# Patient Record
Sex: MALE | Race: WHITE | Employment: OTHER | URBAN - METROPOLITAN AREA
[De-identification: names, ages, dates, MRNs, and addresses within clinical notes are randomized per-mention and may not be internally consistent; named-entity substitution may affect disease eponyms.]

---

## 2017-01-16 ENCOUNTER — OFFICE VISIT (OUTPATIENT)
Dept: INTERNAL MEDICINE CLINIC | Facility: CLINIC | Age: 82
End: 2017-01-16

## 2017-01-16 VITALS
HEIGHT: 68 IN | SYSTOLIC BLOOD PRESSURE: 136 MMHG | HEART RATE: 64 BPM | RESPIRATION RATE: 16 BRPM | WEIGHT: 208 LBS | BODY MASS INDEX: 31.52 KG/M2 | DIASTOLIC BLOOD PRESSURE: 80 MMHG

## 2017-01-16 DIAGNOSIS — F17.200 TOBACCO USE DISORDER: ICD-10-CM

## 2017-01-16 DIAGNOSIS — I10 ESSENTIAL HYPERTENSION WITH GOAL BLOOD PRESSURE LESS THAN 130/85: ICD-10-CM

## 2017-01-16 DIAGNOSIS — Z23 NEED FOR VACCINATION: ICD-10-CM

## 2017-01-16 DIAGNOSIS — E78.2 MIXED HYPERLIPIDEMIA: ICD-10-CM

## 2017-01-16 DIAGNOSIS — E11.9 TYPE 2 DIABETES MELLITUS WITHOUT COMPLICATION, WITHOUT LONG-TERM CURRENT USE OF INSULIN (HCC): Primary | ICD-10-CM

## 2017-01-16 PROCEDURE — G0463 HOSPITAL OUTPT CLINIC VISIT: HCPCS | Performed by: INTERNAL MEDICINE

## 2017-01-16 PROCEDURE — 99214 OFFICE O/P EST MOD 30 MIN: CPT | Performed by: INTERNAL MEDICINE

## 2017-01-16 NOTE — PROGRESS NOTES
HPI:   1. Type 2 diabetes mellitus without complication, without long-term current use of insulin (HCC)    Ghada Mims is a [de-identified]year old male who presents for recheck of his diabetes. Patient’s FBS have been good.  Last visit with ophthalmologist was 2 05/08/2015 21   ----------  ALT (SGPT) (P) (U/L)   Date Value   05/13/2016 29   12/23/2015 19   05/08/2015 27   ----------   No results found for: [facility]      Current Outpatient Prescriptions:  simvastatin 20 MG Oral Tab Take 1 tablet (20 mg total) tenderness  EXTREMITIES: no cyanosis, clubbing or edema   NEURO: sensation is intact to monofilament     ASSESSMENT AND PLAN:   1.  Type 2 diabetes mellitus without complication, without long-term current use of insulin ([facility]    Lizeth Pereyra is a 70 ye

## 2017-02-13 RX ORDER — LISINOPRIL 10 MG/1
10 TABLET ORAL
Qty: 90 TABLET | Refills: 0 | Status: SHIPPED | OUTPATIENT
Start: 2017-02-13 | End: 2017-05-17

## 2017-02-13 RX ORDER — SIMVASTATIN 20 MG
20 TABLET ORAL NIGHTLY
Qty: 90 TABLET | Refills: 0 | Status: SHIPPED | OUTPATIENT
Start: 2017-02-13 | End: 2017-05-17

## 2017-02-13 NOTE — TELEPHONE ENCOUNTER
Cholesterol Medications - SIMVASTATIN  Protocol Criteria:  · Appointment scheduled in the past 12 months or in the next 3 months  · ALT & LDL on file in the past 12 months  · ALT result < 80  · LDL result <130   Recent Visits       Provider Department Prim

## 2017-02-13 NOTE — TELEPHONE ENCOUNTER
Hypertensive Medications - LISINOPRIL  Protocol Criteria:  · Appointment scheduled in the past 6 months or in the next 3 months  · BMP or CMP in the past 12 months  · Creatinine result < 2  Recent Visits       Provider Department Primary Dx    4 weeks ago

## 2017-02-13 NOTE — TELEPHONE ENCOUNTER
Diabetes Medications - METFORMIN  Protocol Criteria:  · Appointment scheduled in the past 6 months or the next 3 months  · A1C < 7.5 in the past 6 months  · Creatinine in the past 12 months  · Creatinine result < 1.5   Recent Visits       Provider Departme

## 2017-04-17 ENCOUNTER — OFFICE VISIT (OUTPATIENT)
Dept: INTERNAL MEDICINE CLINIC | Facility: CLINIC | Age: 82
End: 2017-04-17

## 2017-04-17 VITALS
BODY MASS INDEX: 31.07 KG/M2 | HEART RATE: 71 BPM | HEIGHT: 68 IN | SYSTOLIC BLOOD PRESSURE: 134 MMHG | DIASTOLIC BLOOD PRESSURE: 79 MMHG | WEIGHT: 205 LBS | RESPIRATION RATE: 16 BRPM

## 2017-04-17 DIAGNOSIS — I10 ESSENTIAL HYPERTENSION WITH GOAL BLOOD PRESSURE LESS THAN 130/85: ICD-10-CM

## 2017-04-17 DIAGNOSIS — E11.9 TYPE 2 DIABETES MELLITUS WITHOUT COMPLICATION, WITHOUT LONG-TERM CURRENT USE OF INSULIN (HCC): Primary | ICD-10-CM

## 2017-04-17 DIAGNOSIS — F17.200 TOBACCO USE DISORDER: ICD-10-CM

## 2017-04-17 DIAGNOSIS — E78.2 MIXED HYPERLIPIDEMIA: ICD-10-CM

## 2017-04-17 PROCEDURE — 99214 OFFICE O/P EST MOD 30 MIN: CPT | Performed by: INTERNAL MEDICINE

## 2017-04-17 PROCEDURE — G0463 HOSPITAL OUTPT CLINIC VISIT: HCPCS | Performed by: INTERNAL MEDICINE

## 2017-04-17 NOTE — PROGRESS NOTES
HPI:   1. Type 2 diabetes mellitus without complication, without long-term current use of insulin (HCC)    Kesha Steele is a [de-identified]year old male who presents for recheck of his diabetes. Patient’s FBS have been good.  Last visit with ophthalmologist was 2 05/08/2015 21   ----------  ALT (U/L)   Date Value   05/13/2016 29   12/23/2015 19   05/08/2015 27   ----------   No results found for: [facility]      Current Outpatient Prescriptions:  simvastatin 20 MG Oral Tab Take 1 tablet (20 mg total) by mouth n tenderness  EXTREMITIES: no cyanosis, clubbing or edema   NEURO: sensation is intact to monofilament   DIABETIC FOOT EXAM: Good pulse bilaterally Feet warm. Pin prick and light touch intact. No obvious deformities seen. ASSESSMENT AND PLAN:   1.  Type 2 issues and agrees to the plan.     The patient is asked to return in 3-4 months

## 2017-05-16 NOTE — TELEPHONE ENCOUNTER
Daughter states patient has been out of meds for 6 days. Would like for rx to be sent to [email or web address].

## 2017-05-17 ENCOUNTER — TELEPHONE (OUTPATIENT)
Dept: FAMILY MEDICINE CLINIC | Facility: CLINIC | Age: 82
End: 2017-05-17

## 2017-05-17 RX ORDER — LISINOPRIL 10 MG/1
10 TABLET ORAL
Qty: 30 TABLET | Refills: 0 | Status: SHIPPED | OUTPATIENT
Start: 2017-05-17 | End: 2017-06-19

## 2017-05-17 RX ORDER — LISINOPRIL 10 MG/1
10 TABLET ORAL
Qty: 90 TABLET | Refills: 0 | Status: CANCELLED | OUTPATIENT
Start: 2017-05-17

## 2017-05-17 RX ORDER — SIMVASTATIN 20 MG
20 TABLET ORAL NIGHTLY
Qty: 90 TABLET | Refills: 0 | Status: CANCELLED | OUTPATIENT
Start: 2017-05-17

## 2017-05-17 RX ORDER — SIMVASTATIN 20 MG
20 TABLET ORAL NIGHTLY
Qty: 30 TABLET | Refills: 0 | Status: SHIPPED | OUTPATIENT
Start: 2017-05-17 | End: 2017-06-19

## 2017-05-17 NOTE — TELEPHONE ENCOUNTER
Diabetes Medications  Protocol Criteria:  · Appointment scheduled in the past 6 months or the next 3 months  · A1C < 7.5 in the past 6 months  · Creatinine in the past 12 months  · Creatinine result < 1.5   Recent Visits       Provider Department Primary D Rubin F/U          Lab Results  Component Value Date   LDL 68 05/13/2016       Lab Results  Component Value Date   ALT 29 05/13/2016         Hypertensive Medications  Protocol Criteria:  · Appointment scheduled in the past 6 months or in the next 3 deng

## 2017-05-17 NOTE — TELEPHONE ENCOUNTER
To on call for Dr. Vicenta Chambers to send 30d supply for Pt? He has been out of meds for 6 days. Please advise. See refill encounter 5/12/17. Rx failed per protocol due to overdue labs. Pt's daughter informed to have labs drawn asap.

## 2017-05-17 NOTE — TELEPHONE ENCOUNTER
Pt's daughter called back. She was informed of Carmen's message below.  She verbalized understanding

## 2017-05-17 NOTE — TELEPHONE ENCOUNTER
LMTCB. Pt needs to have labs drawn. It has been over 1 year. Labs ordered in the system. Dr. DOLAN, please advise on request. Protocol failed due to overdue labs.  Thanks

## 2017-05-17 NOTE — TELEPHONE ENCOUNTER
Pt contacted (Name and  verified) and pt informed that his Rxs for Simvastatin, Metformin and Lisinopril were sent to the pharmacy on file and should be ready for  today or tomorrow.     Pt verbalizes understanding, expresses intent to comply and

## 2017-05-18 ENCOUNTER — LAB ENCOUNTER (OUTPATIENT)
Dept: LAB | Age: 82
End: 2017-05-18
Attending: INTERNAL MEDICINE
Payer: MEDICARE

## 2017-05-18 DIAGNOSIS — I10 ESSENTIAL HYPERTENSION WITH GOAL BLOOD PRESSURE LESS THAN 130/85: ICD-10-CM

## 2017-05-18 DIAGNOSIS — E78.2 MIXED HYPERLIPIDEMIA: ICD-10-CM

## 2017-05-18 DIAGNOSIS — E11.9 TYPE 2 DIABETES MELLITUS WITHOUT COMPLICATION, WITHOUT LONG-TERM CURRENT USE OF INSULIN (HCC): ICD-10-CM

## 2017-05-18 PROCEDURE — 81001 URINALYSIS AUTO W/SCOPE: CPT

## 2017-05-18 PROCEDURE — 36415 COLL VENOUS BLD VENIPUNCTURE: CPT

## 2017-05-18 PROCEDURE — 82570 ASSAY OF URINE CREATININE: CPT

## 2017-05-18 PROCEDURE — 84443 ASSAY THYROID STIM HORMONE: CPT

## 2017-05-18 PROCEDURE — 85025 COMPLETE CBC W/AUTO DIFF WBC: CPT

## 2017-05-18 PROCEDURE — 80053 COMPREHEN METABOLIC PANEL: CPT

## 2017-05-18 PROCEDURE — 82043 UR ALBUMIN QUANTITATIVE: CPT

## 2017-05-18 PROCEDURE — 83036 HEMOGLOBIN GLYCOSYLATED A1C: CPT

## 2017-05-18 PROCEDURE — 80061 LIPID PANEL: CPT

## 2017-05-30 ENCOUNTER — TELEPHONE (OUTPATIENT)
Dept: INTERNAL MEDICINE CLINIC | Facility: CLINIC | Age: 82
End: 2017-05-30

## 2017-05-30 NOTE — TELEPHONE ENCOUNTER
LMTCB. Please transfer to  [id number] anytime. Just want to verify with pt ok to dicuss medications with daughter as no HIPAA on file, and it appears last lab results and medication increase in metformin was given to pt (per 5/18 lab result note).

## 2017-05-30 NOTE — TELEPHONE ENCOUNTER
Patient daughter Emanuel Zuñiga called and stated has questions regarding medication father is on  Requesting a call back from nurse

## 2017-05-30 NOTE — TELEPHONE ENCOUNTER
Spoke to Tiara, with permission with patient. Explained why his metformin was increased; explained labs. She will make it appt for patient to fill out [employer].

## 2017-06-06 ENCOUNTER — TELEPHONE (OUTPATIENT)
Dept: INTERNAL MEDICINE CLINIC | Facility: CLINIC | Age: 82
End: 2017-06-06

## 2017-06-06 NOTE — TELEPHONE ENCOUNTER
Call transferred by CSS: Pt daughter calling back because thought RN she spoke with (see 5/25/17 encounter) told her she was going to send new metformin Rx (with increased dose) to [facility] but it appears it wasn't sent.  Informed daughter there was no need to s

## 2017-06-06 NOTE — TELEPHONE ENCOUNTER
Per daughter of pt,  RN suppose to send pt new rx med to his pharmacy on file but when pt went to pharmacy nothing was sent.

## 2017-06-20 RX ORDER — LISINOPRIL 10 MG/1
TABLET ORAL
Qty: 30 TABLET | Refills: 0 | Status: SHIPPED | OUTPATIENT
Start: 2017-06-20 | End: 2017-07-19

## 2017-06-20 RX ORDER — SIMVASTATIN 20 MG
TABLET ORAL
Qty: 30 TABLET | Refills: 0 | Status: SHIPPED | OUTPATIENT
Start: 2017-06-20 | End: 2017-07-19

## 2017-06-20 NOTE — TELEPHONE ENCOUNTER
Refilled per protocol    Cholesterol Medications  Protocol Criteria:  · Appointment scheduled in the past 12 months or in the next 3 months  · ALT & LDL on file in the past 12 months  · ALT result < 80  · LDL result <130     Future Appointments       Provi

## 2017-07-19 ENCOUNTER — OFFICE VISIT (OUTPATIENT)
Dept: INTERNAL MEDICINE CLINIC | Facility: CLINIC | Age: 82
End: 2017-07-19

## 2017-07-19 VITALS
HEART RATE: 68 BPM | WEIGHT: 204 LBS | BODY MASS INDEX: 30.92 KG/M2 | SYSTOLIC BLOOD PRESSURE: 122 MMHG | RESPIRATION RATE: 16 BRPM | DIASTOLIC BLOOD PRESSURE: 74 MMHG | HEIGHT: 68 IN

## 2017-07-19 DIAGNOSIS — E78.2 MIXED HYPERLIPIDEMIA: ICD-10-CM

## 2017-07-19 DIAGNOSIS — I10 ESSENTIAL HYPERTENSION WITH GOAL BLOOD PRESSURE LESS THAN 130/85: ICD-10-CM

## 2017-07-19 DIAGNOSIS — E11.9 TYPE 2 DIABETES MELLITUS WITHOUT COMPLICATION, WITHOUT LONG-TERM CURRENT USE OF INSULIN (HCC): Primary | ICD-10-CM

## 2017-07-19 DIAGNOSIS — F17.200 TOBACCO USE DISORDER: ICD-10-CM

## 2017-07-19 PROCEDURE — 99214 OFFICE O/P EST MOD 30 MIN: CPT | Performed by: INTERNAL MEDICINE

## 2017-07-19 PROCEDURE — G0463 HOSPITAL OUTPT CLINIC VISIT: HCPCS | Performed by: INTERNAL MEDICINE

## 2017-07-19 RX ORDER — LANCETS 28 GAUGE
EACH MISCELLANEOUS
Qty: 100 EACH | Refills: 5 | Status: SHIPPED | OUTPATIENT
Start: 2017-07-19 | End: 2018-11-26

## 2017-07-19 RX ORDER — LISINOPRIL 10 MG/1
10 TABLET ORAL DAILY
Qty: 90 TABLET | Refills: 3 | Status: SHIPPED | OUTPATIENT
Start: 2017-07-19 | End: 2017-10-16

## 2017-07-19 RX ORDER — SIMVASTATIN 20 MG
20 TABLET ORAL NIGHTLY
Qty: 90 TABLET | Refills: 3 | Status: SHIPPED | OUTPATIENT
Start: 2017-07-19 | End: 2017-10-16

## 2017-07-19 NOTE — PROGRESS NOTES
HPI:   1. Type 2 diabetes mellitus without complication, without long-term current use of insulin (HCC)    Arturo Ventura is a [de-identified]year old male who presents for recheck of his diabetes. Patient’s FBS have been good.  Last visit with ophthalmologist was 2 ----------  LDL Cholesterol (mg/dL)   Date Value   05/18/2017 76   05/13/2016 68   05/08/2015 84   05/12/2014 79   ----------  AST (U/L)   Date Value   05/18/2017 22   05/13/2016 21   12/23/2015 16   05/08/2015 21   ----------  ALT (U/L)   Date Value   0 heartburn  NEURO: denies headaches    EXAM:   /74   Pulse 68   Resp 16   Ht 5' 8\" (1.727 m)   Wt 204 lb (92.5 kg)   BMI 31.02 kg/m²   GENERAL: well developed, well nourished,in no apparent distress  SKIN: no rashes,no suspicious lesions  NECK: suppl chantix. Electronic cigarettes can help decrease nicotine intake and make quitting easier. Advised that I am able to help them through whatever means available to help them quit and they should contact me when they have made the decision to stop smoking.

## 2017-07-25 ENCOUNTER — TELEPHONE (OUTPATIENT)
Dept: INTERNAL MEDICINE CLINIC | Facility: CLINIC | Age: 82
End: 2017-07-25

## 2017-07-25 RX ORDER — AZITHROMYCIN 250 MG/1
TABLET, FILM COATED ORAL
Qty: 6 TABLET | Refills: 0 | Status: SHIPPED | OUTPATIENT
Start: 2017-07-25 | End: 2017-10-20 | Stop reason: ALTCHOICE

## 2017-07-25 NOTE — TELEPHONE ENCOUNTER
Spoke to Tiara, daughter; she called to f/u. RX was not sent. Chart reviewed. Will send Zpack to pharmacy. Informed daughter, Patient will need to sign PHI form. Will mail to home. Patient to sign at the next office visit.

## 2017-07-25 NOTE — TELEPHONE ENCOUNTER
Actions Requested: advise on rx or OTc for sore throat as pt was just here 7/19. Pt states that he told doctor about his sore throat at the [address] [place] [address] Avenue.   Problem: sore throat (daughter calling for the pt)  Onset and Timing: one week   Associated Symptoms: sore thr Discussed:  * For Relief of Sore Throat Pain  * Drink Plenty Liquids

## 2017-10-06 ENCOUNTER — PATIENT OUTREACH (OUTPATIENT)
Dept: INTERNAL MEDICINE CLINIC | Facility: CLINIC | Age: 82
End: 2017-10-06

## 2017-10-16 ENCOUNTER — APPOINTMENT (OUTPATIENT)
Dept: LAB | Facility: HOSPITAL | Age: 82
End: 2017-10-16
Attending: INTERNAL MEDICINE
Payer: MEDICARE

## 2017-10-16 ENCOUNTER — OFFICE VISIT (OUTPATIENT)
Dept: INTERNAL MEDICINE CLINIC | Facility: CLINIC | Age: 82
End: 2017-10-16

## 2017-10-16 VITALS
HEART RATE: 65 BPM | WEIGHT: 203 LBS | BODY MASS INDEX: 30.77 KG/M2 | SYSTOLIC BLOOD PRESSURE: 133 MMHG | RESPIRATION RATE: 16 BRPM | DIASTOLIC BLOOD PRESSURE: 76 MMHG | HEIGHT: 68 IN

## 2017-10-16 DIAGNOSIS — E11.9 TYPE 2 DIABETES MELLITUS WITHOUT COMPLICATION, WITHOUT LONG-TERM CURRENT USE OF INSULIN (HCC): Primary | ICD-10-CM

## 2017-10-16 DIAGNOSIS — Z23 NEED FOR VACCINATION: ICD-10-CM

## 2017-10-16 DIAGNOSIS — E78.2 MIXED HYPERLIPIDEMIA: ICD-10-CM

## 2017-10-16 DIAGNOSIS — I10 ESSENTIAL HYPERTENSION WITH GOAL BLOOD PRESSURE LESS THAN 130/85: ICD-10-CM

## 2017-10-16 DIAGNOSIS — E11.9 TYPE 2 DIABETES MELLITUS WITHOUT COMPLICATION, WITHOUT LONG-TERM CURRENT USE OF INSULIN (HCC): ICD-10-CM

## 2017-10-16 DIAGNOSIS — F17.200 TOBACCO USE DISORDER: ICD-10-CM

## 2017-10-16 PROCEDURE — G0009 ADMIN PNEUMOCOCCAL VACCINE: HCPCS | Performed by: INTERNAL MEDICINE

## 2017-10-16 PROCEDURE — 99214 OFFICE O/P EST MOD 30 MIN: CPT | Performed by: INTERNAL MEDICINE

## 2017-10-16 PROCEDURE — 83036 HEMOGLOBIN GLYCOSYLATED A1C: CPT

## 2017-10-16 PROCEDURE — 90670 PCV13 VACCINE IM: CPT | Performed by: INTERNAL MEDICINE

## 2017-10-16 PROCEDURE — 36415 COLL VENOUS BLD VENIPUNCTURE: CPT

## 2017-10-16 PROCEDURE — G0463 HOSPITAL OUTPT CLINIC VISIT: HCPCS | Performed by: INTERNAL MEDICINE

## 2017-10-16 RX ORDER — SIMVASTATIN 20 MG
20 TABLET ORAL NIGHTLY
Qty: 90 TABLET | Refills: 3 | Status: SHIPPED | OUTPATIENT
Start: 2017-10-16 | End: 2018-09-17

## 2017-10-16 RX ORDER — LISINOPRIL 10 MG/1
10 TABLET ORAL DAILY
Qty: 90 TABLET | Refills: 3 | Status: SHIPPED | OUTPATIENT
Start: 2017-10-16 | End: 2018-09-17

## 2017-10-16 NOTE — PROGRESS NOTES
HPI:   1. Type 2 diabetes mellitus without complication, without long-term current use of insulin (HCC)    Marium Navas is a [de-identified]year old male who presents for recheck of his diabetes. Patient’s FBS have been good.  Last visit with ophthalmologist was 2 ----------  LDL Cholesterol (mg/dL)   Date Value   05/18/2017 76   05/13/2016 68   05/08/2015 84   05/12/2014 79   ----------  AST (U/L)   Date Value   05/18/2017 22   05/13/2016 21   12/23/2015 16   05/08/2015 21   ----------  ALT (U/L)   Date Value   0 shortness of breath with exertion  CARDIOVASCULAR: denies chest pain on exertion  GI: denies abdominal pain and denies heartburn  NEURO: denies headaches    EXAM:   /76 (BP Location: Right arm, Patient Position: Sitting, Cuff Size: adult)   Pulse 65 ideal body weight. 4. Tobacco use disorder    Patient strongly encouraged to TOTALLY stop smoking. Warned that smoking increases ALL cancers and shortens lifespan.  Told patient that various medicines are available that can be prescribed to help them sto

## 2017-10-17 ENCOUNTER — PATIENT OUTREACH (OUTPATIENT)
Dept: INTERNAL MEDICINE CLINIC | Facility: CLINIC | Age: 82
End: 2017-10-17

## 2017-10-17 NOTE — PROGRESS NOTES
Outreached to patient in regards to enrollment to Chronic Care Management program. Left message for patient to return my call at ext. [phone]. Thank you.

## 2017-10-19 ENCOUNTER — NURSE TRIAGE (OUTPATIENT)
Dept: OTHER | Age: 82
End: 2017-10-19

## 2017-10-19 NOTE — TELEPHONE ENCOUNTER
Action Requested: Summary for Provider     []  Critical Lab, Recommendations Needed  [x] Need Additional Advice  [x]   FYI    []   Need Orders  [] Need Medications Sent to Pharmacy  [x]  Other     SUMMARY: R shoulder pain    Patient's daughter called (HIP[employer]

## 2017-10-20 ENCOUNTER — OFFICE VISIT (OUTPATIENT)
Dept: FAMILY MEDICINE CLINIC | Facility: CLINIC | Age: 82
End: 2017-10-20

## 2017-10-20 VITALS
BODY MASS INDEX: 31.22 KG/M2 | WEIGHT: 206 LBS | DIASTOLIC BLOOD PRESSURE: 72 MMHG | HEART RATE: 73 BPM | SYSTOLIC BLOOD PRESSURE: 130 MMHG | TEMPERATURE: 98 F | HEIGHT: 68 IN

## 2017-10-20 DIAGNOSIS — Z88.7 ALLERGY TO PNEUMONIA VACCINE: Primary | ICD-10-CM

## 2017-10-20 PROCEDURE — 99214 OFFICE O/P EST MOD 30 MIN: CPT | Performed by: NURSE PRACTITIONER

## 2017-10-20 RX ORDER — NAPROXEN 500 MG/1
500 TABLET ORAL 2 TIMES DAILY WITH MEALS
Qty: 10 TABLET | Refills: 0 | Status: SHIPPED | OUTPATIENT
Start: 2017-10-20 | End: 2019-09-05 | Stop reason: ALTCHOICE

## 2017-10-20 NOTE — PROGRESS NOTES
HPI  Pt present with right upper arm pain and difficulty moving arm. States that he got the pneumonia shot on Monday and by Tuesday felt very ill, whole body was weak and pt found it hard to move.   Denies chest pain,fever or shortness of breath at the sami Smoking status: Current Every Day Smoker  0.25 Packs/day  For 50.00 Years     Smokeless tobacco: Current User    Alcohol use Yes  1.5 oz/week    3 Glasses of wine per week         Drug use: No    Sexual activity: Not on file     Other Topics Concern    Ca Skin: Skin is warm and dry. No rash noted. No erythema. Psychiatric: He has a normal mood and affect. His behavior is normal. Judgment and thought content normal.   Nursing note and vitals reviewed. Assessment:     1.  Allergy to pneumonia vaccine

## 2017-10-20 NOTE — TELEPHONE ENCOUNTER
Call patient again and see how arm is. If uncomfortable place ice for short intervals to area. If severe pain go to immed care.

## 2018-01-15 ENCOUNTER — OFFICE VISIT (OUTPATIENT)
Dept: INTERNAL MEDICINE CLINIC | Facility: CLINIC | Age: 83
End: 2018-01-15

## 2018-01-15 VITALS
RESPIRATION RATE: 16 BRPM | HEART RATE: 64 BPM | BODY MASS INDEX: 31.07 KG/M2 | HEIGHT: 68 IN | DIASTOLIC BLOOD PRESSURE: 80 MMHG | SYSTOLIC BLOOD PRESSURE: 138 MMHG | WEIGHT: 205 LBS

## 2018-01-15 DIAGNOSIS — E78.2 MIXED HYPERLIPIDEMIA: ICD-10-CM

## 2018-01-15 DIAGNOSIS — E11.9 TYPE 2 DIABETES MELLITUS WITHOUT COMPLICATION, WITHOUT LONG-TERM CURRENT USE OF INSULIN (HCC): Primary | ICD-10-CM

## 2018-01-15 DIAGNOSIS — F17.200 TOBACCO USE DISORDER: ICD-10-CM

## 2018-01-15 DIAGNOSIS — I10 ESSENTIAL HYPERTENSION WITH GOAL BLOOD PRESSURE LESS THAN 130/85: ICD-10-CM

## 2018-01-15 PROCEDURE — G0463 HOSPITAL OUTPT CLINIC VISIT: HCPCS | Performed by: INTERNAL MEDICINE

## 2018-01-15 PROCEDURE — 99214 OFFICE O/P EST MOD 30 MIN: CPT | Performed by: INTERNAL MEDICINE

## 2018-01-15 NOTE — PROGRESS NOTES
HPI:   1. Type 2 diabetes mellitus without complication, without long-term current use of insulin (HCC)    Zoey Reich is a 82year old male who presents for recheck of his diabetes. Patient’s FBS have been good.  Last visit with ophthalmologist was 2 47   05/12/2014 41   ----------  LDL Cholesterol (mg/dL)   Date Value   05/18/2017 76   05/13/2016 68   05/08/2015 84   05/12/2014 79   ----------  AST (U/L)   Date Value   05/18/2017 22   05/13/2016 21   12/23/2015 16   05/08/2015 21   ----------  ALT (U/ denies any unusual skin lesions or rashes  RESPIRATORY: denies shortness of breath with exertion  CARDIOVASCULAR: denies chest pain on exertion  GI: denies abdominal pain and denies heartburn  NEURO: denies headaches    EXAM:   /80 (BP Location: Righ Hyperlipidemia    Patient instructed to take cholesterol lowering medications as prescribed and to continue to follow a low fat, low cholesterol diet as discussed.  Try to exercise at least 3 times weekly to build strength, burn calories and help to achieve

## 2018-03-30 ENCOUNTER — LAB ENCOUNTER (OUTPATIENT)
Dept: LAB | Age: 83
End: 2018-03-30
Attending: INTERNAL MEDICINE
Payer: MEDICARE

## 2018-03-30 DIAGNOSIS — E11.9 TYPE 2 DIABETES MELLITUS WITHOUT COMPLICATION, WITHOUT LONG-TERM CURRENT USE OF INSULIN (HCC): ICD-10-CM

## 2018-03-30 DIAGNOSIS — E78.2 MIXED HYPERLIPIDEMIA: ICD-10-CM

## 2018-03-30 DIAGNOSIS — I10 ESSENTIAL HYPERTENSION WITH GOAL BLOOD PRESSURE LESS THAN 130/85: ICD-10-CM

## 2018-03-30 PROCEDURE — 84443 ASSAY THYROID STIM HORMONE: CPT

## 2018-03-30 PROCEDURE — 80061 LIPID PANEL: CPT

## 2018-03-30 PROCEDURE — 82570 ASSAY OF URINE CREATININE: CPT

## 2018-03-30 PROCEDURE — 82043 UR ALBUMIN QUANTITATIVE: CPT

## 2018-03-30 PROCEDURE — 83036 HEMOGLOBIN GLYCOSYLATED A1C: CPT

## 2018-03-30 PROCEDURE — 80053 COMPREHEN METABOLIC PANEL: CPT

## 2018-03-30 PROCEDURE — 36415 COLL VENOUS BLD VENIPUNCTURE: CPT

## 2018-03-30 PROCEDURE — 85025 COMPLETE CBC W/AUTO DIFF WBC: CPT

## 2018-03-30 PROCEDURE — 81001 URINALYSIS AUTO W/SCOPE: CPT

## 2018-04-04 ENCOUNTER — HOSPITAL ENCOUNTER (OUTPATIENT)
Age: 83
Discharge: HOME | End: 2018-04-04
Attending: FAMILY MEDICINE
Payer: MEDICARE

## 2018-04-04 VITALS
WEIGHT: 202 LBS | HEART RATE: 61 BPM | BODY MASS INDEX: 31 KG/M2 | DIASTOLIC BLOOD PRESSURE: 76 MMHG | SYSTOLIC BLOOD PRESSURE: 166 MMHG | TEMPERATURE: 97 F | OXYGEN SATURATION: 99 % | RESPIRATION RATE: 18 BRPM

## 2018-04-04 DIAGNOSIS — S61.411A LACERATION OF RIGHT HAND WITHOUT FOREIGN BODY, INITIAL ENCOUNTER: Primary | ICD-10-CM

## 2018-04-04 PROCEDURE — 99204 OFFICE O/P NEW MOD 45 MIN: CPT

## 2018-04-04 PROCEDURE — 90471 IMMUNIZATION ADMIN: CPT

## 2018-04-04 PROCEDURE — 99213 OFFICE O/P EST LOW 20 MIN: CPT

## 2018-04-04 RX ORDER — CEPHALEXIN 500 MG/1
500 CAPSULE ORAL 4 TIMES DAILY
Qty: 28 CAPSULE | Refills: 0 | Status: SHIPPED | OUTPATIENT
Start: 2018-04-04 | End: 2018-04-11

## 2018-04-04 NOTE — ED NOTES
Boostrix given right delt/ wound washed with soap and water shur cleans solution. Bacitracin oint and Kerlix applied secured with tape wound care review dressing change inst daily and as needed. Follow up with pcp in 2 days in office.  Go to the ed for inc

## 2018-04-04 NOTE — ED INITIAL ASSESSMENT (HPI)
Yesterday was pulling up branches and sustained 1/2 in approx lac to dorsal rt hand this am swollen and red painful.  Last tetanus unknowen

## 2018-04-04 NOTE — ED PROVIDER NOTES
Patient Seen in: [facility] Immediate Care In [address] [place] [address]    History   Patient presents with:  Upper Extremity Injury (musculoskeletal)    Stated Complaint: rt hand swelling    HPI    Patient is here with right hand laceration.   Laceration suffered f time. He appears well-developed and well-nourished. No distress. HENT:   Head: Normocephalic and atraumatic. Cardiovascular: Normal rate and regular rhythm.     Pulmonary/Chest: Effort normal and breath sounds normal.   Musculoskeletal: Normal range of

## 2018-04-05 ENCOUNTER — TELEPHONE (OUTPATIENT)
Dept: INTERNAL MEDICINE CLINIC | Facility: CLINIC | Age: 83
End: 2018-04-05

## 2018-04-05 ENCOUNTER — OFFICE VISIT (OUTPATIENT)
Dept: INTERNAL MEDICINE CLINIC | Facility: CLINIC | Age: 83
End: 2018-04-05

## 2018-04-05 VITALS
HEART RATE: 61 BPM | BODY MASS INDEX: 31.52 KG/M2 | HEIGHT: 68 IN | DIASTOLIC BLOOD PRESSURE: 70 MMHG | WEIGHT: 208 LBS | RESPIRATION RATE: 16 BRPM | SYSTOLIC BLOOD PRESSURE: 134 MMHG

## 2018-04-05 DIAGNOSIS — E11.9 TYPE 2 DIABETES MELLITUS WITHOUT COMPLICATION, WITHOUT LONG-TERM CURRENT USE OF INSULIN (HCC): ICD-10-CM

## 2018-04-05 DIAGNOSIS — E78.2 MIXED HYPERLIPIDEMIA: ICD-10-CM

## 2018-04-05 DIAGNOSIS — F17.200 TOBACCO USE DISORDER: ICD-10-CM

## 2018-04-05 DIAGNOSIS — I10 ESSENTIAL HYPERTENSION WITH GOAL BLOOD PRESSURE LESS THAN 130/85: ICD-10-CM

## 2018-04-05 DIAGNOSIS — S61.411D LACERATION OF RIGHT HAND WITHOUT FOREIGN BODY, SUBSEQUENT ENCOUNTER: Primary | ICD-10-CM

## 2018-04-05 PROCEDURE — 99214 OFFICE O/P EST MOD 30 MIN: CPT | Performed by: INTERNAL MEDICINE

## 2018-04-05 PROCEDURE — G0463 HOSPITAL OUTPT CLINIC VISIT: HCPCS | Performed by: INTERNAL MEDICINE

## 2018-04-05 RX ORDER — GLIMEPIRIDE 2 MG/1
2 TABLET ORAL
Qty: 90 TABLET | Refills: 3 | Status: SHIPPED | OUTPATIENT
Start: 2018-04-05 | End: 2019-03-13

## 2018-04-05 NOTE — TELEPHONE ENCOUNTER
LMTCB transfer to triage-- follow-up to [address] [place] [address] Rd 4/5/18, laceration, blood sugar at home      Notes recorded by Shannon Quick MD on 4/5/2018 at 2:31 PM CDT  Reviewed in office. Diabetes too high.  Start glimepiride 2 mg daily in AM and monitor BS closely a

## 2018-04-05 NOTE — PROGRESS NOTES
HPI:   1. Laceration of right hand without foreign body, subsequent encounter    Cut hand working outside and went to ER. Went to [facility] ER and had antibiotics done. Has less swelling and pain noted in the right hand area.      2. Type 2 diabetes mellitu Value   03/30/2018 152   05/18/2017 153   05/13/2016 175   05/08/2015 164   05/12/2014 167   ----------  HDL Cholesterol (mg/dL)   Date Value   03/30/2018 35   05/18/2017 35   ----------  HDL CHOLESTEROL (P) (mg/dL)   Date Value   05/13/2016 35   05/08/201 Past Surgical History:  10/29/13: ELECTROCARDIOGRAM, COMPLETE      Comment: scanned into media tab  No date: OTHER SURGICAL HISTORY Right   Social History: Smoking status: Current Every Day Smoker                                                   Packs/d year, check labs, lose wgt with carbohydrate controlled diet and exercise,  check feet daily. Check blood work. Check HgA1c. If over 7 much will add glimepiride 2 mg to treatment since fastings running in 140s - 150s or so.     3. Essential hypertension wi

## 2018-04-05 NOTE — TELEPHONE ENCOUNTER
Spoke to daughter Ken Tiwari who is on HIPPA informed of below with understanding. She will call us back with the blood sugar reading. mychart code given to the daughter.

## 2018-07-31 RX ORDER — BLOOD-GLUCOSE METER
KIT MISCELLANEOUS
Qty: 100 STRIP | Refills: 1 | Status: SHIPPED | OUTPATIENT
Start: 2018-07-31 | End: 2018-08-06

## 2018-08-06 ENCOUNTER — TELEPHONE (OUTPATIENT)
Dept: INTERNAL MEDICINE CLINIC | Facility: CLINIC | Age: 83
End: 2018-08-06

## 2018-08-06 RX ORDER — BLOOD-GLUCOSE METER
KIT MISCELLANEOUS
Qty: 100 STRIP | Refills: 1 | Status: SHIPPED | OUTPATIENT
Start: 2018-08-06 | End: 2018-11-26

## 2018-08-06 NOTE — TELEPHONE ENCOUNTER
ICD 10 code is missing from script. Please advise.      FREESTYLE LITE TEST In Vitro Strip 100 strip 1 7/31/2018    Sig :  TEST DAILY     Route:   (none)     Order #:   [id number]

## 2018-08-20 ENCOUNTER — OFFICE VISIT (OUTPATIENT)
Dept: INTERNAL MEDICINE CLINIC | Facility: CLINIC | Age: 83
End: 2018-08-20
Payer: MEDICARE

## 2018-08-20 VITALS
BODY MASS INDEX: 30.31 KG/M2 | WEIGHT: 200 LBS | HEIGHT: 68 IN | SYSTOLIC BLOOD PRESSURE: 117 MMHG | RESPIRATION RATE: 16 BRPM | HEART RATE: 65 BPM | DIASTOLIC BLOOD PRESSURE: 69 MMHG

## 2018-08-20 DIAGNOSIS — E11.9 TYPE 2 DIABETES MELLITUS WITHOUT COMPLICATION, WITHOUT LONG-TERM CURRENT USE OF INSULIN (HCC): Primary | ICD-10-CM

## 2018-08-20 DIAGNOSIS — I10 ESSENTIAL HYPERTENSION WITH GOAL BLOOD PRESSURE LESS THAN 130/85: ICD-10-CM

## 2018-08-20 DIAGNOSIS — F17.200 TOBACCO USE DISORDER: ICD-10-CM

## 2018-08-20 DIAGNOSIS — E78.2 MIXED HYPERLIPIDEMIA: ICD-10-CM

## 2018-08-20 PROCEDURE — 99214 OFFICE O/P EST MOD 30 MIN: CPT | Performed by: INTERNAL MEDICINE

## 2018-08-20 PROCEDURE — G0463 HOSPITAL OUTPT CLINIC VISIT: HCPCS | Performed by: INTERNAL MEDICINE

## 2018-08-20 NOTE — PROGRESS NOTES
HPI:   1. Type 2 diabetes mellitus without complication, without long-term current use of insulin (HCC)    Lizeth Pereyra is a 82year old male who presents for recheck of his diabetes. Patient’s FBS have been good.  Last visit with ophthalmologist was 2 Value   05/13/2016 35   05/08/2015 47   05/12/2014 41   ----------  LDL Cholesterol (mg/dL)   Date Value   03/30/2018 75   05/18/2017 76   05/13/2016 68   05/08/2015 84   05/12/2014 79   ----------  AST (U/L)   Date Value   03/30/2018 18   05/18/2017 22 Packs/day: 0.25      Years: 50.00     Smokeless tobacco: Never Used                      Alcohol use: Yes           1.5 oz/week     Glasses of wine: 3 per week    Exercise: once per week.   Diet: watches fats closely     REVIEW OF SYSTEMS:   GENE weight and lead to better blood pressure control. 3. Mixed Hyperlipidemia    Patient instructed to take cholesterol lowering medications as prescribed and to continue to follow a low fat, low cholesterol diet as discussed.  Try to exercise at least 3 sami

## 2018-09-07 ENCOUNTER — APPOINTMENT (OUTPATIENT)
Dept: ULTRASOUND IMAGING | Facility: HOSPITAL | Age: 83
End: 2018-09-07
Attending: EMERGENCY MEDICINE
Payer: MEDICARE

## 2018-09-07 ENCOUNTER — NURSE TRIAGE (OUTPATIENT)
Dept: OTHER | Age: 83
End: 2018-09-07

## 2018-09-07 ENCOUNTER — HOSPITAL ENCOUNTER (OUTPATIENT)
Age: 83
Discharge: HOME | End: 2018-09-07
Attending: EMERGENCY MEDICINE
Payer: MEDICARE

## 2018-09-07 VITALS
TEMPERATURE: 98 F | HEIGHT: 68 IN | BODY MASS INDEX: 29.55 KG/M2 | HEART RATE: 65 BPM | WEIGHT: 195 LBS | SYSTOLIC BLOOD PRESSURE: 148 MMHG | RESPIRATION RATE: 18 BRPM | DIASTOLIC BLOOD PRESSURE: 68 MMHG | OXYGEN SATURATION: 99 %

## 2018-09-07 DIAGNOSIS — M54.40 BACK PAIN OF LUMBAR REGION WITH SCIATICA: Primary | ICD-10-CM

## 2018-09-07 PROCEDURE — 99214 OFFICE O/P EST MOD 30 MIN: CPT

## 2018-09-07 PROCEDURE — 93971 EXTREMITY STUDY: CPT | Performed by: EMERGENCY MEDICINE

## 2018-09-07 NOTE — TELEPHONE ENCOUNTER
Daughter Nirav Rosales (on HIPAA) called about father, he has chronic leg pain, she didn't know which leg as she is not with patient, she is at work.  Today he woke up, got out of bed and the lower leg pain, one leg only, that usually does go away, did not go away,

## 2018-09-07 NOTE — ED PROVIDER NOTES
Patient Seen in: [facility] Immediate Care In [address] [place] [address]    History   Patient presents with:  Lower Extremity Injury (musculoskeletal)    Stated Complaint: leg pain    HPI    Patient is an 59-year-old male presents with right lateral leg pain starte alert  HEENT: MMM, EOMI, PERRL  Neck: supple, non tender  CV: RRR, no murmurs, distal pulses 2+  Resp: CTAB, no wheezes or retractions  Extremities: FROM of all extremities, no cyanosis/clubbing/edema. Homans sign negative.  No palpable cords  Back: nontend

## 2018-09-08 NOTE — TELEPHONE ENCOUNTER
Called for f/u. IC visit is noted in EMR.   CSS, please call and assist pt in scheduling f/u appt. Thank you.

## 2018-09-08 NOTE — TELEPHONE ENCOUNTER
Cait Muniz returned call regarding patient, reports he did go to [address] [place] [address] yesterday. Advised will see how his pain is doing by Monday, if still having pain will need an appt with Dr DOLAN for further treatment, possibly needs injections for his pain.  No other concerns a

## 2018-09-12 ENCOUNTER — TELEPHONE (OUTPATIENT)
Dept: OTHER | Age: 83
End: 2018-09-12

## 2018-09-12 ENCOUNTER — TELEPHONE (OUTPATIENT)
Dept: INTERNAL MEDICINE CLINIC | Facility: CLINIC | Age: 83
End: 2018-09-12

## 2018-09-12 RX ORDER — TRAMADOL HYDROCHLORIDE 50 MG/1
50 TABLET ORAL EVERY 6 HOURS PRN
Qty: 30 TABLET | Refills: 1 | OUTPATIENT
Start: 2018-09-12 | End: 2019-11-21

## 2018-09-12 NOTE — TELEPHONE ENCOUNTER
Attempted to reach daughter phone was answered by patient and was informed of new rx sent to pharmacy, verbalized understanding.

## 2018-09-12 NOTE — TELEPHONE ENCOUNTER
Pt daughter calling and states pt was in UC for back pain 9/7/18. Pt was instructed to take ibuprofen and per daughter it is not helping with pain. Offered f/u visit with Dr Merline Rather and pt daughter agrees to schedule. OV scheduled 9/17/18.     Pt naeem

## 2018-09-14 NOTE — TELEPHONE ENCOUNTER
Advised patient's daughter on Dr. Bridget Hart's information and recommendations. Daughter verbalized understanding. Advised to have patient take with food; daughter stated that patient does take with some food.  Already scheduled to see Dr Benji Thurman on [facility]

## 2018-09-14 NOTE — TELEPHONE ENCOUNTER
Daughter Gigi Thayer states pt is having side effects from the tramadol prescribed 9/12/18: nausea, dizziness, and reports \"feels loopy. \" Pt was conferenced in to call to provide more details (as daughter unable to answer questions): Pt states originally vo

## 2018-09-17 ENCOUNTER — OFFICE VISIT (OUTPATIENT)
Dept: INTERNAL MEDICINE CLINIC | Facility: CLINIC | Age: 83
End: 2018-09-17
Payer: MEDICARE

## 2018-09-17 VITALS
DIASTOLIC BLOOD PRESSURE: 71 MMHG | WEIGHT: 199 LBS | RESPIRATION RATE: 16 BRPM | SYSTOLIC BLOOD PRESSURE: 136 MMHG | BODY MASS INDEX: 30.16 KG/M2 | HEART RATE: 60 BPM | HEIGHT: 68 IN

## 2018-09-17 DIAGNOSIS — S74.01XS: Primary | ICD-10-CM

## 2018-09-17 DIAGNOSIS — I10 ESSENTIAL HYPERTENSION WITH GOAL BLOOD PRESSURE LESS THAN 130/85: ICD-10-CM

## 2018-09-17 DIAGNOSIS — E11.9 TYPE 2 DIABETES MELLITUS WITHOUT COMPLICATION, WITHOUT LONG-TERM CURRENT USE OF INSULIN (HCC): ICD-10-CM

## 2018-09-17 PROCEDURE — G0463 HOSPITAL OUTPT CLINIC VISIT: HCPCS | Performed by: INTERNAL MEDICINE

## 2018-09-17 PROCEDURE — 99214 OFFICE O/P EST MOD 30 MIN: CPT | Performed by: INTERNAL MEDICINE

## 2018-09-17 RX ORDER — SIMVASTATIN 20 MG
20 TABLET ORAL NIGHTLY
Qty: 90 TABLET | Refills: 0 | Status: SHIPPED | OUTPATIENT
Start: 2018-09-17 | End: 2018-12-17

## 2018-09-17 RX ORDER — LISINOPRIL 10 MG/1
10 TABLET ORAL DAILY
Qty: 90 TABLET | Refills: 3 | Status: SHIPPED | OUTPATIENT
Start: 2018-09-17 | End: 2019-09-11

## 2018-09-17 NOTE — PROGRESS NOTES
HPI:   1. Right Leg pain    Has been getting an achy 7/10 pain in right leg over the last 14 days. Took excedrin and has helped the pain somewhat. No pain in bed. No pain in his back. No weakness. Has not had back pain previously.     2 Type 2 diabetes sandra LDL Cholesterol (mg/dL)   Date Value   03/30/2018 75   05/18/2017 76   05/13/2016 68   05/08/2015 84   05/12/2014 79     AST (U/L)   Date Value   03/30/2018 18   05/18/2017 22   05/13/2016 21   12/23/2015 16   05/08/2015 21     ALT (U/L)   Date Value status: Current Every Day Smoker        Packs/day: 0.25        Years: 50.00        Pack years: 12.5      Smokeless tobacco: Never Used    Alcohol use:  Yes      Alcohol/week: 1.5 oz      Types: 3 Glasses of wine per week    Drug use: No    Exercise: once pe gets worse. 2. Type 2 diabetes mellitus without complication, without long-term current use of insulin (HCC)    Bud Roberto is a 70year old male who presents for a recheck of his diabetes. Diabetic control is good. HgA1c 6.6 in last 3 months.  Saw

## 2018-09-17 NOTE — TELEPHONE ENCOUNTER
Refill request routed to MD for review. Per OV pt started on new med and has not rechecked labs. Notes recorded by Lewis Benitez MD on 4/5/2018 at 2:31 PM CDT  Reviewed in office. Diabetes too high.  Start glimepiride 2 mg d

## 2018-09-17 NOTE — TELEPHONE ENCOUNTER
Diabetes Medications  Protocol Criteria:  · Appointment scheduled in the past 6 months or the next 3 months  · A1C < 7.5 in the past 6 months  · Creatinine in the past 12 months  · Creatinine result < 1.5   Recent Outpatient Visits            4 weeks ago T

## 2018-09-17 NOTE — TELEPHONE ENCOUNTER
Cholesterol Medications  Protocol Criteria:  · Appointment scheduled in the past 12 months or in the next 3 months  · ALT & LDL on file in the past 12 months  · ALT result < 80  · LDL result <130   Recent Outpatient Visits            4 weeks ago Type 2 kalyn

## 2018-09-20 ENCOUNTER — APPOINTMENT (OUTPATIENT)
Dept: LAB | Age: 83
End: 2018-09-20
Attending: INTERNAL MEDICINE
Payer: MEDICARE

## 2018-09-20 DIAGNOSIS — E11.9 TYPE 2 DIABETES MELLITUS WITHOUT COMPLICATION, WITHOUT LONG-TERM CURRENT USE OF INSULIN (HCC): ICD-10-CM

## 2018-09-20 LAB — HBA1C MFR BLD: 6.1 % (ref 4–6)

## 2018-09-20 PROCEDURE — 36415 COLL VENOUS BLD VENIPUNCTURE: CPT

## 2018-09-20 PROCEDURE — 83036 HEMOGLOBIN GLYCOSYLATED A1C: CPT

## 2018-11-26 ENCOUNTER — OFFICE VISIT (OUTPATIENT)
Dept: INTERNAL MEDICINE CLINIC | Facility: CLINIC | Age: 83
End: 2018-11-26
Payer: MEDICARE

## 2018-11-26 VITALS
WEIGHT: 202 LBS | SYSTOLIC BLOOD PRESSURE: 135 MMHG | BODY MASS INDEX: 30.62 KG/M2 | HEART RATE: 76 BPM | RESPIRATION RATE: 16 BRPM | HEIGHT: 68 IN | DIASTOLIC BLOOD PRESSURE: 79 MMHG

## 2018-11-26 DIAGNOSIS — I10 ESSENTIAL HYPERTENSION WITH GOAL BLOOD PRESSURE LESS THAN 130/85: ICD-10-CM

## 2018-11-26 DIAGNOSIS — F17.200 TOBACCO USE DISORDER: ICD-10-CM

## 2018-11-26 DIAGNOSIS — E78.2 MIXED HYPERLIPIDEMIA: ICD-10-CM

## 2018-11-26 DIAGNOSIS — E11.9 TYPE 2 DIABETES MELLITUS WITHOUT COMPLICATION, WITHOUT LONG-TERM CURRENT USE OF INSULIN (HCC): Primary | ICD-10-CM

## 2018-11-26 PROCEDURE — G0463 HOSPITAL OUTPT CLINIC VISIT: HCPCS | Performed by: INTERNAL MEDICINE

## 2018-11-26 PROCEDURE — 99214 OFFICE O/P EST MOD 30 MIN: CPT | Performed by: INTERNAL MEDICINE

## 2018-11-26 RX ORDER — LANCETS 28 GAUGE
EACH MISCELLANEOUS
Qty: 100 EACH | Refills: 5 | Status: SHIPPED | OUTPATIENT
Start: 2018-11-26 | End: 2019-12-27

## 2018-11-26 RX ORDER — BLOOD-GLUCOSE METER
KIT MISCELLANEOUS
Qty: 100 STRIP | Refills: 1 | Status: SHIPPED | OUTPATIENT
Start: 2018-11-26 | End: 2019-12-27

## 2018-11-26 NOTE — PROGRESS NOTES
HPI:   1. Type 2 diabetes mellitus without complication, without long-term current use of insulin (HCC)    Marcella Li is a 82year old male who presents for recheck of his diabetes. Patient’s FBS have been good.  Last visit with ophthalmologist was 2 05/12/2014 41     LDL Cholesterol (mg/dL)   Date Value   03/30/2018 75   05/18/2017 76   05/13/2016 68   05/08/2015 84   05/12/2014 79     AST (U/L)   Date Value   03/30/2018 18   05/18/2017 22   05/13/2016 21   12/23/2015 16   05/08/2015 21     ALT (U/L History    Tobacco Use      Smoking status: Current Every Day Smoker        Packs/day: 0.25        Years: 50.00        Pack years: 12.5      Smokeless tobacco: Never Used    Alcohol use:  Yes      Alcohol/week: 1.5 oz      Types: 3 Glasses of wine per week as discussed. Regular exercise at least 3 times weekly will help to curb one's appetite, control weight and lead to better blood pressure control.     3. Mixed Hyperlipidemia    Patient instructed to take cholesterol lowering medications as prescribed and t

## 2018-12-17 RX ORDER — SIMVASTATIN 20 MG
20 TABLET ORAL NIGHTLY
Qty: 90 TABLET | Refills: 0 | Status: SHIPPED | OUTPATIENT
Start: 2018-12-17 | End: 2019-03-17

## 2019-01-10 ENCOUNTER — HOSPITAL ENCOUNTER (OUTPATIENT)
Age: 84
Discharge: HOME | End: 2019-01-10
Attending: EMERGENCY MEDICINE
Payer: MEDICARE

## 2019-01-10 VITALS
RESPIRATION RATE: 18 BRPM | WEIGHT: 195 LBS | BODY MASS INDEX: 30 KG/M2 | SYSTOLIC BLOOD PRESSURE: 153 MMHG | DIASTOLIC BLOOD PRESSURE: 59 MMHG | TEMPERATURE: 98 F | OXYGEN SATURATION: 100 % | HEART RATE: 65 BPM

## 2019-01-10 DIAGNOSIS — S61.012A LACERATION OF LEFT THUMB WITHOUT FOREIGN BODY WITHOUT DAMAGE TO NAIL, INITIAL ENCOUNTER: Primary | ICD-10-CM

## 2019-01-10 LAB — GLUCOSE BLDC GLUCOMTR-MCNC: 116 MG/DL (ref 70–99)

## 2019-01-10 PROCEDURE — 99214 OFFICE O/P EST MOD 30 MIN: CPT

## 2019-01-10 PROCEDURE — 82962 GLUCOSE BLOOD TEST: CPT

## 2019-01-10 PROCEDURE — 96365 THER/PROPH/DIAG IV INF INIT: CPT

## 2019-01-10 RX ORDER — CEFAZOLIN SODIUM 1 G/3ML
1 INJECTION, POWDER, FOR SOLUTION INTRAMUSCULAR; INTRAVENOUS ONCE
Status: COMPLETED | OUTPATIENT
Start: 2019-01-10 | End: 2019-01-10

## 2019-01-10 RX ORDER — CEPHALEXIN 500 MG/1
500 CAPSULE ORAL 3 TIMES DAILY
Qty: 21 CAPSULE | Refills: 0 | Status: SHIPPED | OUTPATIENT
Start: 2019-01-10 | End: 2019-02-25

## 2019-01-10 NOTE — ED PROVIDER NOTES
Patient Seen in: [facility] Immediate Care In [address] [place] [address]    History   Patient presents with:  Laceration Abrasion (integumentary)    Stated Complaint: laceration    HPI    Hong Lawrence is a 29-year-old male who presents to immediate care with a laceratio Physical Exam   Constitutional: He appears well-developed and well-nourished. HENT:   Head: Atraumatic. Eyes: Conjunctivae and EOM are normal. Pupils are equal, round, and reactive to light. Neck: Normal range of motion.    Cardiovascular: Normal ra cephALEXin (KEFLEX) 500 MG Oral Cap  Take 1 capsule (500 mg total) by mouth 3 (three) times daily.   Qty: 21 capsule Refills: 0

## 2019-01-10 NOTE — ED NOTES
ANGIO D/C INTACT PRESSURE DRESSING APPLIED. APPROX 50CC OF MAIN IVF INFUSED/ ANCEF FINISHED. DISCHARGE INST REVIEWED WITH FOLLOW UP INST. TO CALL HAND TODAY FOR APPOINTMENT.   GO TO THE ED FOR INCREASED SWELLING PAIN FEVER NEW OR WORSE Mera Young

## 2019-01-10 NOTE — ED INITIAL ASSESSMENT (HPI)
Sustained 1/2 in lac to dorsal left thumb 2 days ago with a knife and concerned its puffy. Lac clean dry no exudate no gross edema.  Hx of diabetis UTD 4/2018 with TDAP

## 2019-01-10 NOTE — ED NOTES
IV primary NS 250ML TVO with IVPB NS 50 ML  WITH 1 GM ancef over 30 min.started plan of care reviewed with pt. Bacitracin dressing to laceration after asepsis.

## 2019-02-25 ENCOUNTER — OFFICE VISIT (OUTPATIENT)
Dept: INTERNAL MEDICINE CLINIC | Facility: CLINIC | Age: 84
End: 2019-02-25
Payer: MEDICARE

## 2019-02-25 VITALS
RESPIRATION RATE: 16 BRPM | WEIGHT: 204 LBS | HEIGHT: 68 IN | SYSTOLIC BLOOD PRESSURE: 120 MMHG | DIASTOLIC BLOOD PRESSURE: 72 MMHG | BODY MASS INDEX: 30.92 KG/M2 | HEART RATE: 73 BPM

## 2019-02-25 DIAGNOSIS — E11.9 TYPE 2 DIABETES MELLITUS WITHOUT COMPLICATION, WITHOUT LONG-TERM CURRENT USE OF INSULIN (HCC): Primary | ICD-10-CM

## 2019-02-25 DIAGNOSIS — I10 ESSENTIAL HYPERTENSION WITH GOAL BLOOD PRESSURE LESS THAN 130/85: ICD-10-CM

## 2019-02-25 DIAGNOSIS — Z28.21 IMMUNIZATION NOT CARRIED OUT BECAUSE OF PATIENT REFUSAL: ICD-10-CM

## 2019-02-25 DIAGNOSIS — F17.200 TOBACCO USE DISORDER: ICD-10-CM

## 2019-02-25 DIAGNOSIS — E78.2 MIXED HYPERLIPIDEMIA: ICD-10-CM

## 2019-02-25 PROCEDURE — G0463 HOSPITAL OUTPT CLINIC VISIT: HCPCS | Performed by: INTERNAL MEDICINE

## 2019-02-25 PROCEDURE — 99214 OFFICE O/P EST MOD 30 MIN: CPT | Performed by: INTERNAL MEDICINE

## 2019-02-25 NOTE — PROGRESS NOTES
HPI:   1. Type 2 diabetes mellitus without complication, without long-term current use of insulin (HCC)    Juan C Drake is a 82year old male who presents for recheck of his diabetes. Patient’s FBS have been good.  Last visit with ophthalmologist was 2 05/12/2014 41     LDL Cholesterol (mg/dL)   Date Value   03/30/2018 75   05/18/2017 76   05/13/2016 68   05/08/2015 84   05/12/2014 79     AST (U/L)   Date Value   03/30/2018 18   05/18/2017 22   05/13/2016 21   12/23/2015 16   05/08/2015 21     ALT (U/L History    Tobacco Use      Smoking status: Current Every Day Smoker        Packs/day: 0.25        Years: 50.00        Pack years: 12.5      Smokeless tobacco: Never Used    Alcohol use:  Yes      Alcohol/week: 1.5 oz      Types: 3 Glasses of wine per week exactly as prescribed and to follow a low salt diet as discussed. Regular exercise at least 3 times weekly will help to curb one's appetite, control weight and lead to better blood pressure control.    - CBC WITH DIFFERENTIAL WITH PLATELET;  Future  - COMP

## 2019-03-14 RX ORDER — GLIMEPIRIDE 2 MG/1
2 TABLET ORAL
Qty: 90 TABLET | Refills: 0 | Status: SHIPPED | OUTPATIENT
Start: 2019-03-14 | End: 2019-06-08

## 2019-03-18 RX ORDER — SIMVASTATIN 20 MG
20 TABLET ORAL NIGHTLY
Qty: 90 TABLET | Refills: 0 | Status: SHIPPED | OUTPATIENT
Start: 2019-03-18 | End: 2019-07-02

## 2019-03-26 ENCOUNTER — LAB ENCOUNTER (OUTPATIENT)
Dept: LAB | Age: 84
End: 2019-03-26
Attending: INTERNAL MEDICINE
Payer: MEDICARE

## 2019-03-26 DIAGNOSIS — E11.9 TYPE 2 DIABETES MELLITUS WITHOUT COMPLICATION, WITHOUT LONG-TERM CURRENT USE OF INSULIN (HCC): ICD-10-CM

## 2019-03-26 DIAGNOSIS — E78.2 MIXED HYPERLIPIDEMIA: ICD-10-CM

## 2019-03-26 DIAGNOSIS — I10 ESSENTIAL HYPERTENSION WITH GOAL BLOOD PRESSURE LESS THAN 130/85: ICD-10-CM

## 2019-03-26 LAB
ALBUMIN SERPL-MCNC: 4 G/DL (ref 3.4–5)
ALBUMIN/GLOB SERPL: 1.3 {RATIO} (ref 1–2)
ALP LIVER SERPL-CCNC: 94 U/L (ref 45–117)
ALT SERPL-CCNC: 24 U/L (ref 16–61)
ANION GAP SERPL CALC-SCNC: 7 MMOL/L (ref 0–18)
AST SERPL-CCNC: 12 U/L (ref 15–37)
BASOPHILS # BLD AUTO: 0.04 X10(3) UL (ref 0–0.2)
BASOPHILS NFR BLD AUTO: 0.6 %
BILIRUB SERPL-MCNC: 0.3 MG/DL (ref 0.1–2)
BILIRUB UR QL: NEGATIVE
BUN BLD-MCNC: 27 MG/DL (ref 7–18)
BUN/CREAT SERPL: 22.5 (ref 10–20)
CALCIUM BLD-MCNC: 9.3 MG/DL (ref 8.5–10.1)
CHLORIDE SERPL-SCNC: 109 MMOL/L (ref 98–107)
CHOLEST SMN-MCNC: 160 MG/DL (ref ?–200)
CLARITY UR: CLEAR
CO2 SERPL-SCNC: 24 MMOL/L (ref 21–32)
COLOR UR: YELLOW
CREAT BLD-MCNC: 1.2 MG/DL (ref 0.7–1.3)
CREAT UR-SCNC: 145 MG/DL
DEPRECATED RDW RBC AUTO: 41.6 FL (ref 35.1–46.3)
EOSINOPHIL # BLD AUTO: 0.2 X10(3) UL (ref 0–0.7)
EOSINOPHIL NFR BLD AUTO: 3 %
ERYTHROCYTE [DISTWIDTH] IN BLOOD BY AUTOMATED COUNT: 13.4 % (ref 11–15)
EST. AVERAGE GLUCOSE BLD GHB EST-MCNC: 151 MG/DL (ref 68–126)
GLOBULIN PLAS-MCNC: 3 G/DL (ref 2.8–4.4)
GLUCOSE BLD-MCNC: 143 MG/DL (ref 70–99)
GLUCOSE UR-MCNC: NEGATIVE MG/DL
HBA1C MFR BLD HPLC: 6.9 % (ref ?–5.7)
HCT VFR BLD AUTO: 37.7 % (ref 39–53)
HDLC SERPL-MCNC: 39 MG/DL (ref 40–59)
HGB BLD-MCNC: 12.1 G/DL (ref 13–17.5)
HGB UR QL STRIP.AUTO: NEGATIVE
IMM GRANULOCYTES # BLD AUTO: 0.04 X10(3) UL (ref 0–1)
IMM GRANULOCYTES NFR BLD: 0.6 %
KETONES UR-MCNC: NEGATIVE MG/DL
LDLC SERPL CALC-MCNC: 83 MG/DL (ref ?–100)
LEUKOCYTE ESTERASE UR QL STRIP.AUTO: NEGATIVE
LYMPHOCYTES # BLD AUTO: 1.98 X10(3) UL (ref 1–4)
LYMPHOCYTES NFR BLD AUTO: 29.6 %
M PROTEIN MFR SERPL ELPH: 7 G/DL (ref 6.4–8.2)
MCH RBC QN AUTO: 27.8 PG (ref 26–34)
MCHC RBC AUTO-ENTMCNC: 32.1 G/DL (ref 31–37)
MCV RBC AUTO: 86.5 FL (ref 80–100)
MICROALBUMIN UR-MCNC: 3.45 MG/DL
MICROALBUMIN/CREAT 24H UR-RTO: 23.8 UG/MG (ref ?–30)
MONOCYTES # BLD AUTO: 0.49 X10(3) UL (ref 0.1–1)
MONOCYTES NFR BLD AUTO: 7.3 %
NEUTROPHILS # BLD AUTO: 3.93 X10 (3) UL (ref 1.5–7.7)
NEUTROPHILS # BLD AUTO: 3.93 X10(3) UL (ref 1.5–7.7)
NEUTROPHILS NFR BLD AUTO: 58.9 %
NITRITE UR QL STRIP.AUTO: NEGATIVE
NONHDLC SERPL-MCNC: 121 MG/DL (ref ?–130)
OSMOLALITY SERPL CALC.SUM OF ELEC: 298 MOSM/KG (ref 275–295)
PH UR: 5 [PH] (ref 5–8)
PLATELET # BLD AUTO: 207 10(3)UL (ref 150–450)
POTASSIUM SERPL-SCNC: 4.8 MMOL/L (ref 3.5–5.1)
PROT UR-MCNC: NEGATIVE MG/DL
RBC # BLD AUTO: 4.36 X10(6)UL (ref 3.8–5.8)
SODIUM SERPL-SCNC: 140 MMOL/L (ref 136–145)
SP GR UR STRIP: 1.02 (ref 1–1.03)
TRIGL SERPL-MCNC: 191 MG/DL (ref 30–149)
TSI SER-ACNC: 4.64 MIU/ML (ref 0.36–3.74)
UROBILINOGEN UR STRIP-ACNC: <2
VIT C UR-MCNC: NEGATIVE MG/DL
VLDLC SERPL CALC-MCNC: 38 MG/DL (ref 0–30)
WBC # BLD AUTO: 6.7 X10(3) UL (ref 4–11)

## 2019-03-26 PROCEDURE — 82043 UR ALBUMIN QUANTITATIVE: CPT

## 2019-03-26 PROCEDURE — 80061 LIPID PANEL: CPT

## 2019-03-26 PROCEDURE — 85025 COMPLETE CBC W/AUTO DIFF WBC: CPT

## 2019-03-26 PROCEDURE — 80053 COMPREHEN METABOLIC PANEL: CPT

## 2019-03-26 PROCEDURE — 36415 COLL VENOUS BLD VENIPUNCTURE: CPT

## 2019-03-26 PROCEDURE — 84443 ASSAY THYROID STIM HORMONE: CPT

## 2019-03-26 PROCEDURE — 82570 ASSAY OF URINE CREATININE: CPT

## 2019-03-26 PROCEDURE — 83036 HEMOGLOBIN GLYCOSYLATED A1C: CPT

## 2019-03-26 PROCEDURE — 81003 URINALYSIS AUTO W/O SCOPE: CPT

## 2019-03-29 DIAGNOSIS — E05.90 HYPERTHYROIDISM: Primary | ICD-10-CM

## 2019-04-16 ENCOUNTER — APPOINTMENT (OUTPATIENT)
Dept: LAB | Age: 84
End: 2019-04-16
Attending: INTERNAL MEDICINE
Payer: MEDICARE

## 2019-04-16 DIAGNOSIS — E05.90 HYPERTHYROIDISM: ICD-10-CM

## 2019-04-16 PROCEDURE — 36415 COLL VENOUS BLD VENIPUNCTURE: CPT

## 2019-04-16 PROCEDURE — 84443 ASSAY THYROID STIM HORMONE: CPT

## 2019-06-03 ENCOUNTER — OFFICE VISIT (OUTPATIENT)
Dept: INTERNAL MEDICINE CLINIC | Facility: CLINIC | Age: 84
End: 2019-06-03
Payer: MEDICARE

## 2019-06-03 VITALS
SYSTOLIC BLOOD PRESSURE: 138 MMHG | DIASTOLIC BLOOD PRESSURE: 74 MMHG | BODY MASS INDEX: 30.92 KG/M2 | HEART RATE: 57 BPM | HEIGHT: 68 IN | WEIGHT: 204 LBS | RESPIRATION RATE: 16 BRPM

## 2019-06-03 DIAGNOSIS — I10 ESSENTIAL HYPERTENSION WITH GOAL BLOOD PRESSURE LESS THAN 130/85: ICD-10-CM

## 2019-06-03 DIAGNOSIS — F17.200 TOBACCO USE DISORDER: ICD-10-CM

## 2019-06-03 DIAGNOSIS — E11.9 TYPE 2 DIABETES MELLITUS WITHOUT COMPLICATION, WITHOUT LONG-TERM CURRENT USE OF INSULIN (HCC): Primary | ICD-10-CM

## 2019-06-03 DIAGNOSIS — E78.2 MIXED HYPERLIPIDEMIA: ICD-10-CM

## 2019-06-03 DIAGNOSIS — E03.9 ACQUIRED HYPOTHYROIDISM: ICD-10-CM

## 2019-06-03 PROCEDURE — G0463 HOSPITAL OUTPT CLINIC VISIT: HCPCS | Performed by: INTERNAL MEDICINE

## 2019-06-03 PROCEDURE — 99214 OFFICE O/P EST MOD 30 MIN: CPT | Performed by: INTERNAL MEDICINE

## 2019-06-03 NOTE — PROGRESS NOTES
HPI:   1. Type 2 diabetes mellitus without complication, without long-term current use of insulin (HCC)    Kesha Steele is a 82year old male who presents for recheck of his diabetes. Patient’s FBS have been good.  Last visit with ophthalmologist was 2 05/12/2014 167     HDL Cholesterol (mg/dL)   Date Value   03/26/2019 39 (L)   03/30/2018 35   05/18/2017 35     HDL CHOLESTEROL (P) (mg/dL)   Date Value   05/13/2016 35   05/08/2015 47   05/12/2014 41     LDL Cholesterol (mg/dL)   Date Value   03/26/2019 adenoma of colon 2009    per ng polypectomy   • Unspecified essential hypertension       Past Surgical History:   Procedure Laterality Date   • ELECTROCARDIOGRAM, COMPLETE  10/29/13    scanned into media tab   • OTHER SURGICAL HISTORY Right       Social Hi daily. Check blood work. Check HgA1c.  If over 7 much will add glimepiride 2 mg to treatment since fastings running in 140s -150s or so.    2. Essential hypertension with goal blood pressure less than 130/85    Patient instructed to take anti-hypertensive m

## 2019-06-08 RX ORDER — GLIMEPIRIDE 2 MG/1
2 TABLET ORAL
Qty: 90 TABLET | Refills: 1 | Status: SHIPPED | OUTPATIENT
Start: 2019-06-08 | End: 2019-12-01

## 2019-06-08 NOTE — TELEPHONE ENCOUNTER
Refill passed per [facility] protocol.   Diabetes Medications  Protocol Criteria:  · Appointment scheduled in the past 6 months or the next 3 months  · A1C < 7.5 in the past 6 months  · Creatinine in the past 12 months  · Creatinine result < 1.5   Rece

## 2019-07-02 RX ORDER — SIMVASTATIN 20 MG
20 TABLET ORAL NIGHTLY
Qty: 90 TABLET | Refills: 1 | Status: SHIPPED | OUTPATIENT
Start: 2019-07-02 | End: 2019-12-17

## 2019-07-02 NOTE — TELEPHONE ENCOUNTER
Refill passed per [facility], [facility] protocol.   Cholesterol Medications  Protocol Criteria:  · Appointment scheduled in the past 12 months or in the next 3 months  · ALT & LDL on file in the past 12 months  · ALT result < 80  · LDL result <130   Recent Outpat

## 2019-07-16 ENCOUNTER — LAB ENCOUNTER (OUTPATIENT)
Dept: LAB | Age: 84
End: 2019-07-16
Attending: PHYSICIAN ASSISTANT
Payer: MEDICARE

## 2019-07-16 DIAGNOSIS — E03.8 SUBCLINICAL HYPOTHYROIDISM: ICD-10-CM

## 2019-07-16 LAB
T4 FREE SERPL-MCNC: 0.8 NG/DL (ref 0.8–1.7)
TSI SER-ACNC: 6.85 MIU/ML (ref 0.36–3.74)

## 2019-07-16 PROCEDURE — 84443 ASSAY THYROID STIM HORMONE: CPT

## 2019-07-16 PROCEDURE — 36415 COLL VENOUS BLD VENIPUNCTURE: CPT

## 2019-07-16 PROCEDURE — 84439 ASSAY OF FREE THYROXINE: CPT

## 2019-07-18 ENCOUNTER — TELEPHONE (OUTPATIENT)
Dept: INTERNAL MEDICINE CLINIC | Facility: CLINIC | Age: 84
End: 2019-07-18

## 2019-07-18 RX ORDER — LEVOTHYROXINE SODIUM 0.03 MG/1
25 TABLET ORAL
Qty: 90 TABLET | Refills: 3 | Status: SHIPPED | OUTPATIENT
Start: 2019-07-18 | End: 2020-06-30

## 2019-07-18 NOTE — TELEPHONE ENCOUNTER
Notes recorded by Shannon Quick MD on 7/18/2019 at 9:01 AM CDT  Your TSH remains mildly  elevated, I would like you to start a low dose of thyroid medication. I have sent the prescription to your pharmacy. Take the pill every morning before eating.

## 2019-07-20 NOTE — TELEPHONE ENCOUNTER
Spoke with patient, informed of PVR notes below. Patient already has the RX and informed of blood draw in three months.   Patient verbalizes understanding and agrees

## 2019-09-05 ENCOUNTER — OFFICE VISIT (OUTPATIENT)
Dept: INTERNAL MEDICINE CLINIC | Facility: CLINIC | Age: 84
End: 2019-09-05
Payer: MEDICARE

## 2019-09-05 VITALS
SYSTOLIC BLOOD PRESSURE: 128 MMHG | RESPIRATION RATE: 16 BRPM | HEIGHT: 68 IN | BODY MASS INDEX: 29.86 KG/M2 | DIASTOLIC BLOOD PRESSURE: 57 MMHG | WEIGHT: 197 LBS | HEART RATE: 60 BPM

## 2019-09-05 DIAGNOSIS — E78.2 MIXED HYPERLIPIDEMIA: ICD-10-CM

## 2019-09-05 DIAGNOSIS — E11.9 TYPE 2 DIABETES MELLITUS WITHOUT COMPLICATION, WITHOUT LONG-TERM CURRENT USE OF INSULIN (HCC): Primary | ICD-10-CM

## 2019-09-05 DIAGNOSIS — I10 ESSENTIAL HYPERTENSION WITH GOAL BLOOD PRESSURE LESS THAN 130/85: ICD-10-CM

## 2019-09-05 DIAGNOSIS — E03.9 ACQUIRED HYPOTHYROIDISM: ICD-10-CM

## 2019-09-05 DIAGNOSIS — F17.200 TOBACCO USE DISORDER: ICD-10-CM

## 2019-09-05 PROCEDURE — G0463 HOSPITAL OUTPT CLINIC VISIT: HCPCS | Performed by: INTERNAL MEDICINE

## 2019-09-05 PROCEDURE — 99214 OFFICE O/P EST MOD 30 MIN: CPT | Performed by: INTERNAL MEDICINE

## 2019-09-05 NOTE — PROGRESS NOTES
HPI:   1. Type 2 diabetes mellitus without complication, without long-term current use of insulin (HCC)    Lisa Blue is a 82year old male who presents for recheck of his diabetes. Patient’s FBS have been good.  Last visit with ophthalmologist was 2 05/12/2014 167     HDL Cholesterol (mg/dL)   Date Value   03/26/2019 39 (L)   03/30/2018 35   05/18/2017 35     HDL CHOLESTEROL (P) (mg/dL)   Date Value   05/13/2016 35   05/08/2015 47   05/12/2014 41     LDL Cholesterol (mg/dL)   Date Value   03/26/2019 Tubular adenoma of colon 2009    per ng polypectomy   • Unspecified essential hypertension       Past Surgical History:   Procedure Laterality Date   • ELECTROCARDIOGRAM, COMPLETE  10/29/13    scanned into media tab   • OTHER SURGICAL HISTORY Right       S exercise,  check feet daily. Check blood work. Check HgA1c. If over 7 much will add glimepiride 2 mg to treatment since fastings running in 140s -150s or so. - HEMOGLOBIN A1C; Future    2.  Essential hypertension with goal blood pressure less than 130/85

## 2019-09-11 ENCOUNTER — LAB ENCOUNTER (OUTPATIENT)
Dept: LAB | Age: 84
End: 2019-09-11
Attending: PHYSICIAN ASSISTANT
Payer: MEDICARE

## 2019-09-11 DIAGNOSIS — E11.9 TYPE 2 DIABETES MELLITUS WITHOUT COMPLICATION, WITHOUT LONG-TERM CURRENT USE OF INSULIN (HCC): ICD-10-CM

## 2019-09-11 DIAGNOSIS — E03.8 SUBCLINICAL HYPOTHYROIDISM: ICD-10-CM

## 2019-09-11 LAB
EST. AVERAGE GLUCOSE BLD GHB EST-MCNC: 137 MG/DL (ref 68–126)
HBA1C MFR BLD HPLC: 6.4 % (ref ?–5.7)
T4 FREE SERPL-MCNC: 0.9 NG/DL (ref 0.8–1.7)
TSI SER-ACNC: 4.34 MIU/ML (ref 0.36–3.74)

## 2019-09-11 PROCEDURE — 36415 COLL VENOUS BLD VENIPUNCTURE: CPT

## 2019-09-11 PROCEDURE — 84439 ASSAY OF FREE THYROXINE: CPT

## 2019-09-11 PROCEDURE — 84443 ASSAY THYROID STIM HORMONE: CPT

## 2019-09-11 PROCEDURE — 83036 HEMOGLOBIN GLYCOSYLATED A1C: CPT

## 2019-09-12 RX ORDER — LISINOPRIL 10 MG/1
10 TABLET ORAL DAILY
Qty: 90 TABLET | Refills: 1 | Status: SHIPPED | OUTPATIENT
Start: 2019-09-12 | End: 2020-03-04

## 2019-09-12 NOTE — TELEPHONE ENCOUNTER
Refill passed per [facility] protocol.   Hypertensive Medications  Protocol Criteria:  · Appointment scheduled in the past 6 months or in the next 3 months  · BMP or CMP in the past 12 months  · Creatinine result < 2  Recent Outpatient Visits

## 2019-10-14 ENCOUNTER — NURSE TRIAGE (OUTPATIENT)
Dept: INTERNAL MEDICINE CLINIC | Facility: CLINIC | Age: 84
End: 2019-10-14

## 2019-10-14 NOTE — TELEPHONE ENCOUNTER
Action Requested: Summary for Provider     []  Critical Lab, Recommendations Needed  [] Need Additional Advice  []   FYI    []   Need Orders  [] Need Medications Sent to Pharmacy  []  Other     SUMMARY: Appt scheduled for 10/15/19 11:20am with Dr Tesha Cardenas

## 2019-10-15 ENCOUNTER — OFFICE VISIT (OUTPATIENT)
Dept: INTERNAL MEDICINE CLINIC | Facility: CLINIC | Age: 84
End: 2019-10-15
Payer: MEDICARE

## 2019-10-15 VITALS
TEMPERATURE: 98 F | WEIGHT: 197 LBS | HEART RATE: 61 BPM | BODY MASS INDEX: 29.86 KG/M2 | HEIGHT: 68 IN | DIASTOLIC BLOOD PRESSURE: 58 MMHG | SYSTOLIC BLOOD PRESSURE: 138 MMHG

## 2019-10-15 DIAGNOSIS — E11.9 TYPE 2 DIABETES MELLITUS WITHOUT COMPLICATION, WITHOUT LONG-TERM CURRENT USE OF INSULIN (HCC): Primary | ICD-10-CM

## 2019-10-15 DIAGNOSIS — M54.41 ACUTE RIGHT-SIDED LOW BACK PAIN WITH RIGHT-SIDED SCIATICA: ICD-10-CM

## 2019-10-15 DIAGNOSIS — E78.2 MIXED HYPERLIPIDEMIA: ICD-10-CM

## 2019-10-15 DIAGNOSIS — I10 ESSENTIAL HYPERTENSION WITH GOAL BLOOD PRESSURE LESS THAN 130/85: ICD-10-CM

## 2019-10-15 PROCEDURE — G0463 HOSPITAL OUTPT CLINIC VISIT: HCPCS | Performed by: INTERNAL MEDICINE

## 2019-10-15 PROCEDURE — 99214 OFFICE O/P EST MOD 30 MIN: CPT | Performed by: INTERNAL MEDICINE

## 2019-10-15 NOTE — PROGRESS NOTES
HPI:   1. Type 2 diabetes mellitus without complication, without long-term current use of insulin (HCC)    Santino Chirinos is a 82year old male who presents for recheck of his diabetes. Patient’s FBS have been good.  Last visit with ophthalmologist was 2 Cholesterol, Total (mg/dL)   Date Value   03/26/2019 160   03/30/2018 152   05/18/2017 153   05/13/2016 175   05/08/2015 164   05/12/2014 167     HDL Cholesterol (mg/dL)   Date Value   03/26/2019 39 (L)   03/30/2018 35   05/18/2017 35     HDL CHOLESTER • Obesity, unspecified    • Osteoarthrosis, unspecified whether generalized or localized, unspecified site    • Tubular adenoma of colon 2009    per ng polypectomy   • Unspecified essential hypertension       Past Surgical History:   Procedure Laterality [facility] eye in last months Recommendations are: RENEW present meds for 1 year, check labs, lose wgt with carbohydrate controlled diet and exercise,  check feet daily. Check blood work. Check HgA1c.  If over 7 much will add glimepiride 2 mg to treatment since smoking including nicotine patches, zyban, and chantix. Electronic cigarettes can help decrease nicotine intake and make quitting easier.  Advised that I am able to help them through whatever means available to help them quit and they should contact me when

## 2019-10-16 NOTE — TELEPHONE ENCOUNTER
Refill passed per [facility], [facility] protocol.   Hypertensive Medications  Protocol Criteria:  · Appointment scheduled in the past 6 months or in the next 3 months  · BMP or CMP in the past 12 months  · Creatinine result < 2  Recent Outpatient Visits

## 2019-10-18 ENCOUNTER — TELEPHONE (OUTPATIENT)
Dept: INTERNAL MEDICINE CLINIC | Facility: CLINIC | Age: 84
End: 2019-10-18

## 2019-10-18 RX ORDER — LIDOCAINE 4 G/G
1 PATCH TOPICAL EVERY 24 HOURS
Qty: 6 PATCH | Refills: 1 | Status: SHIPPED | OUTPATIENT
Start: 2019-10-18 | End: 2019-11-11

## 2019-10-18 NOTE — TELEPHONE ENCOUNTER
I have put in an rx for the patches, if they are covered he can try them, one patch in 24 hours, can cut them If needed. Or try salon paus over the counter.

## 2019-10-18 NOTE — TELEPHONE ENCOUNTER
Informed daughter of DANETTE Cabral's advice as per below and that medication sent to pharmacy. Pt voiced understanding and agrees.

## 2019-10-18 NOTE — TELEPHONE ENCOUNTER
Patient daughter calling  with condition update ( on ZBIGNIEW )  Father has been following LOV 10/15/2019  advise and pain has not improved at all in the past few days. See entry below:    4.  Acute right-sided low back pain with right-sided sciatica     Ice you

## 2019-10-21 ENCOUNTER — TELEPHONE (OUTPATIENT)
Dept: OTHER | Age: 84
End: 2019-10-21

## 2019-10-21 DIAGNOSIS — M79.604 RIGHT LEG PAIN: Primary | ICD-10-CM

## 2019-10-21 DIAGNOSIS — M54.41 ACUTE RIGHT-SIDED LOW BACK PAIN WITH RIGHT-SIDED SCIATICA: ICD-10-CM

## 2019-10-21 NOTE — TELEPHONE ENCOUNTER
Daughter calling states patient tried Lidocaine patch and advil and pain in right leg not improving and still radiating down right leg. Also has numbness in that leg. Does not want sedating drugs as he drives. Asking how to proceed from here?   Does he

## 2019-10-21 NOTE — TELEPHONE ENCOUNTER
Patient should see physiatry for further management, we can also do physical therapy if he is willing.

## 2019-10-22 NOTE — TELEPHONE ENCOUNTER
Patient's daughter Enriqueta Contreras calling states patient is requesting to go to [facility] [id number] in [address] [place] [address] for his physical therapy. Telephone# [phone], fax# [phone]. Enriqueta Contreras would like a call back once the order is complete her number is [phone].  Stanley

## 2019-10-22 NOTE — TELEPHONE ENCOUNTER
Advised patient daughter Tawanna Opitz of Dr Shakir Knapply  note. Patient's daughter verbalized understanding. Phone numbers given for physiatry [phone] and for physical therapy . Will schedule appointments.

## 2019-10-22 NOTE — TELEPHONE ENCOUNTER
Patient's daughter is asking for a recommendation for a specific physiatry doctor and is also agreeable to PT. Referrals pended. Please advise.

## 2019-11-05 ENCOUNTER — MED REC SCAN ONLY (OUTPATIENT)
Dept: INTERNAL MEDICINE CLINIC | Facility: CLINIC | Age: 84
End: 2019-11-05

## 2019-11-05 ENCOUNTER — TELEPHONE (OUTPATIENT)
Dept: NEUROLOGY | Facility: CLINIC | Age: 84
End: 2019-11-05

## 2019-11-05 ENCOUNTER — OFFICE VISIT (OUTPATIENT)
Dept: NEUROLOGY | Facility: CLINIC | Age: 84
End: 2019-11-05
Payer: MEDICARE

## 2019-11-05 ENCOUNTER — HOSPITAL ENCOUNTER (OUTPATIENT)
Dept: GENERAL RADIOLOGY | Age: 84
Discharge: HOME | End: 2019-11-05
Attending: PHYSICAL MEDICINE & REHABILITATION
Payer: MEDICARE

## 2019-11-05 VITALS
HEART RATE: 66 BPM | HEIGHT: 68 IN | SYSTOLIC BLOOD PRESSURE: 126 MMHG | DIASTOLIC BLOOD PRESSURE: 74 MMHG | RESPIRATION RATE: 17 BRPM | WEIGHT: 197 LBS | BODY MASS INDEX: 29.86 KG/M2

## 2019-11-05 DIAGNOSIS — S76.019A STRAIN OF GLUTEUS MEDIUS, UNSPECIFIED LATERALITY, INITIAL ENCOUNTER: ICD-10-CM

## 2019-11-05 DIAGNOSIS — E11.9 TYPE 2 DIABETES MELLITUS WITHOUT COMPLICATION, WITHOUT LONG-TERM CURRENT USE OF INSULIN (HCC): ICD-10-CM

## 2019-11-05 DIAGNOSIS — M70.61 GREATER TROCHANTERIC BURSITIS OF BOTH HIPS: ICD-10-CM

## 2019-11-05 DIAGNOSIS — I10 ESSENTIAL HYPERTENSION WITH GOAL BLOOD PRESSURE LESS THAN 130/85: ICD-10-CM

## 2019-11-05 DIAGNOSIS — M70.62 GREATER TROCHANTERIC BURSITIS OF BOTH HIPS: ICD-10-CM

## 2019-11-05 DIAGNOSIS — E11.9 TYPE 2 DIABETES MELLITUS WITHOUT COMPLICATION, WITHOUT LONG-TERM CURRENT USE OF INSULIN (HCC): Primary | ICD-10-CM

## 2019-11-05 DIAGNOSIS — M54.59 LUMBAR TRIGGER POINT SYNDROME: ICD-10-CM

## 2019-11-05 PROCEDURE — 73521 X-RAY EXAM HIPS BI 2 VIEWS: CPT | Performed by: PHYSICAL MEDICINE & REHABILITATION

## 2019-11-05 PROCEDURE — 99204 OFFICE O/P NEW MOD 45 MIN: CPT | Performed by: PHYSICAL MEDICINE & REHABILITATION

## 2019-11-05 PROCEDURE — 72110 X-RAY EXAM L-2 SPINE 4/>VWS: CPT | Performed by: PHYSICAL MEDICINE & REHABILITATION

## 2019-11-05 NOTE — PROGRESS NOTES
Location of pain: Right Hip Pain   Timeline of pain: 5 weeks    Characterize the pain: sharp pain   Worse with: Morning, car rides   Medications used: advil  Previous Injections: none  Previous Imaging: none  Previous Therapies for this condition: yes  Ish

## 2019-11-05 NOTE — PATIENT INSTRUCTIONS
1) Stop taking ibuprofen and start taking Tylenol 500-1000 mg every 6-8 hours as needed for pain. No more than 3000 mg daily. 2) Get Xr of the pelvis and lumbar spine on your way out today. No news is good news.    3) Begin formal physical therapy at [facility]

## 2019-11-05 NOTE — TELEPHONE ENCOUNTER
Medicare Online for authorization of procedure RIGHT greater trochanter CSI under ultrasound guidance CPT Code 38872,42815 . Procedure is a covered benefit and does not require authorization. Will inform Nursing.

## 2019-11-05 NOTE — H&P
[address] H&P    Requesting Physician: Matti Ellison MD    Chief Complaint (Reason for Visit):  Patient presents with:  Hip Pain: Pt is present with right hip pain       History of Present Il 50.00        Pack years: 12.5      Smokeless tobacco: Never Used    Substance and Sexual Activity      Alcohol use:  Yes        Alcohol/week: 2.5 standard drinks        Types: 3 Glasses of wine per week      Drug use: No      Sexual activity: Not on file Extra-occular movements intact. Ears: No auricular hematoma or deformities  Mouth: No lesions or ulcerations  Heart: peripheral pulses intact. Normal capillary refill.    Lungs: Non-labored respirations  Abdomen: No abdominal guarding  Extremities: No low ]      Radiology Imaging:  NA      ASSESSMENT AND PLAN:  The patient is a pleasant 82-year-old male who is coming in complaining of right lateral hip pain for the last 5 weeks without an inciting event.   I do believe that he has gluteus medius enthesopat

## 2019-11-08 ENCOUNTER — NURSE TRIAGE (OUTPATIENT)
Dept: INTERNAL MEDICINE CLINIC | Facility: CLINIC | Age: 84
End: 2019-11-08

## 2019-11-08 NOTE — TELEPHONE ENCOUNTER
Please reply to pool: EM RN TRIAGE    Action Requested: Summary for Provider     []  Critical Lab, Recommendations Needed  [x] Need Additional Advice  []   FYI    [x]   Need Orders  [] Need Medications Sent to Pharmacy  []  Other     SUMMARY: Per protocol

## 2019-11-08 NOTE — TELEPHONE ENCOUNTER
Spoke with patient and advised of MD recommendations. He wrote the medication name down. No further questions at this time.

## 2019-11-08 NOTE — TELEPHONE ENCOUNTER
Per Kai De La Torre daughter of patient,  Monday morning when he was in bed or turn his head he feels dizzy and up to now patient still feeling that dizziness. Transfer call to Triage.

## 2019-11-08 NOTE — TELEPHONE ENCOUNTER
Have him try some antivert over the counter, possibly could be vertigo and advise a follow up with pcp next week or week after.  If sx worsen, BP changes or other sx develop then ER or UC/IC eval

## 2019-11-11 ENCOUNTER — TELEPHONE (OUTPATIENT)
Dept: OTHER | Age: 84
End: 2019-11-11

## 2019-11-11 ENCOUNTER — OFFICE VISIT (OUTPATIENT)
Dept: NEUROLOGY | Facility: CLINIC | Age: 84
End: 2019-11-11
Payer: MEDICARE

## 2019-11-11 VITALS
RESPIRATION RATE: 16 BRPM | DIASTOLIC BLOOD PRESSURE: 70 MMHG | HEART RATE: 68 BPM | SYSTOLIC BLOOD PRESSURE: 130 MMHG | HEIGHT: 68 IN | WEIGHT: 197 LBS | BODY MASS INDEX: 29.86 KG/M2

## 2019-11-11 DIAGNOSIS — E78.2 MIXED HYPERLIPIDEMIA: ICD-10-CM

## 2019-11-11 DIAGNOSIS — M70.61 GREATER TROCHANTERIC BURSITIS OF BOTH HIPS: ICD-10-CM

## 2019-11-11 DIAGNOSIS — M43.16 SPONDYLOLISTHESIS AT L4-L5 LEVEL: Primary | ICD-10-CM

## 2019-11-11 DIAGNOSIS — M70.62 GREATER TROCHANTERIC BURSITIS OF BOTH HIPS: ICD-10-CM

## 2019-11-11 DIAGNOSIS — S76.019A STRAIN OF GLUTEUS MEDIUS, UNSPECIFIED LATERALITY, INITIAL ENCOUNTER: ICD-10-CM

## 2019-11-11 DIAGNOSIS — I10 ESSENTIAL HYPERTENSION WITH GOAL BLOOD PRESSURE LESS THAN 130/85: ICD-10-CM

## 2019-11-11 DIAGNOSIS — H81.10 BENIGN PAROXYSMAL POSITIONAL VERTIGO, UNSPECIFIED LATERALITY: ICD-10-CM

## 2019-11-11 PROCEDURE — 20611 DRAIN/INJ JOINT/BURSA W/US: CPT | Performed by: PHYSICAL MEDICINE & REHABILITATION

## 2019-11-11 NOTE — TELEPHONE ENCOUNTER
Medicare normally covers physical therapy ordered by MDs. Not sure why this therapy would not be covered. Call medicare to be sure but he could use the therapy regardless.

## 2019-11-11 NOTE — TELEPHONE ENCOUNTER
Pt's daughter, Jesus Stephens (ZBIGNIEW in place), called to follow up on information given to pt on Friday 11/8/19, states pt didn't understand instructions given to him on Friday.  Daughter states pt saw Dr. Fernanda Reyes today and was given prescription for physical therapy

## 2019-11-11 NOTE — PROCEDURES
[address] Injection Procedure Note    CHIEF COMPLAINT:  Patient presents with: Injection: Patient presents for RIGHT greater trochanter CSI under ultrasound guidance.  Rated pain dry dressing in place. The patient tolerated the procedure well. No immediate complications were noted. Prior to the injection, pain was rated 5/10 in intensity. After the injection, pain was rated at a 0/10 in intensity.     Patient verbalized Ceil Cotton

## 2019-11-11 NOTE — PATIENT INSTRUCTIONS
Post Injection Instructions     1. Please do not do anything strenuous over the next two days (if you had a knee injection do not walk more than 2 city blocks, do not attend any aerobic classes, do not run, no heavy lifting, no prolong standing).   2. You m which one vertebra has moved forward or backward, in relation to the one above or below it. This causes a crack (stress fracture) in the areas that link the vertebrae together. This may put pressure on the annulus, stretch the disk, and irritate nerves.   D motion at the involved joint. Follow-up care  Follow up with your healthcare provider if you are not improving after 5 to 7 days of treatment.   When to seek medical advice  Call your healthcare provider right away if any of these occur:  · Redness over th

## 2019-11-12 RX ORDER — LIDOCAINE HYDROCHLORIDE 10 MG/ML
2 INJECTION, SOLUTION INFILTRATION; PERINEURAL ONCE
Status: COMPLETED | OUTPATIENT
Start: 2019-11-11 | End: 2019-11-11

## 2019-11-12 RX ORDER — LIDOCAINE HYDROCHLORIDE 10 MG/ML
5 INJECTION, SOLUTION INFILTRATION; PERINEURAL ONCE
Status: COMPLETED | OUTPATIENT
Start: 2019-11-11 | End: 2019-11-11

## 2019-11-12 RX ORDER — TRIAMCINOLONE ACETONIDE 40 MG/ML
40 INJECTION, SUSPENSION INTRA-ARTICULAR; INTRAMUSCULAR ONCE
Status: COMPLETED | OUTPATIENT
Start: 2019-11-12 | End: 2019-11-11

## 2019-11-12 NOTE — TELEPHONE ENCOUNTER
Daughter stating she wasn't sure because Dr. Mary Grace Beyer said that he doesn't usually send patients to PT with vertigo diagnosis but he did order it.  Daughter wondered if maybe it would be better for Jayde Le to order the PT because Dr. Mary Jimenez is the MD f

## 2019-11-12 NOTE — TELEPHONE ENCOUNTER
Spoke with daughter Myesha([facility]), advised Dr Evangelina Hart's note and verbalized understanding. With FU OV already schedule on 12/9/19.

## 2019-11-14 ENCOUNTER — TELEPHONE (OUTPATIENT)
Dept: NEUROLOGY | Facility: CLINIC | Age: 84
End: 2019-11-14

## 2019-11-14 ENCOUNTER — HOSPITAL ENCOUNTER (OUTPATIENT)
Dept: MRI IMAGING | Facility: HOSPITAL | Age: 84
Discharge: HOME | End: 2019-11-14
Attending: PHYSICAL MEDICINE & REHABILITATION
Payer: MEDICARE

## 2019-11-14 DIAGNOSIS — M43.16 SPONDYLOLISTHESIS AT L4-L5 LEVEL: ICD-10-CM

## 2019-11-14 DIAGNOSIS — M70.62 GREATER TROCHANTERIC BURSITIS OF BOTH HIPS: ICD-10-CM

## 2019-11-14 DIAGNOSIS — M43.16 SPONDYLOLISTHESIS AT L4-L5 LEVEL: Primary | ICD-10-CM

## 2019-11-14 DIAGNOSIS — M54.59 LUMBAR TRIGGER POINT SYNDROME: ICD-10-CM

## 2019-11-14 DIAGNOSIS — M54.16 LUMBAR RADICULOPATHY, ACUTE: ICD-10-CM

## 2019-11-14 DIAGNOSIS — M51.37 DDD (DEGENERATIVE DISC DISEASE), LUMBOSACRAL: ICD-10-CM

## 2019-11-14 DIAGNOSIS — M70.61 GREATER TROCHANTERIC BURSITIS OF BOTH HIPS: ICD-10-CM

## 2019-11-14 PROCEDURE — 72148 MRI LUMBAR SPINE W/O DYE: CPT | Performed by: PHYSICAL MEDICINE & REHABILITATION

## 2019-11-14 RX ORDER — CYCLOBENZAPRINE HCL 5 MG
TABLET ORAL
Qty: 90 TABLET | Refills: 0 | Status: SHIPPED | OUTPATIENT
Start: 2019-11-14 | End: 2019-12-17

## 2019-11-14 NOTE — TELEPHONE ENCOUNTER
Start cyclobenzaprine 5 mg 1-2 tablets three times per day as needed for spasms. Do not operate heavy machinery while on this medication as it may make you sleepy.   Please make sure patient is supervision of this medication may make him very sleepy and pot

## 2019-11-14 NOTE — TELEPHONE ENCOUNTER
Order placed. Please let patient know. Mike Alexander.  Colleen Bey MD, [facility]  Physical Medicine and Rehabilitation/Sports Medicine  [facility]

## 2019-11-14 NOTE — TELEPHONE ENCOUNTER
Pt's daughter is calling because her dad had an injection on Monday and he states that hes in worst pain than he started with. Pt would like to get an MRI at this time.

## 2019-11-14 NOTE — TELEPHONE ENCOUNTER
Medicare online for insurance coverage of MRI SPINE LUMBAR CPT CODE 69537 . Insurance was verified and procedure is a cover benefit and does not require authorization. Will inform patient.  L/m for patient informing of approval. Can proceed to schedule appo

## 2019-11-15 NOTE — TELEPHONE ENCOUNTER
Spoke with daughter Yolanda Aldana to inform her of the Rx that was sent to the Pharmacy.    She states that her father had his MRI and would like for the Dr to call her with the results [phone]

## 2019-11-19 ENCOUNTER — TELEPHONE (OUTPATIENT)
Dept: NEUROLOGY | Facility: CLINIC | Age: 84
End: 2019-11-19

## 2019-11-19 NOTE — TELEPHONE ENCOUNTER
BABITA Beebe spoke with the Pt's daughter regarding the appointment time and I will forward message to Dr Norma Choi regarding the results

## 2019-11-20 ENCOUNTER — MED REC SCAN ONLY (OUTPATIENT)
Dept: NEUROLOGY | Facility: CLINIC | Age: 84
End: 2019-11-20

## 2019-11-20 NOTE — TELEPHONE ENCOUNTER
Spoke with daughter Medardo Jones should be bringing her father and tomorrow at noon to discuss MRI results and further potential treatments. Stacy Meier.  Safia Ruano MD, [facility]  Physical Medicine and Rehabilitation/Sports Medicine  [facility]

## 2019-11-20 NOTE — TELEPHONE ENCOUNTER
Spoke to daughter Britt Espinal and confirmed [address] 11/2019 @noon. Pt also asked about appt for 12/17. Will ask Dr. Russel Castrejon if keeping or canceling.

## 2019-11-21 ENCOUNTER — TELEPHONE (OUTPATIENT)
Dept: NEUROLOGY | Facility: CLINIC | Age: 84
End: 2019-11-21

## 2019-11-21 ENCOUNTER — OFFICE VISIT (OUTPATIENT)
Dept: NEUROLOGY | Facility: CLINIC | Age: 84
End: 2019-11-21
Payer: MEDICARE

## 2019-11-21 VITALS
WEIGHT: 197 LBS | HEIGHT: 68 IN | RESPIRATION RATE: 17 BRPM | HEART RATE: 63 BPM | BODY MASS INDEX: 29.86 KG/M2 | DIASTOLIC BLOOD PRESSURE: 68 MMHG | SYSTOLIC BLOOD PRESSURE: 122 MMHG

## 2019-11-21 DIAGNOSIS — M51.16 LUMBAR DISC HERNIATION WITH RADICULOPATHY: ICD-10-CM

## 2019-11-21 DIAGNOSIS — M47.816 FACET SYNDROME, LUMBAR: ICD-10-CM

## 2019-11-21 DIAGNOSIS — M51.26 BULGE OF LUMBAR DISC WITHOUT MYELOPATHY: ICD-10-CM

## 2019-11-21 DIAGNOSIS — M43.16 SPONDYLOLISTHESIS AT L4-L5 LEVEL: Primary | ICD-10-CM

## 2019-11-21 DIAGNOSIS — M48.062 SPINAL STENOSIS OF LUMBAR REGION WITH NEUROGENIC CLAUDICATION: ICD-10-CM

## 2019-11-21 DIAGNOSIS — M54.16 LUMBAR RADICULOPATHY, ACUTE: ICD-10-CM

## 2019-11-21 DIAGNOSIS — M48.061 LUMBAR FORAMINAL STENOSIS: ICD-10-CM

## 2019-11-21 DIAGNOSIS — M51.37 DDD (DEGENERATIVE DISC DISEASE), LUMBOSACRAL: ICD-10-CM

## 2019-11-21 DIAGNOSIS — M47.816 LUMBAR SPONDYLOSIS: ICD-10-CM

## 2019-11-21 PROBLEM — M51.379 DDD (DEGENERATIVE DISC DISEASE), LUMBOSACRAL: Status: ACTIVE | Noted: 2019-11-21

## 2019-11-21 PROBLEM — M51.36 BULGE OF LUMBAR DISC WITHOUT MYELOPATHY: Status: ACTIVE | Noted: 2019-11-21

## 2019-11-21 PROBLEM — M51.369 BULGE OF LUMBAR DISC WITHOUT MYELOPATHY: Status: ACTIVE | Noted: 2019-11-21

## 2019-11-21 PROCEDURE — 99214 OFFICE O/P EST MOD 30 MIN: CPT | Performed by: PHYSICAL MEDICINE & REHABILITATION

## 2019-11-21 RX ORDER — TRAMADOL HYDROCHLORIDE 50 MG/1
50 TABLET ORAL EVERY 6 HOURS PRN
Qty: 30 TABLET | Refills: 1 | Status: SHIPPED | OUTPATIENT
Start: 2019-11-21 | End: 2019-12-14

## 2019-11-21 RX ORDER — GABAPENTIN 100 MG/1
200 CAPSULE ORAL 3 TIMES DAILY
Qty: 180 CAPSULE | Refills: 0 | Status: SHIPPED | OUTPATIENT
Start: 2019-11-21 | End: 2019-12-17

## 2019-11-21 NOTE — PATIENT INSTRUCTIONS
1) My office will call you to schedule the RIGHT L4 and RIGHT S1 TFESI under local anesthesia once the procedure is approved by your insurance carrier. 2) Follow up with me 2-3 weeks after the injection to see how you are feeling.      3) Take gabapent

## 2019-11-21 NOTE — TELEPHONE ENCOUNTER
Patient has been scheduled for RIGHT L4 and RIGHT S1 TFESI on 11/25 at the [facility]. Medications and allergies reviewed. Patient informed to hold aspirins, nsaids, blood thinners, multivitamins, vitamin E and fish oils 3-7 days prior to procedure. Patient infor

## 2019-11-21 NOTE — PROGRESS NOTES
[address]  Progress Note    CHIEF COMPLAINT:  Patient presents with:  Low Back Pain: LOV 11/11/19 Pt states that since starting PT and the Injection states that the pain is worst. Pt is also taking a muscle Alcohol/week: 2.5 standard drinks        Types: 3 Glasses of wine per week      Drug use: No      Sexual activity: Not on file      FAMILY HISTORY:   Family History   Problem Relation Age of Onset   • Heart Disorder Father    • Heart Disorder Mother    • D Pertinent positives and negatives noted in the HPI. PHYSICAL EXAM:   /68   Pulse 63   Resp 17   Ht 68\"   Wt 197 lb (89.4 kg)   BMI 29.95 kg/m²     Body mass index is 29.95 kg/m².       General: No immediate distress  Head: Normocephalic/ Atrau • Estimated Average Glucose 09/11/2019 137* 68 - 126 mg/dL Final   • Free T4 09/11/2019 0.9  0.8 - 1.7 ng/dL Final   [facility] Lab Encounter on 07/16/2019   Component Date Value Ref Range Status   • TSH 07/16/2019 6.850* 0.358 - 3.740 mIU/mL Final   • Free T4 superimposed large central disc extrusion, which measures approximately 1.2 cm. This finding in conjunction with dorsal epidural lipomatosis, ligamentum flavum redundancy, and bilateral facet arthropathy results in severe   spinal canal stenosis.   Minimal to 3 weeks after his injection. I have also started the patient on gabapentin 200 mg 3 times per day as well as tramadol 50 mg every 6 hours as needed for pain.        RTC in 2-3 weeks after his injection  Discharge Instructions were provided as documented

## 2019-11-21 NOTE — TELEPHONE ENCOUNTER
Medicare Online for authorization of procedure RIGHT L4 and RIGHT S1 TFESI CPT Code: 03158,40739- APPROVED. Procedure is a covered benefit and does not require authorization. Will inform Nursing.

## 2019-11-25 ENCOUNTER — OFFICE VISIT (OUTPATIENT)
Dept: SURGERY | Facility: CLINIC | Age: 84
End: 2019-11-25
Payer: MEDICARE

## 2019-11-25 DIAGNOSIS — M47.816 LUMBAR SPONDYLOSIS: Primary | ICD-10-CM

## 2019-11-25 DIAGNOSIS — M54.16 LUMBAR RADICULOPATHY, ACUTE: ICD-10-CM

## 2019-11-25 DIAGNOSIS — M51.16 LUMBAR DISC HERNIATION WITH RADICULOPATHY: ICD-10-CM

## 2019-11-25 DIAGNOSIS — M48.062 SPINAL STENOSIS OF LUMBAR REGION WITH NEUROGENIC CLAUDICATION: ICD-10-CM

## 2019-11-25 PROCEDURE — 64483 NJX AA&/STRD TFRM EPI L/S 1: CPT | Performed by: PHYSICAL MEDICINE & REHABILITATION

## 2019-11-25 PROCEDURE — 64484 NJX AA&/STRD TFRM EPI L/S EA: CPT | Performed by: PHYSICAL MEDICINE & REHABILITATION

## 2019-11-25 NOTE — PROCEDURES
[address]    2-LEVEL LUMBAR TRANSFORAMINAL   NAME:  Lizeth Pereyra    MR #:    [id number] :  1935     PHYSICIAN:  Lc Sears        Operative Report    DATE OF PROCEDURE: 2019   PREOPERATIVE DIAGNOSES: 1.  Lumbar sp injected with a 4 cc solution of 2 cc of 6 mg/cc of Betamethasone and 2 cc of 1% PF lidocaine without epinephrine at each site. After this, the needles were removed. Each site was cleaned. Band-Aids were applied.   The patient was transferred to the cart

## 2019-12-02 ENCOUNTER — TELEPHONE (OUTPATIENT)
Dept: NEUROLOGY | Facility: CLINIC | Age: 84
End: 2019-12-02

## 2019-12-02 RX ORDER — GLIMEPIRIDE 2 MG/1
2 TABLET ORAL
Qty: 90 TABLET | Refills: 1 | Status: SHIPPED | OUTPATIENT
Start: 2019-12-02 | End: 2020-05-26

## 2019-12-03 ENCOUNTER — APPOINTMENT (OUTPATIENT)
Dept: PHYSICAL THERAPY | Age: 84
End: 2019-12-03
Attending: PHYSICAL MEDICINE & REHABILITATION
Payer: MEDICARE

## 2019-12-03 NOTE — TELEPHONE ENCOUNTER
Pts daughter Jorden Cobb called wanting to know if Dr Lexi Hanks can do decompressions? She would like Dr Lexi Hanks to call her back, thanks.   [phone]

## 2019-12-03 NOTE — TELEPHONE ENCOUNTER
Spoke to [place] -daughter and offered her dad an appt for Dec 4th @ 9:20 am ( Dr Jeff Reddy had a cancellation on his schedule).

## 2019-12-04 ENCOUNTER — TELEPHONE (OUTPATIENT)
Dept: NEUROLOGY | Facility: CLINIC | Age: 84
End: 2019-12-04

## 2019-12-04 ENCOUNTER — OFFICE VISIT (OUTPATIENT)
Dept: NEUROLOGY | Facility: CLINIC | Age: 84
End: 2019-12-04
Payer: MEDICARE

## 2019-12-04 VITALS — BODY MASS INDEX: 29.86 KG/M2 | WEIGHT: 197 LBS | HEART RATE: 74 BPM | RESPIRATION RATE: 17 BRPM | HEIGHT: 68 IN

## 2019-12-04 DIAGNOSIS — M43.16 SPONDYLOLISTHESIS AT L4-L5 LEVEL: Primary | ICD-10-CM

## 2019-12-04 DIAGNOSIS — M51.37 DDD (DEGENERATIVE DISC DISEASE), LUMBOSACRAL: ICD-10-CM

## 2019-12-04 DIAGNOSIS — M48.062 SPINAL STENOSIS OF LUMBAR REGION WITH NEUROGENIC CLAUDICATION: ICD-10-CM

## 2019-12-04 DIAGNOSIS — M51.16 LUMBAR DISC HERNIATION WITH RADICULOPATHY: ICD-10-CM

## 2019-12-04 DIAGNOSIS — M47.816 FACET SYNDROME, LUMBAR: ICD-10-CM

## 2019-12-04 DIAGNOSIS — M48.061 LUMBAR FORAMINAL STENOSIS: ICD-10-CM

## 2019-12-04 DIAGNOSIS — M54.16 LUMBAR RADICULOPATHY, ACUTE: ICD-10-CM

## 2019-12-04 DIAGNOSIS — M54.59 LUMBAR TRIGGER POINT SYNDROME: ICD-10-CM

## 2019-12-04 PROCEDURE — 99214 OFFICE O/P EST MOD 30 MIN: CPT | Performed by: PHYSICAL MEDICINE & REHABILITATION

## 2019-12-04 NOTE — PATIENT INSTRUCTIONS
1) Make an appointment with Vivian Branham (Neurosurgery)  2) My office will call you to schedule the BILATERAL L4 TFESI under local anesthesia once the procedure is approved by your insurance carrier.     3) Follow up with me 2 weeks after the injection  4) In

## 2019-12-04 NOTE — PROGRESS NOTES
[address]  Progress Note    CHIEF COMPLAINT:  Patient presents with:  Low Back Pain: LOV 11/25/19 Pt states that since the injection he has more pain in the hip area. History of Present Illness:   The 2.5 standard drinks        Types: 3 Glasses of wine per week      Drug use: No      Sexual activity: Not on file      FAMILY HISTORY:   Family History   Problem Relation Age of Onset   • Heart Disorder Father    • Heart Disorder Mother    • Diabetes Brothe positives and negatives noted in the HPI. PHYSICAL EXAM:   Pulse 74   Resp 17   Ht 68\"   Wt 197 lb (89.4 kg)   BMI 29.95 kg/m²     Body mass index is 29.95 kg/m².       General: No immediate distress  Head: Normocephalic/ Atraumatic  Eyes: Extra-occ ]      Radiology Imaging:  I reviewed with the patient his MRI of the lumbar spine from 11/14/2019  MRI SPINE LUMBAR (CPT=72148)  Narrative: PROCEDURE: MRI SPINE LUMBAR (CPT=72148)     COMPARISON: [facility] [place], XR LUMBAR SPINE AP LAT FLEX EX No left neural foraminal stenosis. L5-S1: Diffuse disc bulge with a small superimposed right subarticular zone disc protrusion, which results in mild right lateral recess stenosis with effacement of the traversing right S1 nerve root.   Minor bilateral ibeth tramadol and Flexeril as prescribed. I will follow-up with Odilon Liang 2 weeks after his injection. This was discussed with the patient as well as his son.        RTC in 2 weeks after the injection  Discharge Instructions were provided as documented in AVS s

## 2019-12-04 NOTE — TELEPHONE ENCOUNTER
Medicare Online for insurance coverage of BILATERAL L4 TFESIs cpt codes [id number], [id number]. Insurance was verified and procedure  is a covered benefit and does not require authorization. Will inform   Nursing.

## 2019-12-05 ENCOUNTER — APPOINTMENT (OUTPATIENT)
Dept: PHYSICAL THERAPY | Age: 84
End: 2019-12-05
Attending: PHYSICAL MEDICINE & REHABILITATION
Payer: MEDICARE

## 2019-12-05 NOTE — TELEPHONE ENCOUNTER
Called to schedule an appoointment for the patient.  Spoke to the daughter Brady Yeh and she states that she will call back to inform us if the Patient will have a ride to his injection

## 2019-12-09 ENCOUNTER — OFFICE VISIT (OUTPATIENT)
Dept: SURGERY | Facility: CLINIC | Age: 84
End: 2019-12-09
Payer: MEDICARE

## 2019-12-09 ENCOUNTER — OFFICE VISIT (OUTPATIENT)
Dept: INTERNAL MEDICINE CLINIC | Facility: CLINIC | Age: 84
End: 2019-12-09
Payer: MEDICARE

## 2019-12-09 VITALS
DIASTOLIC BLOOD PRESSURE: 84 MMHG | HEIGHT: 68 IN | BODY MASS INDEX: 28.95 KG/M2 | HEART RATE: 76 BPM | RESPIRATION RATE: 16 BRPM | SYSTOLIC BLOOD PRESSURE: 137 MMHG | WEIGHT: 191 LBS

## 2019-12-09 DIAGNOSIS — F17.200 TOBACCO USE DISORDER: ICD-10-CM

## 2019-12-09 DIAGNOSIS — M48.062 SPINAL STENOSIS OF LUMBAR REGION WITH NEUROGENIC CLAUDICATION: ICD-10-CM

## 2019-12-09 DIAGNOSIS — M47.816 LUMBAR SPONDYLOSIS: Primary | ICD-10-CM

## 2019-12-09 DIAGNOSIS — E78.2 MIXED HYPERLIPIDEMIA: ICD-10-CM

## 2019-12-09 DIAGNOSIS — M54.41 ACUTE RIGHT-SIDED LOW BACK PAIN WITH RIGHT-SIDED SCIATICA: Primary | ICD-10-CM

## 2019-12-09 DIAGNOSIS — E11.9 TYPE 2 DIABETES MELLITUS WITHOUT COMPLICATION, WITHOUT LONG-TERM CURRENT USE OF INSULIN (HCC): ICD-10-CM

## 2019-12-09 DIAGNOSIS — I10 ESSENTIAL HYPERTENSION WITH GOAL BLOOD PRESSURE LESS THAN 130/85: ICD-10-CM

## 2019-12-09 DIAGNOSIS — M54.16 LUMBAR RADICULOPATHY, ACUTE: ICD-10-CM

## 2019-12-09 DIAGNOSIS — M51.16 LUMBAR DISC HERNIATION WITH RADICULOPATHY: ICD-10-CM

## 2019-12-09 DIAGNOSIS — Z23 NEED FOR VACCINATION: ICD-10-CM

## 2019-12-09 PROCEDURE — 99214 OFFICE O/P EST MOD 30 MIN: CPT | Performed by: INTERNAL MEDICINE

## 2019-12-09 PROCEDURE — 99406 BEHAV CHNG SMOKING 3-10 MIN: CPT | Performed by: INTERNAL MEDICINE

## 2019-12-09 PROCEDURE — G0009 ADMIN PNEUMOCOCCAL VACCINE: HCPCS | Performed by: INTERNAL MEDICINE

## 2019-12-09 PROCEDURE — G0463 HOSPITAL OUTPT CLINIC VISIT: HCPCS | Performed by: INTERNAL MEDICINE

## 2019-12-09 PROCEDURE — 64483 NJX AA&/STRD TFRM EPI L/S 1: CPT | Performed by: PHYSICAL MEDICINE & REHABILITATION

## 2019-12-09 PROCEDURE — 90732 PPSV23 VACC 2 YRS+ SUBQ/IM: CPT | Performed by: INTERNAL MEDICINE

## 2019-12-09 NOTE — PROGRESS NOTES
HPI:   1. Chronic right-sided low back pain with right-sided sciatica    Has been having some pain in the right low back that started radiating to the right leg. Aches when juan walks after he drives or sits too long.  Had a steroid injection 2 weeks ago per GLYCOHEMOGLOBIN (HgA1c) (L) (%)   Date Value   05/13/2016 7.0 (H)   12/23/2015 6.6 (H)   05/08/2015 7.0 (H)     Glycohemoglobin (HgA1c) (%)   Date Value   09/20/2018 6.1 (H)   03/30/2018 7.8 (H)   10/16/2017 7.3 (H)     Cholesterol, Total (mg/dL)   Roland 10 MG Oral Tab TAKE 1 TABLET (10 MG TOTAL) BY MOUTH DAILY.  90 tablet 1   • Levothyroxine Sodium (SYNTHROID) 25 MCG Oral Tab Take 1 tablet (25 mcg total) by mouth before breakfast. 90 tablet 3   • SIMVASTATIN 20 MG Oral Tab TAKE 1 TABLET (20 MG TOTAL) BY MO tenderness  EXTREMITIES: no cyanosis, clubbing or edema   NEURO: sensation is intact to monofilament   DIABETIC FOOT EXAM: Good pulse bilaterally Feet warm. Pin prick and light touch intact. No obvious deformities seen. ASSESSMENT AND PLAN:   1.  Acute r as prescribed and to continue to follow a low fat, low cholesterol diet as discussed. Try to exercise at least 3 times weekly to build strength, burn calories and help to achieve ideal body weight.     5. Tobacco use disorder    Patient strongly encouraged Jenifer Hall in achieving agreed-upon goals by acquiring the skills, confidence, and social/environmental supports for behavior change, supplemented with adjunctive medical treatments when appropriate.  Additionally, national quitting tobacco aides were discuss

## 2019-12-09 NOTE — PROCEDURES
[address]    BILATERAL LUMBAR TRANSFORAMINAL   NAME:  Sarah Beth Monday    MR #:    [id number] :  1935     PHYSICIAN:  Rosy Snow        Operative Report    DATE OF PROCEDURE: 2019   PREOPERATIVE DIAGNOSES: 1.   Lumbar patient's skin was cleaned. A Band-Aid was applied. The patient was transferred to the cart and into [facility]. The patient was given discharge instructions and will follow up in the clinic as scheduled.   Throughout the whole procedure, the patient's pulse ox

## 2019-12-10 ENCOUNTER — APPOINTMENT (OUTPATIENT)
Dept: PHYSICAL THERAPY | Age: 84
End: 2019-12-10
Attending: PHYSICAL MEDICINE & REHABILITATION
Payer: MEDICARE

## 2019-12-12 ENCOUNTER — APPOINTMENT (OUTPATIENT)
Dept: PHYSICAL THERAPY | Age: 84
End: 2019-12-12
Attending: PHYSICAL MEDICINE & REHABILITATION
Payer: MEDICARE

## 2019-12-13 ENCOUNTER — TELEPHONE (OUTPATIENT)
Dept: NEUROLOGY | Facility: CLINIC | Age: 84
End: 2019-12-13

## 2019-12-14 RX ORDER — TRAMADOL HYDROCHLORIDE 50 MG/1
50 TABLET ORAL EVERY 6 HOURS PRN
Qty: 30 TABLET | Refills: 1 | Status: SHIPPED | OUTPATIENT
Start: 2019-12-14 | End: 2021-07-22

## 2019-12-14 NOTE — TELEPHONE ENCOUNTER
The patient's daughter called and stated that he was out of his Ultram and that he called yesterday for a refill. I see in the system that [facility] sent over a quest, but it does not look like it was answered.   I e-Rx'd a refill for 30 tablets of the Ultram.

## 2019-12-16 ENCOUNTER — APPOINTMENT (OUTPATIENT)
Dept: PHYSICAL THERAPY | Age: 84
End: 2019-12-16
Attending: PHYSICAL MEDICINE & REHABILITATION
Payer: MEDICARE

## 2019-12-17 ENCOUNTER — OFFICE VISIT (OUTPATIENT)
Dept: NEUROLOGY | Facility: CLINIC | Age: 84
End: 2019-12-17
Payer: MEDICARE

## 2019-12-17 VITALS
DIASTOLIC BLOOD PRESSURE: 68 MMHG | HEIGHT: 68 IN | WEIGHT: 191 LBS | BODY MASS INDEX: 28.95 KG/M2 | SYSTOLIC BLOOD PRESSURE: 134 MMHG | RESPIRATION RATE: 17 BRPM

## 2019-12-17 DIAGNOSIS — M48.062 SPINAL STENOSIS OF LUMBAR REGION WITH NEUROGENIC CLAUDICATION: ICD-10-CM

## 2019-12-17 DIAGNOSIS — E11.9 TYPE 2 DIABETES MELLITUS WITHOUT COMPLICATION, WITHOUT LONG-TERM CURRENT USE OF INSULIN (HCC): ICD-10-CM

## 2019-12-17 DIAGNOSIS — M47.816 FACET SYNDROME, LUMBAR: ICD-10-CM

## 2019-12-17 DIAGNOSIS — M43.16 SPONDYLOLISTHESIS AT L4-L5 LEVEL: Primary | ICD-10-CM

## 2019-12-17 DIAGNOSIS — M54.59 LUMBAR TRIGGER POINT SYNDROME: ICD-10-CM

## 2019-12-17 DIAGNOSIS — M48.061 LUMBAR FORAMINAL STENOSIS: ICD-10-CM

## 2019-12-17 DIAGNOSIS — E78.2 MIXED HYPERLIPIDEMIA: ICD-10-CM

## 2019-12-17 DIAGNOSIS — M51.26 BULGE OF LUMBAR DISC WITHOUT MYELOPATHY: ICD-10-CM

## 2019-12-17 DIAGNOSIS — I10 ESSENTIAL HYPERTENSION WITH GOAL BLOOD PRESSURE LESS THAN 130/85: ICD-10-CM

## 2019-12-17 DIAGNOSIS — M70.62 GREATER TROCHANTERIC BURSITIS OF BOTH HIPS: ICD-10-CM

## 2019-12-17 DIAGNOSIS — M70.61 GREATER TROCHANTERIC BURSITIS OF BOTH HIPS: ICD-10-CM

## 2019-12-17 DIAGNOSIS — M51.16 LUMBAR DISC HERNIATION WITH RADICULOPATHY: ICD-10-CM

## 2019-12-17 DIAGNOSIS — M54.16 LUMBAR RADICULOPATHY, ACUTE: ICD-10-CM

## 2019-12-17 DIAGNOSIS — M47.816 LUMBAR SPONDYLOSIS: ICD-10-CM

## 2019-12-17 DIAGNOSIS — M51.37 DDD (DEGENERATIVE DISC DISEASE), LUMBOSACRAL: ICD-10-CM

## 2019-12-17 PROCEDURE — 99214 OFFICE O/P EST MOD 30 MIN: CPT | Performed by: PHYSICAL MEDICINE & REHABILITATION

## 2019-12-17 RX ORDER — SIMVASTATIN 20 MG
20 TABLET ORAL NIGHTLY
Qty: 90 TABLET | Refills: 1 | Status: SHIPPED | OUTPATIENT
Start: 2019-12-17 | End: 2020-06-08

## 2019-12-17 RX ORDER — CYCLOBENZAPRINE HCL 5 MG
TABLET ORAL
Qty: 90 TABLET | Refills: 0 | Status: SHIPPED | OUTPATIENT
Start: 2019-12-17 | End: 2020-06-18

## 2019-12-17 RX ORDER — GABAPENTIN 300 MG/1
300 CAPSULE ORAL 3 TIMES DAILY
Qty: 90 CAPSULE | Refills: 2 | Status: SHIPPED | OUTPATIENT
Start: 2019-12-17

## 2019-12-17 NOTE — PROGRESS NOTES
[address]  Progress Note    CHIEF COMPLAINT:  Patient presents with:  Low Back Pain: LOV 12/09/19 Pt states that afte the injection nothing has changed and he still has the same pain ,       History of Pres tobacco: Never Used    Substance and Sexual Activity      Alcohol use:  Yes        Alcohol/week: 2.5 standard drinks        Types: 3 Glasses of wine per week      Drug use: No      Sexual activity: Not on file      FAMILY HISTORY:   Family History   Problem Psychiatric/Behavioral: Negative. All other systems reviewed and are negative. Pertinent positives and negatives noted in the HPI.         PHYSICAL EXAM:   /68   Resp 17   Ht 68\"   Wt 191 lb (86.6 kg)   BMI 29.04 kg/m²     Body mass index is 2 preservation of the expected lumbar lordosis. BONES: No fracture or suspicious osseous lesion is evident.   Small degenerative inferior endplate Schmorl's nodes at L1 and L3.  CORD/CAUDA EQUINA: The distal cord and nerve roots have normal caliber, contour, recess stenosis related to a right subarticular zone disc protrusion with slight effacement of the traversing right S1 nerve root. Minor bilateral neural foraminal stenosis. 4. L3-L4:  Mild bilateral neural foraminal stenosis.   5. L1-L2:  Degenerative ch with radiculopathy  Bulge of lumbar disc without myelopathy  Type 2 diabetes mellitus without complication, without long-term current use of insulin ([address]  Mixed hyperlipidemia  Essential hypertension with goal blood pressure less than 130/85    Mik B. Be

## 2019-12-17 NOTE — TELEPHONE ENCOUNTER
Refill passed per [facility] protocol.   Cholesterol Medications  Protocol Criteria:  · Appointment scheduled in the past 12 months or in the next 3 months  · ALT & LDL on file in the past 12 months  · ALT result < 80  · LDL result <130   Recent Outpat

## 2019-12-19 ENCOUNTER — APPOINTMENT (OUTPATIENT)
Dept: PHYSICAL THERAPY | Age: 84
End: 2019-12-19
Attending: PHYSICAL MEDICINE & REHABILITATION
Payer: MEDICARE

## 2019-12-27 RX ORDER — LANCETS 28 GAUGE
EACH MISCELLANEOUS
Qty: 100 EACH | Refills: 3 | Status: SHIPPED | OUTPATIENT
Start: 2019-12-27 | End: 2019-12-31

## 2019-12-27 RX ORDER — BLOOD-GLUCOSE METER
KIT MISCELLANEOUS
Qty: 100 STRIP | Refills: 1 | Status: SHIPPED | OUTPATIENT
Start: 2019-12-27 | End: 2019-12-31

## 2019-12-27 RX ORDER — LANCETS 28 GAUGE
EACH MISCELLANEOUS
Qty: 100 EACH | Refills: 5 | Status: SHIPPED | OUTPATIENT
Start: 2019-12-27 | End: 2019-12-31

## 2019-12-27 NOTE — TELEPHONE ENCOUNTER
Refill passed per [facility] protocol.   Diabetic Supplies  Protocol Criteria:  · Appointment scheduled in past 12 months or the next 3 months

## 2019-12-27 NOTE — TELEPHONE ENCOUNTER
Patient daughter requesting refill for supplies below and states out of supplies and pharmacy advised sent request over to office.     •  FREESTYLE LANCETS Does not apply Misc, Test 1 xs daily, Disp: 100 each, Rfl: 5  •  Glucose Blood (FREESTYLE LITE TEST)

## 2019-12-31 ENCOUNTER — TELEPHONE (OUTPATIENT)
Dept: CARDIOLOGY CLINIC | Facility: CLINIC | Age: 84
End: 2019-12-31

## 2019-12-31 ENCOUNTER — APPOINTMENT (OUTPATIENT)
Dept: PHYSICAL THERAPY | Age: 84
End: 2019-12-31
Attending: PHYSICAL MEDICINE & REHABILITATION
Payer: MEDICARE

## 2019-12-31 RX ORDER — LANCETS 28 GAUGE
EACH MISCELLANEOUS
Qty: 100 EACH | Refills: 3 | Status: SHIPPED | OUTPATIENT
Start: 2019-12-31 | End: 2021-01-25

## 2019-12-31 RX ORDER — LANCETS 28 GAUGE
EACH MISCELLANEOUS
Qty: 100 EACH | Refills: 5 | Status: SHIPPED | OUTPATIENT
Start: 2019-12-31 | End: 2021-07-22

## 2019-12-31 RX ORDER — BLOOD-GLUCOSE METER
KIT MISCELLANEOUS
Qty: 100 STRIP | Refills: 1 | Status: SHIPPED | OUTPATIENT
Start: 2019-12-31 | End: 2020-06-22

## 2019-12-31 NOTE — TELEPHONE ENCOUNTER
Winifred Quarles asking for clarification:    Missing/illegible information on Rx - further clarification to be provided ICD - 10 code. This is an old version of diagnosis code. Re:  Resstyle lancets does not apply misc.   Qty 100 each  SIB: Test 1 xs daily  LEFTY 0

## 2019-12-31 NOTE — TELEPHONE ENCOUNTER
I called [facility] in [place] and I was informed that thy do not have pt on file. I noted the the DM supplies had been sent to [facility] in [place] so I called them.   I have resend the scripts with Dx e11.9 non insulin to [facility] as this is the pharmacy that is requ

## 2020-01-02 ENCOUNTER — APPOINTMENT (OUTPATIENT)
Dept: PHYSICAL THERAPY | Age: 85
End: 2020-01-02
Attending: PHYSICAL MEDICINE & REHABILITATION
Payer: MEDICARE

## 2020-01-13 ENCOUNTER — OFFICE VISIT (OUTPATIENT)
Dept: INTERNAL MEDICINE CLINIC | Facility: CLINIC | Age: 85
End: 2020-01-13
Payer: MEDICARE

## 2020-01-13 ENCOUNTER — APPOINTMENT (OUTPATIENT)
Dept: LAB | Facility: HOSPITAL | Age: 85
End: 2020-01-13
Attending: INTERNAL MEDICINE
Payer: MEDICARE

## 2020-01-13 ENCOUNTER — HOSPITAL ENCOUNTER (OUTPATIENT)
Dept: GENERAL RADIOLOGY | Facility: HOSPITAL | Age: 85
Discharge: HOME | End: 2020-01-13
Attending: INTERNAL MEDICINE
Payer: MEDICARE

## 2020-01-13 VITALS
BODY MASS INDEX: 30.31 KG/M2 | SYSTOLIC BLOOD PRESSURE: 150 MMHG | RESPIRATION RATE: 16 BRPM | WEIGHT: 200 LBS | DIASTOLIC BLOOD PRESSURE: 80 MMHG | HEIGHT: 68 IN | HEART RATE: 66 BPM

## 2020-01-13 DIAGNOSIS — T14.8XXA BRUISING: ICD-10-CM

## 2020-01-13 DIAGNOSIS — Z01.818 PRE-OPERATIVE EXAMINATION: Primary | ICD-10-CM

## 2020-01-13 DIAGNOSIS — Z01.818 PRE-OPERATIVE EXAMINATION: ICD-10-CM

## 2020-01-13 LAB
ALBUMIN SERPL-MCNC: 4 G/DL (ref 3.4–5)
ALBUMIN/GLOB SERPL: 1.3 {RATIO} (ref 1–2)
ALP LIVER SERPL-CCNC: 93 U/L (ref 45–117)
ALT SERPL-CCNC: 37 U/L (ref 16–61)
ANION GAP SERPL CALC-SCNC: 5 MMOL/L (ref 0–18)
APTT PPP: 28.6 SECONDS (ref 23.2–35.3)
AST SERPL-CCNC: 20 U/L (ref 15–37)
BASOPHILS # BLD AUTO: 0.06 X10(3) UL (ref 0–0.2)
BASOPHILS NFR BLD AUTO: 1 %
BILIRUB SERPL-MCNC: 0.3 MG/DL (ref 0.1–2)
BUN BLD-MCNC: 22 MG/DL (ref 7–18)
BUN/CREAT SERPL: 19 (ref 10–20)
CALCIUM BLD-MCNC: 9.9 MG/DL (ref 8.5–10.1)
CHLORIDE SERPL-SCNC: 107 MMOL/L (ref 98–112)
CO2 SERPL-SCNC: 28 MMOL/L (ref 21–32)
CREAT BLD-MCNC: 1.16 MG/DL (ref 0.7–1.3)
DEPRECATED RDW RBC AUTO: 45.3 FL (ref 35.1–46.3)
EOSINOPHIL # BLD AUTO: 0.12 X10(3) UL (ref 0–0.7)
EOSINOPHIL NFR BLD AUTO: 2 %
ERYTHROCYTE [DISTWIDTH] IN BLOOD BY AUTOMATED COUNT: 14.4 % (ref 11–15)
GLOBULIN PLAS-MCNC: 3 G/DL (ref 2.8–4.4)
GLUCOSE BLD-MCNC: 108 MG/DL (ref 70–99)
HCT VFR BLD AUTO: 36.7 % (ref 39–53)
HGB BLD-MCNC: 11.9 G/DL (ref 13–17.5)
IMM GRANULOCYTES # BLD AUTO: 0.1 X10(3) UL (ref 0–1)
IMM GRANULOCYTES NFR BLD: 1.7 %
INR BLD: 1.01 (ref 0.9–1.2)
LYMPHOCYTES # BLD AUTO: 1.77 X10(3) UL (ref 1–4)
LYMPHOCYTES NFR BLD AUTO: 29.8 %
M PROTEIN MFR SERPL ELPH: 7 G/DL (ref 6.4–8.2)
MCH RBC QN AUTO: 28.3 PG (ref 26–34)
MCHC RBC AUTO-ENTMCNC: 32.4 G/DL (ref 31–37)
MCV RBC AUTO: 87.2 FL (ref 80–100)
MONOCYTES # BLD AUTO: 0.49 X10(3) UL (ref 0.1–1)
MONOCYTES NFR BLD AUTO: 8.3 %
NEUTROPHILS # BLD AUTO: 3.39 X10 (3) UL (ref 1.5–7.7)
NEUTROPHILS # BLD AUTO: 3.39 X10(3) UL (ref 1.5–7.7)
NEUTROPHILS NFR BLD AUTO: 57.2 %
OSMOLALITY SERPL CALC.SUM OF ELEC: 294 MOSM/KG (ref 275–295)
PATIENT FASTING Y/N/NP: YES
PLATELET # BLD AUTO: 202 10(3)UL (ref 150–450)
POTASSIUM SERPL-SCNC: 5 MMOL/L (ref 3.5–5.1)
PROTHROMBIN TIME: 13.1 SECONDS (ref 11.8–14.5)
RBC # BLD AUTO: 4.21 X10(6)UL (ref 3.8–5.8)
SODIUM SERPL-SCNC: 140 MMOL/L (ref 136–145)
WBC # BLD AUTO: 5.9 X10(3) UL (ref 4–11)

## 2020-01-13 PROCEDURE — 93005 ELECTROCARDIOGRAM TRACING: CPT

## 2020-01-13 PROCEDURE — 36415 COLL VENOUS BLD VENIPUNCTURE: CPT

## 2020-01-13 PROCEDURE — 93010 ELECTROCARDIOGRAM REPORT: CPT | Performed by: INTERNAL MEDICINE

## 2020-01-13 PROCEDURE — 87081 CULTURE SCREEN ONLY: CPT

## 2020-01-13 PROCEDURE — 80053 COMPREHEN METABOLIC PANEL: CPT

## 2020-01-13 PROCEDURE — 85730 THROMBOPLASTIN TIME PARTIAL: CPT

## 2020-01-13 PROCEDURE — G0463 HOSPITAL OUTPT CLINIC VISIT: HCPCS | Performed by: INTERNAL MEDICINE

## 2020-01-13 PROCEDURE — 99214 OFFICE O/P EST MOD 30 MIN: CPT | Performed by: INTERNAL MEDICINE

## 2020-01-13 PROCEDURE — 85025 COMPLETE CBC W/AUTO DIFF WBC: CPT

## 2020-01-13 PROCEDURE — 71046 X-RAY EXAM CHEST 2 VIEWS: CPT | Performed by: INTERNAL MEDICINE

## 2020-01-13 PROCEDURE — 85610 PROTHROMBIN TIME: CPT

## 2020-01-13 NOTE — PROGRESS NOTES
Marium Navas is a 82year old male who presents for a pre-operative physical exam. Patient is to have RL4-5 Invasive laminectomy and Microdiscectomy, to be done by Dr. Myesha Montiel at [facility] on 2/20/2020      HPI:   Pt complains of back and leg pain.     Current HISTORY Right       Family History   Problem Relation Age of Onset   • Heart Disorder Father    • Heart Disorder Mother    • Diabetes Brother    • Lipids Other    • Heart Disease Other       Social History:   Social History    Tobacco Use      Smoking stat negative  MUSCULOSKELETAL: back is not tender,FROM of the back  EXTREMITIES: no cyanosis, clubbing or edema  NEURO: Oriented times three,cranial nerves are intact,motor and sensory are grossly intact    ASSESSMENT AND PLAN:   1.  Pre-operative examination

## 2020-01-14 ENCOUNTER — TELEPHONE (OUTPATIENT)
Dept: INTERNAL MEDICINE CLINIC | Facility: CLINIC | Age: 85
End: 2020-01-14

## 2020-01-14 NOTE — TELEPHONE ENCOUNTER
Chest CT is scheduled for 1/16/20 8:30 am.     Consult appt made for 1/17/20 10:10 am in [address] [place] [address]. CSS, please inform pt of time date and location of appt when pt returns call.

## 2020-01-14 NOTE — TELEPHONE ENCOUNTER
Okay to double book with me this Friday ideally during the first half of the schedule. I see that a CT chest was ordered. If patient is able to have CT chest done prior to appointment that would be great.   Otherwise I would remind him to schedule CT as s

## 2020-01-14 NOTE — TELEPHONE ENCOUNTER
Pulmonary RN: Can you please assist patient in obtaining a sooner appointment regarding his recent chest x-ray result (see Dr. Beaulieu Bolds comments). Patient obtaining preoperative clearance for a surgery scheduled next week.  Patient flexible as to the date

## 2020-01-16 ENCOUNTER — HOSPITAL ENCOUNTER (OUTPATIENT)
Dept: CT IMAGING | Facility: HOSPITAL | Age: 85
Discharge: HOME | End: 2020-01-16
Attending: PHYSICIAN ASSISTANT
Payer: MEDICARE

## 2020-01-16 DIAGNOSIS — F17.200 TOBACCO USE DISORDER: ICD-10-CM

## 2020-01-16 DIAGNOSIS — R91.1 LUNG NODULE SEEN ON IMAGING STUDY: ICD-10-CM

## 2020-01-16 PROCEDURE — 71260 CT THORAX DX C+: CPT | Performed by: PHYSICIAN ASSISTANT

## 2020-01-17 ENCOUNTER — OFFICE VISIT (OUTPATIENT)
Dept: PULMONOLOGY | Facility: CLINIC | Age: 85
End: 2020-01-17
Payer: MEDICARE

## 2020-01-17 VITALS
WEIGHT: 200 LBS | BODY MASS INDEX: 30.31 KG/M2 | HEART RATE: 71 BPM | SYSTOLIC BLOOD PRESSURE: 136 MMHG | HEIGHT: 68 IN | OXYGEN SATURATION: 98 % | DIASTOLIC BLOOD PRESSURE: 79 MMHG | RESPIRATION RATE: 18 BRPM

## 2020-01-17 DIAGNOSIS — R91.1 LUNG NODULE: Primary | ICD-10-CM

## 2020-01-17 PROCEDURE — 99204 OFFICE O/P NEW MOD 45 MIN: CPT | Performed by: INTERNAL MEDICINE

## 2020-01-17 PROCEDURE — G0463 HOSPITAL OUTPT CLINIC VISIT: HCPCS | Performed by: INTERNAL MEDICINE

## 2020-01-17 NOTE — H&P
Referring Physician  Leonce Seip, MD    Chief Complaint  Abnormal chest x-ray, preoperative clearance    History of Present Illness  Patient is a 47-year-old male who presents today for preoperative clearance for upcoming back surgery scheduled nex 1-2 tablets three times per day as needed for spasms. Do not operate heavy machinery while on this medication as it may make you sleepy, Disp: 90 tablet, Rfl: 0  gabapentin 300 MG Oral Cap, Take 1 capsule (300 mg total) by mouth 3 (three) times daily. , Dis cessation. Patient denies any significant dyspnea but if he does experience worsening dyspnea symptoms recommend follow-up including possible pulmonary function testing in near future. Patient cleared from pulmonary perspective for upcoming back surgery.

## 2020-01-20 ENCOUNTER — TELEPHONE (OUTPATIENT)
Dept: NEUROLOGY | Facility: CLINIC | Age: 85
End: 2020-01-20

## 2020-01-21 ENCOUNTER — MED REC SCAN ONLY (OUTPATIENT)
Dept: INTERNAL MEDICINE CLINIC | Facility: CLINIC | Age: 85
End: 2020-01-21

## 2020-01-22 ENCOUNTER — MED REC SCAN ONLY (OUTPATIENT)
Dept: NEUROLOGY | Facility: CLINIC | Age: 85
End: 2020-01-22

## 2020-02-07 ENCOUNTER — TELEPHONE (OUTPATIENT)
Dept: NEUROLOGY | Facility: CLINIC | Age: 85
End: 2020-02-07

## 2020-02-07 RX ORDER — CYCLOBENZAPRINE HCL 5 MG
TABLET ORAL
Qty: 90 TABLET | Refills: 0 | OUTPATIENT
Start: 2020-02-07

## 2020-02-07 NOTE — TELEPHONE ENCOUNTER
Medication request: Cyclobenzaprine 5mg take 1-2 tablets three times per day as needed for spasms.      LOV 12/17/19  NOV none    RX denied patient was t follow up with neurosurgeon and follow up as needed with Dr. Fidel Souza

## 2020-03-04 RX ORDER — LISINOPRIL 10 MG/1
10 TABLET ORAL DAILY
Qty: 90 TABLET | Refills: 1 | Status: SHIPPED | OUTPATIENT
Start: 2020-03-04 | End: 2020-08-31

## 2020-03-04 NOTE — TELEPHONE ENCOUNTER
Hypertensive Medications. REFILLED PER PROTOCOL.     Protocol Criteria:  · Appointment scheduled in the past 6 months or in the next 3 months  · BMP or CMP in the past 12 months  · Creatinine result < 2  Recent Outpatient Visits            1 month ago Lung

## 2020-03-06 ENCOUNTER — TELEPHONE (OUTPATIENT)
Dept: INTERNAL MEDICINE CLINIC | Facility: CLINIC | Age: 85
End: 2020-03-06

## 2020-03-06 DIAGNOSIS — I10 ESSENTIAL HYPERTENSION WITH GOAL BLOOD PRESSURE LESS THAN 130/85: ICD-10-CM

## 2020-03-06 DIAGNOSIS — E03.9 ACQUIRED HYPOTHYROIDISM: ICD-10-CM

## 2020-03-06 DIAGNOSIS — E78.2 MIXED HYPERLIPIDEMIA: Primary | ICD-10-CM

## 2020-03-06 DIAGNOSIS — E11.9 TYPE 2 DIABETES MELLITUS WITHOUT COMPLICATION, WITHOUT LONG-TERM CURRENT USE OF INSULIN (HCC): ICD-10-CM

## 2020-03-06 NOTE — TELEPHONE ENCOUNTER
Patient's daughter is calling and would like to know if her father needs to get blood work done before his appointment with Dr. Ni Harrison on 03/16/2020

## 2020-03-12 ENCOUNTER — MED REC SCAN ONLY (OUTPATIENT)
Dept: INTERNAL MEDICINE CLINIC | Facility: CLINIC | Age: 85
End: 2020-03-12

## 2020-03-16 ENCOUNTER — OFFICE VISIT (OUTPATIENT)
Dept: INTERNAL MEDICINE CLINIC | Facility: CLINIC | Age: 85
End: 2020-03-16
Payer: MEDICARE

## 2020-03-16 VITALS
HEIGHT: 68 IN | BODY MASS INDEX: 29.86 KG/M2 | WEIGHT: 197 LBS | DIASTOLIC BLOOD PRESSURE: 71 MMHG | SYSTOLIC BLOOD PRESSURE: 133 MMHG

## 2020-03-16 DIAGNOSIS — I10 ESSENTIAL HYPERTENSION WITH GOAL BLOOD PRESSURE LESS THAN 130/85: ICD-10-CM

## 2020-03-16 DIAGNOSIS — E78.2 MIXED HYPERLIPIDEMIA: ICD-10-CM

## 2020-03-16 DIAGNOSIS — E11.9 TYPE 2 DIABETES MELLITUS WITHOUT COMPLICATION, WITHOUT LONG-TERM CURRENT USE OF INSULIN (HCC): ICD-10-CM

## 2020-03-16 DIAGNOSIS — Z98.890 S/P LUMBAR LAMINECTOMY: Primary | ICD-10-CM

## 2020-03-16 PROCEDURE — 99214 OFFICE O/P EST MOD 30 MIN: CPT | Performed by: INTERNAL MEDICINE

## 2020-03-16 PROCEDURE — G0463 HOSPITAL OUTPT CLINIC VISIT: HCPCS | Performed by: INTERNAL MEDICINE

## 2020-03-16 NOTE — PROGRESS NOTES
HPI:   1. S/P lumbar laminectomy    Has been having less pain in the right low back that started radiating to the right leg. Aches less when he walks after he drives or sits too long.  Had a steroid injection 2 weeks ago per physiatry and tried therapy that Date Value   05/13/2016 7.0 (H)   12/23/2015 6.6 (H)   05/08/2015 7.0 (H)     Glycohemoglobin (HgA1c) (%)   Date Value   09/20/2018 6.1 (H)   03/30/2018 7.8 (H)   10/16/2017 7.3 (H)     Cholesterol, Total (mg/dL)   Date Value   03/26/2019 160   03/30/201 • GLIMEPIRIDE 2 MG Oral Tab TAKE 1 TABLET (2 MG TOTAL) BY MOUTH DAILY WITH BREAKFAST.  90 tablet 1   • METFORMIN  MG Oral Tab TAKE 2 TABLETS (1,000 MG TOTAL) BY MOUTH 2 (TWO) TIMES DAILY WITH MEALS. 360 tablet 1   • Levothyroxine Sodium (SYNTHROID) distress  SKIN: no rashes,no suspicious lesions  NECK: supple,no adenopathy,no bruits  LUNGS: clear to auscultation  CARDIO: RRR without murmur  GI: good BS's,no masses, HSM or tenderness  EXTREMITIES: no cyanosis, clubbing or edema   NEURO: sensation is i control weight and lead to better blood pressure control. 4. Mixed Hyperlipidemia    Patient instructed to take cholesterol lowering medications as prescribed and to continue to follow a low fat, low cholesterol diet as discussed.  Try to exercise at callie

## 2020-03-20 ENCOUNTER — LAB ENCOUNTER (OUTPATIENT)
Dept: LAB | Age: 85
End: 2020-03-20
Attending: PHYSICIAN ASSISTANT
Payer: MEDICARE

## 2020-03-20 DIAGNOSIS — E03.8 SUBCLINICAL HYPOTHYROIDISM: ICD-10-CM

## 2020-03-20 DIAGNOSIS — E78.2 MIXED HYPERLIPIDEMIA: ICD-10-CM

## 2020-03-20 DIAGNOSIS — E11.9 TYPE 2 DIABETES MELLITUS WITHOUT COMPLICATION, WITHOUT LONG-TERM CURRENT USE OF INSULIN (HCC): ICD-10-CM

## 2020-03-20 DIAGNOSIS — E03.9 ACQUIRED HYPOTHYROIDISM: ICD-10-CM

## 2020-03-20 DIAGNOSIS — I10 ESSENTIAL HYPERTENSION WITH GOAL BLOOD PRESSURE LESS THAN 130/85: ICD-10-CM

## 2020-03-20 LAB
ALBUMIN SERPL-MCNC: 3.7 G/DL (ref 3.4–5)
ALBUMIN/GLOB SERPL: 1.1 {RATIO} (ref 1–2)
ALP LIVER SERPL-CCNC: 97 U/L (ref 45–117)
ALT SERPL-CCNC: 27 U/L (ref 16–61)
ANION GAP SERPL CALC-SCNC: 6 MMOL/L (ref 0–18)
AST SERPL-CCNC: 15 U/L (ref 15–37)
BACTERIA UR QL AUTO: NEGATIVE /HPF
BASOPHILS # BLD AUTO: 0.04 X10(3) UL (ref 0–0.2)
BASOPHILS NFR BLD AUTO: 0.6 %
BILIRUB SERPL-MCNC: 0.3 MG/DL (ref 0.1–2)
BILIRUB UR QL: NEGATIVE
BUN BLD-MCNC: 19 MG/DL (ref 7–18)
BUN/CREAT SERPL: 16.2 (ref 10–20)
CALCIUM BLD-MCNC: 9.5 MG/DL (ref 8.5–10.1)
CHLORIDE SERPL-SCNC: 107 MMOL/L (ref 98–112)
CHOLEST SMN-MCNC: 177 MG/DL (ref ?–200)
CLARITY UR: CLEAR
CO2 SERPL-SCNC: 26 MMOL/L (ref 21–32)
COLOR UR: YELLOW
CREAT BLD-MCNC: 1.17 MG/DL (ref 0.7–1.3)
DEPRECATED RDW RBC AUTO: 41.8 FL (ref 35.1–46.3)
EOSINOPHIL # BLD AUTO: 0.13 X10(3) UL (ref 0–0.7)
EOSINOPHIL NFR BLD AUTO: 2.1 %
ERYTHROCYTE [DISTWIDTH] IN BLOOD BY AUTOMATED COUNT: 13.2 % (ref 11–15)
EST. AVERAGE GLUCOSE BLD GHB EST-MCNC: 143 MG/DL (ref 68–126)
GLOBULIN PLAS-MCNC: 3.3 G/DL (ref 2.8–4.4)
GLUCOSE BLD-MCNC: 164 MG/DL (ref 70–99)
GLUCOSE UR-MCNC: NEGATIVE MG/DL
HBA1C MFR BLD HPLC: 6.6 % (ref ?–5.7)
HCT VFR BLD AUTO: 36.7 % (ref 39–53)
HDLC SERPL-MCNC: 43 MG/DL (ref 40–59)
HGB BLD-MCNC: 11.9 G/DL (ref 13–17.5)
HGB UR QL STRIP.AUTO: NEGATIVE
IMM GRANULOCYTES # BLD AUTO: 0.03 X10(3) UL (ref 0–1)
IMM GRANULOCYTES NFR BLD: 0.5 %
KETONES UR-MCNC: NEGATIVE MG/DL
LDLC SERPL CALC-MCNC: 104 MG/DL (ref ?–100)
LEUKOCYTE ESTERASE UR QL STRIP.AUTO: NEGATIVE
LYMPHOCYTES # BLD AUTO: 1.86 X10(3) UL (ref 1–4)
LYMPHOCYTES NFR BLD AUTO: 30 %
M PROTEIN MFR SERPL ELPH: 7 G/DL (ref 6.4–8.2)
MCH RBC QN AUTO: 28.4 PG (ref 26–34)
MCHC RBC AUTO-ENTMCNC: 32.4 G/DL (ref 31–37)
MCV RBC AUTO: 87.6 FL (ref 80–100)
MONOCYTES # BLD AUTO: 0.6 X10(3) UL (ref 0.1–1)
MONOCYTES NFR BLD AUTO: 9.7 %
NEUTROPHILS # BLD AUTO: 3.54 X10 (3) UL (ref 1.5–7.7)
NEUTROPHILS # BLD AUTO: 3.54 X10(3) UL (ref 1.5–7.7)
NEUTROPHILS NFR BLD AUTO: 57.1 %
NITRITE UR QL STRIP.AUTO: NEGATIVE
NONHDLC SERPL-MCNC: 134 MG/DL (ref ?–130)
OSMOLALITY SERPL CALC.SUM OF ELEC: 294 MOSM/KG (ref 275–295)
PATIENT FASTING Y/N/NP: YES
PATIENT FASTING Y/N/NP: YES
PH UR: 5 [PH] (ref 5–8)
PLATELET # BLD AUTO: 188 10(3)UL (ref 150–450)
POTASSIUM SERPL-SCNC: 4.7 MMOL/L (ref 3.5–5.1)
PROT UR-MCNC: 30 MG/DL
RBC # BLD AUTO: 4.19 X10(6)UL (ref 3.8–5.8)
RBC #/AREA URNS AUTO: <1 /HPF
SODIUM SERPL-SCNC: 139 MMOL/L (ref 136–145)
SP GR UR STRIP: 1.02 (ref 1–1.03)
T4 FREE SERPL-MCNC: 0.8 NG/DL (ref 0.8–1.7)
TRIGL SERPL-MCNC: 152 MG/DL (ref 30–149)
TSI SER-ACNC: 6.33 MIU/ML (ref 0.36–3.74)
UROBILINOGEN UR STRIP-ACNC: <2
VLDLC SERPL CALC-MCNC: 30 MG/DL (ref 0–30)
WBC # BLD AUTO: 6.2 X10(3) UL (ref 4–11)
WBC #/AREA URNS AUTO: 0 /HPF

## 2020-03-20 PROCEDURE — 36415 COLL VENOUS BLD VENIPUNCTURE: CPT

## 2020-03-20 PROCEDURE — 85025 COMPLETE CBC W/AUTO DIFF WBC: CPT

## 2020-03-20 PROCEDURE — 81001 URINALYSIS AUTO W/SCOPE: CPT

## 2020-03-20 PROCEDURE — 80053 COMPREHEN METABOLIC PANEL: CPT

## 2020-03-20 PROCEDURE — 83036 HEMOGLOBIN GLYCOSYLATED A1C: CPT

## 2020-03-20 PROCEDURE — 84439 ASSAY OF FREE THYROXINE: CPT

## 2020-03-20 PROCEDURE — 80061 LIPID PANEL: CPT

## 2020-03-20 PROCEDURE — 84443 ASSAY THYROID STIM HORMONE: CPT

## 2020-03-26 ENCOUNTER — TELEPHONE (OUTPATIENT)
Dept: INTERNAL MEDICINE CLINIC | Facility: CLINIC | Age: 85
End: 2020-03-26

## 2020-03-26 NOTE — TELEPHONE ENCOUNTER
Ekaterina Gutierrez from [facility] called wanting to see if we have received a Plan of Care from them dated 10/28/2019. They need the Dr to sign off on it and approve it or make adjustments to it if needed.      [facility] Contact: Ph: [phone]  [facility] Fax: [phone]

## 2020-03-26 NOTE — TELEPHONE ENCOUNTER
Form was placed on his desk at the [facility],  [address] location.  Please have him sign and refax

## 2020-05-26 ENCOUNTER — TELEPHONE (OUTPATIENT)
Dept: INTERNAL MEDICINE CLINIC | Facility: CLINIC | Age: 85
End: 2020-05-26

## 2020-05-26 RX ORDER — GLIMEPIRIDE 2 MG/1
2 TABLET ORAL
Qty: 90 TABLET | Refills: 1 | Status: SHIPPED | OUTPATIENT
Start: 2020-05-26 | End: 2020-11-19

## 2020-06-08 RX ORDER — SIMVASTATIN 20 MG
20 TABLET ORAL NIGHTLY
Qty: 90 TABLET | Refills: 1 | Status: SHIPPED | OUTPATIENT
Start: 2020-06-08 | End: 2020-12-21

## 2020-06-18 ENCOUNTER — VIRTUAL PHONE E/M (OUTPATIENT)
Dept: INTERNAL MEDICINE CLINIC | Facility: CLINIC | Age: 85
End: 2020-06-18
Payer: MEDICARE

## 2020-06-18 DIAGNOSIS — M51.37 DDD (DEGENERATIVE DISC DISEASE), LUMBOSACRAL: ICD-10-CM

## 2020-06-18 DIAGNOSIS — E03.9 ACQUIRED HYPOTHYROIDISM: ICD-10-CM

## 2020-06-18 DIAGNOSIS — E11.9 TYPE 2 DIABETES MELLITUS WITHOUT COMPLICATION, WITHOUT LONG-TERM CURRENT USE OF INSULIN (HCC): Primary | ICD-10-CM

## 2020-06-18 DIAGNOSIS — F17.200 TOBACCO USE DISORDER: ICD-10-CM

## 2020-06-18 PROCEDURE — 99442 PHONE E/M BY PHYS 11-20 MIN: CPT | Performed by: INTERNAL MEDICINE

## 2020-06-18 RX ORDER — CYCLOBENZAPRINE HCL 5 MG
TABLET ORAL
Qty: 90 TABLET | Refills: 0 | Status: SHIPPED | OUTPATIENT
Start: 2020-06-18 | End: 2020-08-13

## 2020-06-18 NOTE — PROGRESS NOTES
Patient ID: Monique Barclay is a 82year old male. Virtual/Telephone Check-In    Monique Barclay verbally consents to a Virtual/Telephone Check-In service on 06/18/20.  Patient understands and accepts financial responsibility for any deductible, Cap, Take 1 capsule (300 mg total) by mouth 3 (three) times daily. , Disp: 90 capsule, Rfl: 2  •  traMADol HCl 50 MG Oral Tab, Take 1 tablet (50 mg total) by mouth every 6 (six) hours as needed for Pain., Disp: 30 tablet, Rfl: 1  •  Levothyroxine Sodium (SY activity: Not on file    Other Topics      Concerns:         Service: Not Asked        Blood Transfusions: Not Asked        Caffeine Concern: Yes          1cup coffee        Occupational Exposure: Not Asked        Hobby Hazards: Not Asked        Sl gets worse. REFILL cyclobenzaprine    3. Acquired hypothyroidism    Hypothryoidism recheck. Doing well. TSH mildly elevated on 25 mcg synthroidWill recheck the TSH. 4. Tobacco use disorder    Patient strongly encouraged to stop smoking.  Warned that ora

## 2020-06-22 RX ORDER — BLOOD-GLUCOSE METER
KIT MISCELLANEOUS
Qty: 100 STRIP | Refills: 1 | Status: SHIPPED | OUTPATIENT
Start: 2020-06-22 | End: 2020-06-24

## 2020-06-24 ENCOUNTER — TELEPHONE (OUTPATIENT)
Dept: INTERNAL MEDICINE CLINIC | Facility: CLINIC | Age: 85
End: 2020-06-24

## 2020-06-24 RX ORDER — BLOOD-GLUCOSE METER
KIT MISCELLANEOUS
Qty: 100 STRIP | Refills: 1 | Status: SHIPPED | OUTPATIENT
Start: 2020-06-24 | End: 2020-12-17

## 2020-06-24 NOTE — TELEPHONE ENCOUNTER
Pharmacy states they need diagnosis code for medication below     Glucose Blood (FREESTYLE LITE TEST) In Vitro Strip

## 2020-06-30 RX ORDER — LEVOTHYROXINE SODIUM 0.03 MG/1
25 TABLET ORAL
Qty: 90 TABLET | Refills: 3 | Status: SHIPPED | OUTPATIENT
Start: 2020-06-30 | End: 2020-07-16

## 2020-07-06 ENCOUNTER — LAB ENCOUNTER (OUTPATIENT)
Dept: LAB | Age: 85
End: 2020-07-06
Attending: INTERNAL MEDICINE
Payer: MEDICARE

## 2020-07-06 DIAGNOSIS — E11.9 TYPE 2 DIABETES MELLITUS WITHOUT COMPLICATION, WITHOUT LONG-TERM CURRENT USE OF INSULIN (HCC): ICD-10-CM

## 2020-07-06 DIAGNOSIS — E03.9 ACQUIRED HYPOTHYROIDISM: ICD-10-CM

## 2020-07-06 LAB
CREAT UR-SCNC: 157 MG/DL
EST. AVERAGE GLUCOSE BLD GHB EST-MCNC: 148 MG/DL (ref 68–126)
HBA1C MFR BLD HPLC: 6.8 % (ref ?–5.7)
MICROALBUMIN UR-MCNC: 1.73 MG/DL
MICROALBUMIN/CREAT 24H UR-RTO: 11 UG/MG (ref ?–30)
T4 FREE SERPL-MCNC: 0.9 NG/DL (ref 0.8–1.7)
TSI SER-ACNC: 7.61 MIU/ML (ref 0.36–3.74)

## 2020-07-06 PROCEDURE — 84443 ASSAY THYROID STIM HORMONE: CPT

## 2020-07-06 PROCEDURE — 36415 COLL VENOUS BLD VENIPUNCTURE: CPT

## 2020-07-06 PROCEDURE — 84439 ASSAY OF FREE THYROXINE: CPT

## 2020-07-06 PROCEDURE — 82043 UR ALBUMIN QUANTITATIVE: CPT

## 2020-07-06 PROCEDURE — 82570 ASSAY OF URINE CREATININE: CPT

## 2020-07-06 PROCEDURE — 83036 HEMOGLOBIN GLYCOSYLATED A1C: CPT

## 2020-07-15 RX ORDER — LEVOTHYROXINE SODIUM 0.05 MG/1
50 TABLET ORAL
Qty: 90 TABLET | Refills: 3 | Status: CANCELLED | OUTPATIENT
Start: 2020-07-15

## 2020-07-16 RX ORDER — LEVOTHYROXINE SODIUM 50 UG/1
90 CAPSULE ORAL DAILY
Qty: 90 CAPSULE | Refills: 3 | Status: SHIPPED | OUTPATIENT
Start: 2020-07-16 | End: 2020-08-13

## 2020-08-12 ENCOUNTER — TELEPHONE (OUTPATIENT)
Dept: INTERNAL MEDICINE CLINIC | Facility: CLINIC | Age: 85
End: 2020-08-12

## 2020-08-12 RX ORDER — LEVOTHYROXINE SODIUM 50 UG/1
50 CAPSULE ORAL DAILY
Status: CANCELLED | OUTPATIENT
Start: 2020-08-12 | End: 2020-09-11

## 2020-08-12 NOTE — TELEPHONE ENCOUNTER
Dr Leigh Hart=see below, please clarify SIG=90 mcg or 50 mcg? New prescription for 50 mcg pended for approval.    Please reply to pool: EM RN TRIAGE       Disp Refills Start End    Levothyroxine Sodium 50 MCG Oral Cap 90 capsule 3 7/16/2020 8/15/2020    Sig - Route:  Take 90 mcg by mouth daily. - Oral    Sent to pharmacy as: Levothyroxine Sodium 50 MCG Oral Capsule    E-Prescribing Status: Receipt confirmed by pharmacy (7/16/2020 11:13 AM CDT)

## 2020-08-13 RX ORDER — CYCLOBENZAPRINE HCL 5 MG
TABLET ORAL
Qty: 90 TABLET | Refills: 0 | Status: SHIPPED | OUTPATIENT
Start: 2020-08-13 | End: 2020-10-22

## 2020-08-13 RX ORDER — LEVOTHYROXINE SODIUM 50 UG/1
50 CAPSULE ORAL DAILY
Qty: 90 CAPSULE | Refills: 3 | Status: SHIPPED | OUTPATIENT
Start: 2020-08-13 | End: 2021-09-14

## 2020-08-31 RX ORDER — LISINOPRIL 10 MG/1
TABLET ORAL
Qty: 90 TABLET | Refills: 1 | Status: SHIPPED | OUTPATIENT
Start: 2020-08-31 | End: 2021-02-18

## 2020-10-19 ENCOUNTER — TELEPHONE (OUTPATIENT)
Dept: INTERNAL MEDICINE CLINIC | Facility: CLINIC | Age: 85
End: 2020-10-19

## 2020-10-19 NOTE — TELEPHONE ENCOUNTER
Daughter John Harden, on FYI, questioning who she contacts for a medication refill. RN verified that the Gabapentin was filled by Dr.Behar.

## 2020-10-22 RX ORDER — CYCLOBENZAPRINE HCL 5 MG
TABLET ORAL
Qty: 90 TABLET | Refills: 0 | Status: SHIPPED | OUTPATIENT
Start: 2020-10-22 | End: 2021-01-25

## 2020-11-19 RX ORDER — GLIMEPIRIDE 2 MG/1
TABLET ORAL
Qty: 90 TABLET | Refills: 1 | Status: SHIPPED | OUTPATIENT
Start: 2020-11-19 | End: 2021-05-11

## 2020-12-17 RX ORDER — BLOOD-GLUCOSE METER
KIT MISCELLANEOUS
Qty: 100 STRIP | Refills: 1 | Status: SHIPPED | OUTPATIENT
Start: 2020-12-17 | End: 2021-02-04

## 2020-12-21 RX ORDER — SIMVASTATIN 20 MG
TABLET ORAL
Qty: 90 TABLET | Refills: 1 | Status: SHIPPED | OUTPATIENT
Start: 2020-12-21 | End: 2021-06-14

## 2020-12-24 ENCOUNTER — TELEPHONE (OUTPATIENT)
Dept: PULMONOLOGY | Facility: CLINIC | Age: 85
End: 2020-12-24

## 2021-01-07 ENCOUNTER — HOSPITAL ENCOUNTER (OUTPATIENT)
Dept: CT IMAGING | Facility: HOSPITAL | Age: 86
Discharge: HOME | End: 2021-01-07
Attending: INTERNAL MEDICINE
Payer: MEDICARE

## 2021-01-07 DIAGNOSIS — R91.1 LUNG NODULE: ICD-10-CM

## 2021-01-07 PROCEDURE — 71250 CT THORAX DX C-: CPT | Performed by: INTERNAL MEDICINE

## 2021-01-25 RX ORDER — CYCLOBENZAPRINE HCL 5 MG
TABLET ORAL 3 TIMES DAILY PRN
Qty: 90 TABLET | Refills: 0 | Status: SHIPPED | OUTPATIENT
Start: 2021-01-25 | End: 2021-02-22

## 2021-01-25 RX ORDER — LANCETS 28 GAUGE
EACH MISCELLANEOUS
Qty: 100 EACH | Refills: 3 | Status: SHIPPED | OUTPATIENT
Start: 2021-01-25

## 2021-01-25 NOTE — TELEPHONE ENCOUNTER
Daughter, Diane Boyd on ZBIGNIEW, requesting refill of patient's test strips, lancets and Cyclobenzaprine    Advised test strips were sent in December last year.  She will call pharmacy to refill strips    Lancets and Cyclobenzaprine pended for provider

## 2021-02-04 ENCOUNTER — TELEPHONE (OUTPATIENT)
Dept: INTERNAL MEDICINE CLINIC | Facility: CLINIC | Age: 86
End: 2021-02-04

## 2021-02-04 RX ORDER — BLOOD-GLUCOSE METER
1 KIT MISCELLANEOUS DAILY
Qty: 100 STRIP | Refills: 1 | Status: SHIPPED | OUTPATIENT
Start: 2021-02-04 | End: 2021-02-05

## 2021-02-04 NOTE — TELEPHONE ENCOUNTER
Daughter of patient calling to request prescription for test strips.   She states that pharmacy states they have not received the following prescription from 12/17/20:     Disp Refills Start End    FREESTYLE LITE TEST In Vitro Strip 100 strip 1 12/17/2020

## 2021-02-05 ENCOUNTER — TELEPHONE (OUTPATIENT)
Dept: INTERNAL MEDICINE CLINIC | Facility: CLINIC | Age: 86
End: 2021-02-05

## 2021-02-05 RX ORDER — BLOOD-GLUCOSE METER
1 KIT MISCELLANEOUS DAILY
Qty: 100 STRIP | Refills: 1 | Status: SHIPPED | OUTPATIENT
Start: 2021-02-05 | End: 2021-08-17

## 2021-02-05 NOTE — TELEPHONE ENCOUNTER
Lea/ Pharmacist of [facility] is requesting ICD 10 diagnosis code for medication Glucose Blood (FREESTYLE LITE TEST) In Vitro Strip required by Medicare.

## 2021-02-05 NOTE — TELEPHONE ENCOUNTER
Prescription resent with requested information    Spoke to Franck Mendez.  She verified receipt of updated prescription

## 2021-02-18 RX ORDER — LISINOPRIL 10 MG/1
TABLET ORAL
Qty: 90 TABLET | Refills: 1 | Status: SHIPPED | OUTPATIENT
Start: 2021-02-18 | End: 2021-08-31

## 2021-02-22 RX ORDER — CYCLOBENZAPRINE HCL 5 MG
TABLET ORAL 3 TIMES DAILY PRN
Qty: 90 TABLET | Refills: 0 | Status: SHIPPED | OUTPATIENT
Start: 2021-02-22 | End: 2021-04-21

## 2021-03-03 DIAGNOSIS — Z23 NEED FOR VACCINATION: ICD-10-CM

## 2021-03-07 ENCOUNTER — IMMUNIZATION (OUTPATIENT)
Dept: LAB | Age: 86
End: 2021-03-07
Attending: HOSPITALIST
Payer: MEDICARE

## 2021-03-07 DIAGNOSIS — Z23 NEED FOR VACCINATION: Primary | ICD-10-CM

## 2021-03-07 PROCEDURE — 0001A SARSCOV2 VAC 30MCG/0.3ML IM: CPT

## 2021-03-09 ENCOUNTER — OFFICE VISIT (OUTPATIENT)
Dept: INTERNAL MEDICINE CLINIC | Facility: CLINIC | Age: 86
End: 2021-03-09
Payer: MEDICARE

## 2021-03-09 VITALS
HEART RATE: 76 BPM | DIASTOLIC BLOOD PRESSURE: 72 MMHG | WEIGHT: 193 LBS | HEIGHT: 68 IN | SYSTOLIC BLOOD PRESSURE: 120 MMHG | RESPIRATION RATE: 16 BRPM | BODY MASS INDEX: 29.25 KG/M2

## 2021-03-09 DIAGNOSIS — F17.200 TOBACCO USE DISORDER: ICD-10-CM

## 2021-03-09 DIAGNOSIS — Z00.00 PHYSICAL EXAM, ANNUAL: Primary | ICD-10-CM

## 2021-03-09 DIAGNOSIS — I10 ESSENTIAL HYPERTENSION WITH GOAL BLOOD PRESSURE LESS THAN 130/85: ICD-10-CM

## 2021-03-09 DIAGNOSIS — E03.9 ACQUIRED HYPOTHYROIDISM: ICD-10-CM

## 2021-03-09 DIAGNOSIS — E11.3293 TYPE 2 DIABETES MELLITUS WITH BOTH EYES AFFECTED BY MILD NONPROLIFERATIVE RETINOPATHY WITHOUT MACULAR EDEMA, WITHOUT LONG-TERM CURRENT USE OF INSULIN (HCC): ICD-10-CM

## 2021-03-09 DIAGNOSIS — Z98.890 S/P LUMBAR LAMINECTOMY: ICD-10-CM

## 2021-03-09 DIAGNOSIS — E78.2 MIXED HYPERLIPIDEMIA: ICD-10-CM

## 2021-03-09 PROCEDURE — G0439 PPPS, SUBSEQ VISIT: HCPCS | Performed by: INTERNAL MEDICINE

## 2021-03-09 NOTE — PROGRESS NOTES
REASON FOR VISIT:    Asia Mo is a 82year old male who presents for a Medicare Annual Wellness visit.     Patient Care Team: Patient Care Team:  Toy Taylor MD as PCP - General (Internal Medicine)  Rosio Lyon MD as PCP - [facility] MOUTH TWICE A DAY WITH MEALS 360 tablet 1   • FREESTYLE LANCETS Does not apply Misc Test 1 xs daily   E11.9 non insulin 100 each 5   • gabapentin 300 MG Oral Cap Take 1 capsule (300 mg total) by mouth 3 (three) times daily.  90 capsule 2   • traMADol HCl 50 maintain a good energy level?: Appropriate Exercise  How would you describe your current health state?: Good  How do you maintain positive mental well-being?: Visiting Family  If you are a male age 38-65 or a female age 47-67, do you take aspirin?: Yes  Alize Holbrook no preventive care reminders to display for this patient. Flex Sigmoidoscopy Screen every 5 years No results found for this or any previous visit.     Fecal Occult Blood Annually No results found for: FOB, OCCULTSTOOL   Glaucoma Screening     Ophthalmolo 03/07/2021      Influenza             12/12/2008      Influenza Vaccine, Preserv Free                          12/12/2008      Pneumococcal (Prevnar 13)                          10/16/2017      Pneumovax 23          12/09/2019      TDAP tenderness  : two descended testicles, no masses, no hernia and no penile lesions  RECTAL: normal rectal tone, prostate shows no masses, non tender  MUSCULOSKELETAL: back is not tender, FROM of the back  EXTREMITIES: no cyanosis, clubbing or edema  NEURO Mixed hyperlipidemia    Patient instructed to take cholesterol lowering medications as prescribed and to continue to follow a low fat, low cholesterol diet as discussed.  Discussed lifestyle modifications including reductions in dietary total and saturated

## 2021-03-10 ENCOUNTER — LAB ENCOUNTER (OUTPATIENT)
Dept: LAB | Age: 86
End: 2021-03-10
Attending: INTERNAL MEDICINE
Payer: MEDICARE

## 2021-03-10 DIAGNOSIS — E11.3293 TYPE 2 DIABETES MELLITUS WITH BOTH EYES AFFECTED BY MILD NONPROLIFERATIVE RETINOPATHY WITHOUT MACULAR EDEMA, WITHOUT LONG-TERM CURRENT USE OF INSULIN (HCC): ICD-10-CM

## 2021-03-10 DIAGNOSIS — I10 ESSENTIAL HYPERTENSION WITH GOAL BLOOD PRESSURE LESS THAN 130/85: ICD-10-CM

## 2021-03-10 DIAGNOSIS — E78.2 MIXED HYPERLIPIDEMIA: ICD-10-CM

## 2021-03-10 LAB
ALBUMIN SERPL-MCNC: 3.6 G/DL (ref 3.4–5)
ALBUMIN/GLOB SERPL: 1.2 {RATIO} (ref 1–2)
ALP LIVER SERPL-CCNC: 111 U/L
ALT SERPL-CCNC: 38 U/L
ANION GAP SERPL CALC-SCNC: 4 MMOL/L (ref 0–18)
AST SERPL-CCNC: 19 U/L (ref 15–37)
BASOPHILS # BLD AUTO: 0.03 X10(3) UL (ref 0–0.2)
BASOPHILS NFR BLD AUTO: 0.7 %
BILIRUB SERPL-MCNC: 0.3 MG/DL (ref 0.1–2)
BILIRUB UR QL: NEGATIVE
BUN BLD-MCNC: 23 MG/DL (ref 7–18)
BUN/CREAT SERPL: 20.4 (ref 10–20)
CALCIUM BLD-MCNC: 9.2 MG/DL (ref 8.5–10.1)
CHLORIDE SERPL-SCNC: 110 MMOL/L (ref 98–112)
CHOLEST SMN-MCNC: 134 MG/DL (ref ?–200)
CLARITY UR: CLEAR
CO2 SERPL-SCNC: 25 MMOL/L (ref 21–32)
COLOR UR: YELLOW
CREAT BLD-MCNC: 1.13 MG/DL
CREAT UR-SCNC: 151 MG/DL
DEPRECATED RDW RBC AUTO: 41.7 FL (ref 35.1–46.3)
EOSINOPHIL # BLD AUTO: 0.14 X10(3) UL (ref 0–0.7)
EOSINOPHIL NFR BLD AUTO: 3.2 %
ERYTHROCYTE [DISTWIDTH] IN BLOOD BY AUTOMATED COUNT: 13.3 % (ref 11–15)
EST. AVERAGE GLUCOSE BLD GHB EST-MCNC: 151 MG/DL (ref 68–126)
GLOBULIN PLAS-MCNC: 3.1 G/DL (ref 2.8–4.4)
GLUCOSE BLD-MCNC: 127 MG/DL (ref 70–99)
GLUCOSE UR-MCNC: NEGATIVE MG/DL
HBA1C MFR BLD HPLC: 6.9 % (ref ?–5.7)
HCT VFR BLD AUTO: 34.2 %
HDLC SERPL-MCNC: 32 MG/DL (ref 40–59)
HGB BLD-MCNC: 10.8 G/DL
HGB UR QL STRIP.AUTO: NEGATIVE
IMM GRANULOCYTES # BLD AUTO: 0.02 X10(3) UL (ref 0–1)
IMM GRANULOCYTES NFR BLD: 0.5 %
KETONES UR-MCNC: NEGATIVE MG/DL
LDLC SERPL CALC-MCNC: 66 MG/DL (ref ?–100)
LEUKOCYTE ESTERASE UR QL STRIP.AUTO: NEGATIVE
LYMPHOCYTES # BLD AUTO: 1.38 X10(3) UL (ref 1–4)
LYMPHOCYTES NFR BLD AUTO: 31.7 %
M PROTEIN MFR SERPL ELPH: 6.7 G/DL (ref 6.4–8.2)
MCH RBC QN AUTO: 26.9 PG (ref 26–34)
MCHC RBC AUTO-ENTMCNC: 31.6 G/DL (ref 31–37)
MCV RBC AUTO: 85.1 FL
MICROALBUMIN UR-MCNC: 2.37 MG/DL
MICROALBUMIN/CREAT 24H UR-RTO: 15.7 UG/MG (ref ?–30)
MONOCYTES # BLD AUTO: 0.57 X10(3) UL (ref 0.1–1)
MONOCYTES NFR BLD AUTO: 13.1 %
NEUTROPHILS # BLD AUTO: 2.21 X10 (3) UL (ref 1.5–7.7)
NEUTROPHILS # BLD AUTO: 2.21 X10(3) UL (ref 1.5–7.7)
NEUTROPHILS NFR BLD AUTO: 50.8 %
NITRITE UR QL STRIP.AUTO: NEGATIVE
NONHDLC SERPL-MCNC: 102 MG/DL (ref ?–130)
OSMOLALITY SERPL CALC.SUM OF ELEC: 293 MOSM/KG (ref 275–295)
PATIENT FASTING Y/N/NP: YES
PATIENT FASTING Y/N/NP: YES
PH UR: 5 [PH] (ref 5–8)
PLATELET # BLD AUTO: 224 10(3)UL (ref 150–450)
POTASSIUM SERPL-SCNC: 4.4 MMOL/L (ref 3.5–5.1)
PROT UR-MCNC: NEGATIVE MG/DL
RBC # BLD AUTO: 4.02 X10(6)UL
SODIUM SERPL-SCNC: 139 MMOL/L (ref 136–145)
SP GR UR STRIP: 1.02 (ref 1–1.03)
T3FREE SERPL-MCNC: 3.98 PG/ML (ref 2.4–4.2)
T4 FREE SERPL-MCNC: 1.6 NG/DL (ref 0.8–1.7)
TRIGL SERPL-MCNC: 181 MG/DL (ref 30–149)
TSI SER-ACNC: <0.005 MIU/ML (ref 0.36–3.74)
UROBILINOGEN UR STRIP-ACNC: <2
VLDLC SERPL CALC-MCNC: 36 MG/DL (ref 0–30)
WBC # BLD AUTO: 4.4 X10(3) UL (ref 4–11)

## 2021-03-10 PROCEDURE — 83036 HEMOGLOBIN GLYCOSYLATED A1C: CPT

## 2021-03-10 PROCEDURE — 84481 FREE ASSAY (FT-3): CPT

## 2021-03-10 PROCEDURE — 80053 COMPREHEN METABOLIC PANEL: CPT

## 2021-03-10 PROCEDURE — 80061 LIPID PANEL: CPT

## 2021-03-10 PROCEDURE — 81003 URINALYSIS AUTO W/O SCOPE: CPT

## 2021-03-10 PROCEDURE — 85025 COMPLETE CBC W/AUTO DIFF WBC: CPT

## 2021-03-10 PROCEDURE — 84443 ASSAY THYROID STIM HORMONE: CPT

## 2021-03-10 PROCEDURE — 82570 ASSAY OF URINE CREATININE: CPT

## 2021-03-10 PROCEDURE — 36415 COLL VENOUS BLD VENIPUNCTURE: CPT

## 2021-03-10 PROCEDURE — 82043 UR ALBUMIN QUANTITATIVE: CPT

## 2021-03-10 PROCEDURE — 84439 ASSAY OF FREE THYROXINE: CPT

## 2021-03-28 ENCOUNTER — IMMUNIZATION (OUTPATIENT)
Dept: LAB | Age: 86
End: 2021-03-28
Attending: HOSPITALIST
Payer: MEDICARE

## 2021-03-28 DIAGNOSIS — Z23 NEED FOR VACCINATION: Primary | ICD-10-CM

## 2021-03-28 PROCEDURE — 0002A SARSCOV2 VAC 30MCG/0.3ML IM: CPT

## 2021-04-05 ENCOUNTER — OFFICE VISIT (OUTPATIENT)
Dept: ENDOCRINOLOGY CLINIC | Facility: CLINIC | Age: 86
End: 2021-04-05
Payer: MEDICARE

## 2021-04-05 VITALS
HEIGHT: 68 IN | WEIGHT: 193 LBS | HEART RATE: 71 BPM | SYSTOLIC BLOOD PRESSURE: 131 MMHG | DIASTOLIC BLOOD PRESSURE: 61 MMHG | BODY MASS INDEX: 29.25 KG/M2

## 2021-04-05 DIAGNOSIS — E05.90 SUBCLINICAL HYPERTHYROIDISM: Primary | ICD-10-CM

## 2021-04-05 PROCEDURE — 99204 OFFICE O/P NEW MOD 45 MIN: CPT | Performed by: INTERNAL MEDICINE

## 2021-04-05 NOTE — H&P
Name: Arturo Ventura  Date: 4/5/2021    Referring Physician: No ref. provider found    Patient presents with:  Consult: pcp referred for hyperthyroid. C/o of dry throat.        HISTORY OF PRESENT ILLNESS   Arturo Ventura is a 82year old male who pres LISINOPRIL 10 MG Oral Tab, TAKE 1 TABLET BY MOUTH EVERY DAY, Disp: 90 tablet, Rfl: 1  •  Glucose Blood (FREESTYLE LITE TEST) In Vitro Strip, 1 lancet by Finger stick route daily. , Disp: 100 strip, Rfl: 1  •  FreeStyle Lancets Does not apply Misc, TEST ONCE intact  Respiratory:  clear to auscultation bilaterally  Cardiovascular:  regular rate, rhythm, , no murmurs, S3 or S4  Musculoskeletal:  normal muscle strength and tone  PV: normal pulses of carotids, pedals  Skin:  normal moisture and skin texture  Hair family member concerns were answered to the best of my knowledge. 4/5/2021  Lashawn Walker MD

## 2021-04-08 NOTE — LETTER
Worker's Compensation Follow-Up Office Visit    Date of Visit:  4/8/2021  Employer:  [employer]  Date of Injury:  4/1/2021    CHIEF COMPLAINT:    Chief Complaint   Patient presents with   • Worker's Compensation       HISTORY OF PRESENT ILLNESS:  Richard Barclay is a 62 year old male, who presents with a complaint of an injury that reportedly occurred during work activities.  He works at [employer]    He is here for follow-up on a condition that started 4/1/2021.    Patient is currently being treated for wound left wrist.  He is here to have 2 sutures removed.  The swelling has gone down, the bruising is resolving, he does have some diminished sensation on the thenar eminence and also the flexor surface of the palm and going in to the 2nd finger but the 5th and 4th and probably the 3rd digits and that part of the palm is normal compared to the right side.  Strength is normal.  There have been no signs of infection.      He denies any other precipitating event or activity.    SMOKING HISTORY:  Smoking history is reviewed in the record.    ALLERGIES:  Allergies are reviewed in the record.    CURRENT MEDICATIONS:  Current medications are reviewed.  Medications relevant to this injury include:  None    PROBLEM LIST:  The Problem List is reviewed in the record and findings relevant to the injury are included in the HPI (history of present illness).    PAST HISTORY: Past medical history and past surgical history are reviewed in the record and findings relevant to the injury are included in the HPI.    Review of Systems   Constitutional: Negative.    Musculoskeletal: Positive for joint swelling.   Skin: Positive for wound.   Neurological: Positive for numbness.   Hematological: Negative.    Psychiatric/Behavioral: Negative.        Vitals:    04/08/21 1422   BP: 102/62   Pulse: 73   Resp: 12       PHYSICAL EXAM     Physical Exam  Vitals reviewed.   Constitutional:       Appearance: Normal appearance. He is  BRINDA. Notifier: [email or web address]   JATINDER. Patient Name: Mandi Berman.  Identification Number: [id number]      Advance Beneficiary Notice of Noncoverage (ABN)  NOTE:  If Medicare doesn’t pay for D.RIGHT greater trochanter CSI under ultrasound guidance I made to you, less co-pays or deductibles. ? OPTION 2. I want the D.RIGHT greater trochanter CSI under ultrasound guidance. listed above, but do not bill Medicare. You may ask to be paid now as I am responsible for payment.  I cannot appeal if Medicare normal weight.   Skin:     General: Skin is warm and dry.      Capillary Refill: Capillary refill takes less than 2 seconds.      Findings: Bruising and lesion present.   Neurological:      Mental Status: He is alert.      Sensory: Sensory deficit present.      Motor: No weakness.      Coordination: Coordination normal.   Psychiatric:         Mood and Affect: Mood normal.     Wound evaluation:  Edges are well approximated without evidence of infection as in no abnormal heat, no drainage, nontender to palpation, but there is diminished sensation extending distally into the thenar prominence across the palm into the 2nd digit flexor surface partially the 3rd digit and this is in comparison to the right.  There is sensation but it is diminished.  Strength however in extension and flexion was normal,  and wrist strength was normal.    ASSESSMENT & PLAN     ASSESSMENT:  Stab wound with utility knife to left wrist, healing.  Two sutures removed uneventfully.  Well-approximated edges.  Probable sensory nerve damage with diminished sensation on the thenar eminence in flexor surface of the palm and 2nd digit very minimally involving the 3rd digit.    PLAN:  He is returned to full duty work but he is done working for this week.  Two Steri-Strips were placed and their care was explained and he will remove them when they are no longer adherent.  He will return in about a month just to recheck his neurologic status.  He was given a roll of Coban and some 2 x 3 adhesive Telfa dressings to keep it clean when working in a dirty environment.  RESTRICTIONS:  None      CAUSATION STATEMENT:  This condition is Work related    Anticipated Maximum Medical Improvement:  Possibly months in regard to the return of normal sensation      FOLLOW-UP:  Return in about 1 month (around 5/8/2021).    He is to follow-up sooner if his symptoms should get worse.    See Return to Work Report for further information.    Seen under the supervision of  Dr. Llamas.

## 2021-04-21 RX ORDER — CYCLOBENZAPRINE HCL 5 MG
TABLET ORAL 3 TIMES DAILY PRN
Qty: 90 TABLET | Refills: 0 | Status: SHIPPED | OUTPATIENT
Start: 2021-04-21 | End: 2021-05-28

## 2021-05-04 ENCOUNTER — HOSPITAL ENCOUNTER (OUTPATIENT)
Dept: ULTRASOUND IMAGING | Age: 86
Discharge: HOME | End: 2021-05-04
Attending: INTERNAL MEDICINE
Payer: MEDICARE

## 2021-05-04 DIAGNOSIS — E05.90 SUBCLINICAL HYPERTHYROIDISM: ICD-10-CM

## 2021-05-04 PROCEDURE — 76536 US EXAM OF HEAD AND NECK: CPT | Performed by: INTERNAL MEDICINE

## 2021-05-07 ENCOUNTER — TELEPHONE (OUTPATIENT)
Dept: ENDOCRINOLOGY CLINIC | Facility: CLINIC | Age: 86
End: 2021-05-07

## 2021-05-07 DIAGNOSIS — E05.90 HYPERTHYROIDISM: Primary | ICD-10-CM

## 2021-05-07 NOTE — TELEPHONE ENCOUNTER
Hi!  Could we call this patient and let him know that I have reviewed his thyroid US and he has two nodules. They do not look suspicious. I would like him to get repeat thyroid function tests. I will put them in the computer. Thank you!

## 2021-05-07 NOTE — TELEPHONE ENCOUNTER
Spoke to patient---advised him on lab results and to get blood work done.  There was a communication barrier--so I spoke to daughter Alysia Ruiz who is listed on the ZBIGNEIW--reviewed lab results, per Alysia Ruiz- pt will get blood work repeated per Dr. Karin Luciano

## 2021-05-11 RX ORDER — GLIMEPIRIDE 2 MG/1
2 TABLET ORAL
Qty: 90 TABLET | Refills: 1 | Status: SHIPPED | OUTPATIENT
Start: 2021-05-11 | End: 2021-11-03

## 2021-05-12 ENCOUNTER — LAB ENCOUNTER (OUTPATIENT)
Dept: LAB | Age: 86
End: 2021-05-12
Attending: HOSPITALIST
Payer: MEDICARE

## 2021-05-12 DIAGNOSIS — E05.90 HYPERTHYROIDISM: ICD-10-CM

## 2021-05-12 PROCEDURE — 84443 ASSAY THYROID STIM HORMONE: CPT

## 2021-05-12 PROCEDURE — 84481 FREE ASSAY (FT-3): CPT

## 2021-05-12 PROCEDURE — 84439 ASSAY OF FREE THYROXINE: CPT

## 2021-05-12 PROCEDURE — 36415 COLL VENOUS BLD VENIPUNCTURE: CPT

## 2021-05-16 ENCOUNTER — TELEPHONE (OUTPATIENT)
Dept: ENDOCRINOLOGY CLINIC | Facility: CLINIC | Age: 86
End: 2021-05-16

## 2021-05-16 DIAGNOSIS — E05.90 SUBCLINICAL HYPERTHYROIDISM: Primary | ICD-10-CM

## 2021-05-17 NOTE — TELEPHONE ENCOUNTER
Hi!  Could we please call this patient and let him know that his thyroid function tests are completely normal now and I think that I would just like to follow them up in three months and see him in follow up at that time? Thank you!

## 2021-05-28 RX ORDER — CYCLOBENZAPRINE HCL 5 MG
TABLET ORAL 3 TIMES DAILY PRN
Qty: 90 TABLET | Refills: 0 | Status: SHIPPED | OUTPATIENT
Start: 2021-05-28 | End: 2021-06-28

## 2021-06-14 RX ORDER — SIMVASTATIN 20 MG
20 TABLET ORAL NIGHTLY
Qty: 90 TABLET | Refills: 1 | Status: SHIPPED | OUTPATIENT
Start: 2021-06-14 | End: 2021-12-09

## 2021-06-28 RX ORDER — CYCLOBENZAPRINE HCL 5 MG
TABLET ORAL 3 TIMES DAILY PRN
Qty: 90 TABLET | Refills: 0 | Status: SHIPPED | OUTPATIENT
Start: 2021-06-28 | End: 2021-08-31

## 2021-07-22 ENCOUNTER — OFFICE VISIT (OUTPATIENT)
Dept: INTERNAL MEDICINE CLINIC | Facility: CLINIC | Age: 86
End: 2021-07-22
Payer: MEDICARE

## 2021-07-22 VITALS
BODY MASS INDEX: 29.86 KG/M2 | DIASTOLIC BLOOD PRESSURE: 68 MMHG | SYSTOLIC BLOOD PRESSURE: 133 MMHG | RESPIRATION RATE: 16 BRPM | HEART RATE: 70 BPM | OXYGEN SATURATION: 97 % | HEIGHT: 68 IN | WEIGHT: 197 LBS

## 2021-07-22 DIAGNOSIS — I10 ESSENTIAL HYPERTENSION WITH GOAL BLOOD PRESSURE LESS THAN 130/85: Primary | ICD-10-CM

## 2021-07-22 DIAGNOSIS — E78.2 MIXED HYPERLIPIDEMIA: ICD-10-CM

## 2021-07-22 DIAGNOSIS — E11.3293 TYPE 2 DIABETES MELLITUS WITH BOTH EYES AFFECTED BY MILD NONPROLIFERATIVE RETINOPATHY WITHOUT MACULAR EDEMA, WITHOUT LONG-TERM CURRENT USE OF INSULIN (HCC): ICD-10-CM

## 2021-07-22 DIAGNOSIS — E05.90 SUBCLINICAL HYPERTHYROIDISM: ICD-10-CM

## 2021-07-22 PROBLEM — Z00.00 PHYSICAL EXAM, ANNUAL: Status: RESOLVED | Noted: 2021-03-09 | Resolved: 2021-07-22

## 2021-07-22 PROCEDURE — 99214 OFFICE O/P EST MOD 30 MIN: CPT | Performed by: INTERNAL MEDICINE

## 2021-07-22 RX ORDER — LANCETS 28 GAUGE
EACH MISCELLANEOUS
Qty: 100 EACH | Refills: 5 | Status: SHIPPED | OUTPATIENT
Start: 2021-07-22 | End: 2021-08-17

## 2021-07-22 NOTE — PROGRESS NOTES
Zoey Reich is a 82year old male. Patient presents with:  Establish Care: NP here to establish care     HPI:   82-year-old gentleman with a past medical history of diabetes, hypertension, dyslipidemia, subclinical hyperthyroidism, DJD status post severino scanned into media tab   • OTHER SURGICAL HISTORY Right       Social History:  Social History    Tobacco Use      Smoking status: Former Smoker        Packs/day: 0.25        Years: 50.00        Pack years: 12.5        Quit date: 11/17/2019        Years sin Normal breath sounds. No rhonchi or rales. Abdominal:      General: Bowel sounds are normal.      Palpations: Abdomen is soft. Tenderness: There is no abdominal tenderness. Musculoskeletal:      Cervical back: Neck supple.       Right lower leg: No

## 2021-07-24 ENCOUNTER — TELEPHONE (OUTPATIENT)
Dept: INTERNAL MEDICINE CLINIC | Facility: CLINIC | Age: 86
End: 2021-07-24

## 2021-08-14 RX ORDER — LEVOTHYROXINE SODIUM 0.05 MG/1
TABLET ORAL
Qty: 90 TABLET | Refills: 3 | OUTPATIENT
Start: 2021-08-14

## 2021-08-14 NOTE — TELEPHONE ENCOUNTER
Hi!  Patient should not be on any thyroid medication. Please let the patient know that he should not be taking thyroid medication and to remind him to have his thyroid function tests done before his follow up visit. Thank you!

## 2021-08-17 ENCOUNTER — TELEPHONE (OUTPATIENT)
Dept: INTERNAL MEDICINE CLINIC | Facility: CLINIC | Age: 86
End: 2021-08-17

## 2021-08-17 RX ORDER — BLOOD-GLUCOSE METER
1 KIT MISCELLANEOUS DAILY
Qty: 100 STRIP | Refills: 3 | Status: SHIPPED | OUTPATIENT
Start: 2021-08-17

## 2021-08-17 RX ORDER — LANCETS 28 GAUGE
EACH MISCELLANEOUS
Qty: 100 EACH | Refills: 5 | Status: SHIPPED | OUTPATIENT
Start: 2021-08-17

## 2021-08-17 NOTE — TELEPHONE ENCOUNTER
Advised patient's daughter, Kitty Harini (on ZBIGNIEW) of Dr Lisseth Ward note. Patient's daughter verbalized understanding.

## 2021-08-17 NOTE — TELEPHONE ENCOUNTER
Patient's daughter Uri Hong (on ZBIGNIEW), states that he was told by the pharmacy that his test strips were denied. Spoke to [facility]  and advised that the FreeStyle Lancets were sent on 7/22/21 however they did not receive them.  Resent and signed per pato

## 2021-08-17 NOTE — TELEPHONE ENCOUNTER
Refill Protocol Appointment Criteria REFILLED PER PROTOCOL.     · Appointment scheduled in the past 12 months or in the next 3 months  Recent Outpatient Visits            3 weeks ago Essential hypertension with goal blood pressure less than 130/85    [place]

## 2021-08-20 ENCOUNTER — LAB ENCOUNTER (OUTPATIENT)
Dept: LAB | Age: 86
End: 2021-08-20
Attending: INTERNAL MEDICINE
Payer: MEDICARE

## 2021-08-20 DIAGNOSIS — E05.90 SUBCLINICAL HYPERTHYROIDISM: ICD-10-CM

## 2021-08-20 LAB
T4 FREE SERPL-MCNC: 0.9 NG/DL (ref 0.8–1.7)
TSI SER-ACNC: 5.9 MIU/ML (ref 0.36–3.74)

## 2021-08-20 PROCEDURE — 84443 ASSAY THYROID STIM HORMONE: CPT

## 2021-08-20 PROCEDURE — 84439 ASSAY OF FREE THYROXINE: CPT

## 2021-08-20 PROCEDURE — 36415 COLL VENOUS BLD VENIPUNCTURE: CPT

## 2021-08-24 ENCOUNTER — OFFICE VISIT (OUTPATIENT)
Dept: ENDOCRINOLOGY CLINIC | Facility: CLINIC | Age: 86
End: 2021-08-24
Payer: MEDICARE

## 2021-08-24 VITALS
HEART RATE: 73 BPM | BODY MASS INDEX: 30 KG/M2 | DIASTOLIC BLOOD PRESSURE: 75 MMHG | WEIGHT: 198 LBS | SYSTOLIC BLOOD PRESSURE: 118 MMHG

## 2021-08-24 DIAGNOSIS — E04.1 THYROID NODULE: ICD-10-CM

## 2021-08-24 DIAGNOSIS — R94.6 ABNORMAL THYROID FUNCTION TEST: Primary | ICD-10-CM

## 2021-08-24 PROCEDURE — 99214 OFFICE O/P EST MOD 30 MIN: CPT | Performed by: INTERNAL MEDICINE

## 2021-08-24 NOTE — PROGRESS NOTES
Name: Sandra Taylor  Date: 8/24/21    Referring Physician: No ref. provider found    Patient presents with:  Hyperthyroidism: Pt is here to follow up on thyroid.       HISTORY OF PRESENT ILLNESS   Sandra Taylor is a 82year old male who presents for socially    Drug use: No      Medications:     Current Outpatient Medications:   •  FreeStyle Lancets Does not apply Misc, Test 1 xs daily   E11.9 non insulin, Disp: 100 each, Rfl: 5  •  Glucose Blood (FREESTYLE LITE TEST) In Vitro Strip, 1 lancet by [employer] grossly intact  Respiratory:  clear to auscultation bilaterally  Cardiovascular:  regular rate, rhythm, , no murmurs, S3 or S4  Musculoskeletal:  normal muscle strength and tone  PV: normal pulses of carotids, pedals  Skin:  normal moisture and skin textur guidelines, no additional follow-up required.                  ASSESSMENT/PLAN: This is an 82year-old man here with his daughter for evaluation and management of subclinical hyperthyroidism. This has resolved.  His thyroid function tests have since been wi

## 2021-08-30 ENCOUNTER — TELEPHONE (OUTPATIENT)
Dept: ENDOCRINOLOGY CLINIC | Facility: CLINIC | Age: 86
End: 2021-08-30

## 2021-08-31 RX ORDER — LEVOTHYROXINE SODIUM 0.05 MG/1
TABLET ORAL
Qty: 90 TABLET | Refills: 3 | OUTPATIENT
Start: 2021-08-31

## 2021-08-31 RX ORDER — CYCLOBENZAPRINE HCL 5 MG
TABLET ORAL 3 TIMES DAILY PRN
Qty: 90 TABLET | Refills: 0 | Status: SHIPPED | OUTPATIENT
Start: 2021-08-31 | End: 2021-10-06

## 2021-08-31 RX ORDER — LISINOPRIL 10 MG/1
TABLET ORAL
Qty: 90 TABLET | Refills: 1 | Status: SHIPPED | OUTPATIENT
Start: 2021-08-31

## 2021-08-31 NOTE — TELEPHONE ENCOUNTER
Left message to call back. Transfer to triage    Tez Griffin MD  Em Rn Triage 1 hour ago (10:11 AM)     Endocrinology managing thyroid medication.    Cyclobenzaprine have been refilled.  Please instruct patient not to drive after taking cyclobenzaprine

## 2021-08-31 NOTE — TELEPHONE ENCOUNTER
Spoke to patient's daughter-she states pharmacy is asking to refill patient's levothyroxine - per Dr. Abbi Christina: patient should not be on thyroid medication at this time - daughter stated understanding    RN spoke to [facility] to advise patient no longer on lev

## 2021-08-31 NOTE — TELEPHONE ENCOUNTER
Spoke with pt's daughter Marija Trevino),  verified. She wants to f/u on pt rx ref for lisinopril, pt is out of meds. pls advise, thanks in advance.        Requested Prescriptions     Pending Prescriptions Disp Refills   • LISINOPRIL 10 MG Oral Tab [Pharmacy Me

## 2021-08-31 NOTE — TELEPHONE ENCOUNTER
Please let the daughter Vivian Ortega know that I would like to just review the thyroid function tests with them and then decide whether or not he needs to be seen or not. Thank you!

## 2021-09-14 RX ORDER — LEVOTHYROXINE SODIUM 0.05 MG/1
50 TABLET ORAL DAILY
Qty: 90 TABLET | Refills: 1 | Status: SHIPPED | OUTPATIENT
Start: 2021-09-14 | End: 2021-11-23

## 2021-09-14 NOTE — TELEPHONE ENCOUNTER
Please review. Protocol Failed / No Protocol.     Requested Prescriptions   Pending Prescriptions Disp Refills    LEVOTHYROXINE 50 MCG Oral Tab [Pharmacy Med Name: LEVOTHYROXINE 50 MCG TABLET] 90 tablet 3     Sig: TAKE 1 TABLET BY MOUTH EVERY DAY        Th

## 2021-09-21 NOTE — TELEPHONE ENCOUNTER
Current Outpatient Medications:   •  METFORMIN  MG Oral Tab, TAKE 2 TABLETS BY MOUTH TWICE A DAY WITH MEALS, Disp: 360 tablet, Rfl: 1

## 2021-10-01 RX ORDER — CYCLOBENZAPRINE HCL 5 MG
TABLET ORAL 3 TIMES DAILY PRN
Qty: 90 TABLET | Refills: 0 | OUTPATIENT
Start: 2021-10-01

## 2021-10-01 NOTE — TELEPHONE ENCOUNTER
Please check with the patient and see whether he is still taking cyclobenzaprine three times a day. I have refilled 1 month back. Pharmacy is requesting for a new refill now  We need to slowly wean off this medication.   If he needs it, I will send it to

## 2021-10-06 ENCOUNTER — TELEPHONE (OUTPATIENT)
Dept: INTERNAL MEDICINE CLINIC | Facility: CLINIC | Age: 86
End: 2021-10-06

## 2021-10-06 RX ORDER — CYCLOBENZAPRINE HCL 5 MG
TABLET ORAL 3 TIMES DAILY PRN
Qty: 90 TABLET | Refills: 0 | OUTPATIENT
Start: 2021-10-06

## 2021-10-06 RX ORDER — CYCLOBENZAPRINE HCL 10 MG
10 TABLET ORAL 2 TIMES DAILY PRN
Qty: 60 TABLET | Refills: 3 | Status: SHIPPED | OUTPATIENT
Start: 2021-10-06 | End: 2022-01-27

## 2021-10-06 NOTE — TELEPHONE ENCOUNTER
Dr. Vern Jara- Patient's daughter states for cyclobenzaprine he takes 2 tablets in the morning and 2 tablets in the evening. She states if patient does not do this, his entire leg locks up and he cannot move.  She is wondering if it should be upped to 10mg BI

## 2021-10-06 NOTE — TELEPHONE ENCOUNTER
Okay.  Noted. I have sent cyclobenzaprine 10 mg. He can take 1 tablet every 12 hours on an as-needed basis. Please inform patient/daughter.   Thank you

## 2021-10-06 NOTE — TELEPHONE ENCOUNTER
Verified name and  of patient. Daughter of patient (emergency contact) was informed that prescription was sent to pharmacy.

## 2021-11-03 RX ORDER — GLIMEPIRIDE 2 MG/1
2 TABLET ORAL
Qty: 90 TABLET | Refills: 0 | Status: SHIPPED | OUTPATIENT
Start: 2021-11-03 | End: 2021-12-02

## 2021-11-03 NOTE — TELEPHONE ENCOUNTER
Current Outpatient Medications:     •  glimepiride 2 MG Oral Tab, Take 1 tablet (2 mg total) by mouth daily with breakfast., Disp: 90 tablet, Rfl: 1  •

## 2021-11-03 NOTE — TELEPHONE ENCOUNTER
Please review; protocol failed.  Last A1C done 3/10/21  Diabetes Medications  Protocol Criteria:  · Appointment scheduled in the past 6 months or the next 3 months  · A1C < 7.5 in the past 6 months  · Creatinine in the past 12 months  · Creatinine result <

## 2021-11-23 ENCOUNTER — OFFICE VISIT (OUTPATIENT)
Dept: INTERNAL MEDICINE CLINIC | Facility: CLINIC | Age: 86
End: 2021-11-23
Payer: MEDICARE

## 2021-11-23 VITALS
OXYGEN SATURATION: 95 % | WEIGHT: 199 LBS | BODY MASS INDEX: 30.16 KG/M2 | HEART RATE: 84 BPM | DIASTOLIC BLOOD PRESSURE: 80 MMHG | SYSTOLIC BLOOD PRESSURE: 130 MMHG | HEIGHT: 68 IN | RESPIRATION RATE: 14 BRPM

## 2021-11-23 DIAGNOSIS — E03.9 ACQUIRED HYPOTHYROIDISM: ICD-10-CM

## 2021-11-23 DIAGNOSIS — E78.2 MIXED HYPERLIPIDEMIA: ICD-10-CM

## 2021-11-23 DIAGNOSIS — E11.3293 TYPE 2 DIABETES MELLITUS WITH BOTH EYES AFFECTED BY MILD NONPROLIFERATIVE RETINOPATHY WITHOUT MACULAR EDEMA, WITHOUT LONG-TERM CURRENT USE OF INSULIN (HCC): ICD-10-CM

## 2021-11-23 DIAGNOSIS — I10 ESSENTIAL HYPERTENSION WITH GOAL BLOOD PRESSURE LESS THAN 130/85: Primary | ICD-10-CM

## 2021-11-23 DIAGNOSIS — Z23 INFLUENZA VACCINE NEEDED: ICD-10-CM

## 2021-11-23 PROCEDURE — 90662 IIV NO PRSV INCREASED AG IM: CPT | Performed by: INTERNAL MEDICINE

## 2021-11-23 PROCEDURE — G0008 ADMIN INFLUENZA VIRUS VAC: HCPCS | Performed by: INTERNAL MEDICINE

## 2021-11-23 PROCEDURE — 99214 OFFICE O/P EST MOD 30 MIN: CPT | Performed by: INTERNAL MEDICINE

## 2021-11-23 NOTE — PROGRESS NOTES
Lorena Hinojosa is a 82year old male. Patient presents with: Follow - Up: 4 mos Fu   Injection: Will like Flu vaccine     HPI:   29-year-old gentleman here for follow-up. He report that he is doing well.   He denies any chest pain or shortness of breath History    Tobacco Use      Smoking status: Former Smoker        Packs/day: 0.25        Years: 50.00        Pack years: 12.5        Quit date: 2019        Years since quittin.0      Smokeless tobacco: Never Used    Vaping Use      Vaping Use: Nev sounds are normal.      Palpations: Abdomen is soft. Tenderness: There is no abdominal tenderness. Musculoskeletal:      Cervical back: Neck supple. Right lower leg: No edema. Left lower leg: No edema.    Skin:     General: Skin is warm and

## 2021-12-02 RX ORDER — GLIMEPIRIDE 2 MG/1
2 TABLET ORAL
Qty: 90 TABLET | Refills: 0 | Status: SHIPPED | OUTPATIENT
Start: 2021-12-02

## 2021-12-02 NOTE — TELEPHONE ENCOUNTER
Please review; protocol failed or has no protocol    Requested Prescriptions   Pending Prescriptions Disp Refills    GLIMEPIRIDE 2 MG Oral Tab [Pharmacy Med Name: GLIMEPIRIDE 2 MG TABLET] 90 tablet 0     Sig: TAKE 1 TABLET BY MOUTH DAILY WITH BREAKFAST

## 2021-12-09 RX ORDER — SIMVASTATIN 20 MG
20 TABLET ORAL NIGHTLY
Qty: 90 TABLET | Refills: 1 | Status: SHIPPED | OUTPATIENT
Start: 2021-12-09 | End: 2022-03-23

## 2021-12-09 NOTE — TELEPHONE ENCOUNTER
Refill passed per [facility] protocol. Requested Prescriptions   Pending Prescriptions Disp Refills    simvastatin 20 MG Oral Tab 90 tablet 1     Sig: Take 1 tablet (20 mg total) by mouth nightly. Cholesterol Medication Protocol Passed - 12/9/2021 10:47 AM        Passed - ALT in past 12 months        Passed - LDL in past 12 months        Passed - Last ALT < 80       Lab Results   Component Value Date    ALT 38 03/10/2021             Passed - Last LDL < 130     Lab Results   Component Value Date    LDL 66 03/10/2021               Passed - Appointment in past 12 or next 3 months            Future Appointments         Provider Department Appt Notes    In 3 weeks [facility] [address] Scheduled w/pt's daughter Saniya Toth).  MS    In 3 months Hettie Kanner, MD [facility], [address], [facility]           Recent Outpatient Visits              2 weeks ago Essential hypertension with goal blood pressure less than 130/85    [facility], [address], Erin Miller MD    Office Visit    3 months ago Abnormal thyroid function test    [facility] Endocrinology Chiquita Rubin MD    Office Visit    4 months ago Essential hypertension with goal blood pressure less than 130/85    [facility] [place], Erin Miller MD    Office Visit    8 months ago Subclinical hyperthyroidism    [facility] Endocrinology Chiquita Rubin MD    Office Visit    9 months ago Physical exam, annual    [place], [place] Nan, MD    Office Visit

## 2021-12-22 ENCOUNTER — TELEPHONE (OUTPATIENT)
Dept: PULMONOLOGY | Facility: CLINIC | Age: 86
End: 2021-12-22

## 2022-01-03 ENCOUNTER — HOSPITAL ENCOUNTER (OUTPATIENT)
Dept: CT IMAGING | Age: 87
Discharge: HOME | End: 2022-01-03
Attending: INTERNAL MEDICINE
Payer: MEDICARE

## 2022-01-03 DIAGNOSIS — R91.1 LUNG NODULE: ICD-10-CM

## 2022-01-03 LAB — CREAT BLD-MCNC: 1.2 MG/DL

## 2022-01-03 PROCEDURE — 82565 ASSAY OF CREATININE: CPT

## 2022-01-03 PROCEDURE — 71260 CT THORAX DX C+: CPT | Performed by: INTERNAL MEDICINE

## 2022-01-06 ENCOUNTER — TELEPHONE (OUTPATIENT)
Dept: PULMONOLOGY | Facility: CLINIC | Age: 87
End: 2022-01-06

## 2022-01-06 NOTE — TELEPHONE ENCOUNTER
----- Message from Nasra Stroud DO sent at 1/5/2022  3:36 PM CST -----  You may let the patient know that reviewed CT chest with stable appearance of lung nodules over 2 years duration.   He does not need follow-up CT chest from my perspective

## 2022-01-12 ENCOUNTER — TELEPHONE (OUTPATIENT)
Dept: INTERNAL MEDICINE CLINIC | Facility: CLINIC | Age: 87
End: 2022-01-12

## 2022-01-12 NOTE — TELEPHONE ENCOUNTER
Myrtle Richardson verified, stated someone called regarding CT result. Informed her of message no further questions.

## 2022-01-17 NOTE — TELEPHONE ENCOUNTER
Spoke with patient's daughter Maribel Canchola (ZBIGNIEW on file). Informed her Dr. Arsalan Meyer result note below. Daughter verbalized understanding.

## 2022-01-27 RX ORDER — CYCLOBENZAPRINE HCL 10 MG
10 TABLET ORAL AS NEEDED
Qty: 60 TABLET | Refills: 3 | Status: SHIPPED | OUTPATIENT
Start: 2022-01-27

## 2022-01-28 NOTE — TELEPHONE ENCOUNTER
Please review refill protocol failed/ no protocol  Requested Prescriptions   Pending Prescriptions Disp Refills    CYCLOBENZAPRINE 10 MG Oral Tab [Pharmacy Med Name: CYCLOBENZAPRINE 10 MG TABLET] 60 tablet 3     Sig: TAKE 1 TABLET BY MOUTH TWICE A DAY AS N

## 2022-02-24 RX ORDER — LISINOPRIL 10 MG/1
10 TABLET ORAL DAILY
Qty: 90 TABLET | Refills: 1 | Status: SHIPPED | OUTPATIENT
Start: 2022-02-24 | End: 2022-08-18

## 2022-02-24 NOTE — TELEPHONE ENCOUNTER
Refill passed per [facility] protocol.       Requested Prescriptions   Pending Prescriptions Disp Refills    LISINOPRIL 10 MG Oral Tab [Pharmacy Med Name: LISINOPRIL 10 MG TABLET] 90 tablet 1     Sig: TAKE 1 TABLET BY MOUTH EVERY DAY        Hypertensive Medications Protocol Passed - 2/24/2022  1:16 AM        Passed - CMP or BMP in past 12 months        Passed - Appointment in past 6 or next 3 months        Passed - GFR Non- > 50     Lab Results   Component Value Date    GFRNAA 55 (L) 01/03/2022                        Future Appointments         Provider Department Appt Notes    In 3 weeks Royer Pierce MD [place] [employer] [facility], [address], [place]             Recent Outpatient Visits              3 months ago Essential hypertension with goal blood pressure less than 130/85    [facility], [address], Taylor Griffiths MD    Office Visit    6 months ago Abnormal thyroid function test    [facility] Endocrinology Ki Rosa MD    Office Visit    7 months ago Essential hypertension with goal blood pressure less than 130/85    Taylor Payne MD    Office Visit    10 months ago Subclinical hyperthyroidism    [facility] Endocrinology Ki Rosa MD    Office Visit    11 months ago Physical exam, annual    [address], Rubin Pugh MD    Office Visit

## 2022-02-26 RX ORDER — LEVOTHYROXINE SODIUM 0.05 MG/1
TABLET ORAL
Qty: 90 TABLET | Refills: 1 | OUTPATIENT
Start: 2022-02-26

## 2022-03-05 RX ORDER — LEVOTHYROXINE SODIUM 0.05 MG/1
TABLET ORAL
Qty: 90 TABLET | Refills: 1 | OUTPATIENT
Start: 2022-03-05

## 2022-03-05 NOTE — TELEPHONE ENCOUNTER
Please see my progress note. He was not taking thyroid supplementation. Please check with him to make sure this is true.   If it is, please inform pharmacy so that they will not be asking for refill request.  Thank you

## 2022-03-09 NOTE — TELEPHONE ENCOUNTER
I  Called the patient who stated he is taking the Levothyroxine 50 mcg and needs more. I confirmed with him several times.         LEVOTHYROXINE 50 MCG TABLET

## 2022-03-09 NOTE — TELEPHONE ENCOUNTER
Please forward this to endocrinology. Dr. Mauro Gutierrez is managing his thyroid disease.   Thank you

## 2022-03-11 NOTE — TELEPHONE ENCOUNTER
Call received from patient's daughter (ok per ZBIGNIEW) calling to follow up per patient's request.  This RN shared with caller that per documentation of Dr. Gerson Coley, patient should have discontinued levothyroxine (see 11/23/21 office visit note. Daughter indicates that patient is a good historian and confirmed with her that he has continued to take Levothyroxine. Patient has upcoming appt with Dr. Gerson Coley on 3/23. This RN did reinforce with caller, that per Dr. Adam Jeter note, patient should have discontinued Levothyroxine    Caller verbalizes understanding will review with patient.

## 2022-03-15 ENCOUNTER — TELEPHONE (OUTPATIENT)
Dept: ENDOCRINOLOGY CLINIC | Facility: CLINIC | Age: 87
End: 2022-03-15

## 2022-03-15 DIAGNOSIS — R94.6 ABNORMAL THYROID FUNCTION TEST: Primary | ICD-10-CM

## 2022-03-15 RX ORDER — LEVOTHYROXINE SODIUM 0.05 MG/1
TABLET ORAL
Qty: 90 TABLET | Refills: 1 | OUTPATIENT
Start: 2022-03-15

## 2022-03-15 NOTE — TELEPHONE ENCOUNTER
Dr. Laverne Subramanian    Per [facility] 8/30/21, patient is to not be on medication. Patient is overdue for an appointment. I am unsure if patient is following with PCP for thyroid. Please advise.

## 2022-03-15 NOTE — TELEPHONE ENCOUNTER
Dr. Leland Duran    Patient booked appt 3/23 and will get thyroid lab work done prior to appointment.

## 2022-03-15 NOTE — TELEPHONE ENCOUNTER
Pt daughter called in to get refill on levothyroxine I do not see it on the chart. Pt is out of the medication.  Please follow up

## 2022-03-15 NOTE — TELEPHONE ENCOUNTER
Spoke with daughter advise her of message regarding following up with Dr. Salome Varghese,  Will refuse medication

## 2022-03-15 NOTE — TELEPHONE ENCOUNTER
Hi!    Please reiterate to the patient's daughter that the patient is not supposed to be taking any thyroid medication. I would really like to see them both in follow up ASAP with blood tests that are in the computer. Ok to [employer]. Thank you!

## 2022-03-18 ENCOUNTER — LAB ENCOUNTER (OUTPATIENT)
Dept: LAB | Age: 87
End: 2022-03-18
Attending: INTERNAL MEDICINE
Payer: MEDICARE

## 2022-03-18 DIAGNOSIS — R94.6 ABNORMAL THYROID FUNCTION TEST: ICD-10-CM

## 2022-03-18 LAB
T4 FREE SERPL-MCNC: 0.8 NG/DL (ref 0.8–1.7)
TSI SER-ACNC: 6.53 MIU/ML (ref 0.36–3.74)

## 2022-03-18 PROCEDURE — 36415 COLL VENOUS BLD VENIPUNCTURE: CPT

## 2022-03-18 PROCEDURE — 84439 ASSAY OF FREE THYROXINE: CPT

## 2022-03-18 PROCEDURE — 84443 ASSAY THYROID STIM HORMONE: CPT

## 2022-03-22 ENCOUNTER — OFFICE VISIT (OUTPATIENT)
Dept: ENDOCRINOLOGY CLINIC | Facility: CLINIC | Age: 87
End: 2022-03-22
Payer: MEDICARE

## 2022-03-22 VITALS
SYSTOLIC BLOOD PRESSURE: 135 MMHG | DIASTOLIC BLOOD PRESSURE: 80 MMHG | BODY MASS INDEX: 30 KG/M2 | HEART RATE: 80 BPM | WEIGHT: 199 LBS

## 2022-03-22 DIAGNOSIS — E04.1 THYROID NODULE: ICD-10-CM

## 2022-03-22 DIAGNOSIS — E03.8 SUBCLINICAL HYPOTHYROIDISM: Primary | ICD-10-CM

## 2022-03-22 PROCEDURE — 99214 OFFICE O/P EST MOD 30 MIN: CPT | Performed by: INTERNAL MEDICINE

## 2022-03-23 ENCOUNTER — OFFICE VISIT (OUTPATIENT)
Dept: INTERNAL MEDICINE CLINIC | Facility: CLINIC | Age: 87
End: 2022-03-23
Payer: MEDICARE

## 2022-03-23 VITALS
RESPIRATION RATE: 16 BRPM | WEIGHT: 200.63 LBS | SYSTOLIC BLOOD PRESSURE: 132 MMHG | HEIGHT: 68 IN | DIASTOLIC BLOOD PRESSURE: 74 MMHG | HEART RATE: 72 BPM | BODY MASS INDEX: 30.41 KG/M2

## 2022-03-23 DIAGNOSIS — I10 ESSENTIAL HYPERTENSION WITH GOAL BLOOD PRESSURE LESS THAN 130/85: Primary | ICD-10-CM

## 2022-03-23 DIAGNOSIS — E11.3293 TYPE 2 DIABETES MELLITUS WITH BOTH EYES AFFECTED BY MILD NONPROLIFERATIVE RETINOPATHY WITHOUT MACULAR EDEMA, WITHOUT LONG-TERM CURRENT USE OF INSULIN (HCC): ICD-10-CM

## 2022-03-23 DIAGNOSIS — E78.2 MIXED HYPERLIPIDEMIA: ICD-10-CM

## 2022-03-23 PROCEDURE — 99214 OFFICE O/P EST MOD 30 MIN: CPT | Performed by: INTERNAL MEDICINE

## 2022-03-23 RX ORDER — SIMVASTATIN 20 MG
20 TABLET ORAL NIGHTLY
Qty: 90 TABLET | Refills: 1 | Status: SHIPPED | OUTPATIENT
Start: 2022-03-23

## 2022-03-23 RX ORDER — GLIMEPIRIDE 2 MG/1
2 TABLET ORAL
Qty: 90 TABLET | Refills: 1 | Status: SHIPPED | OUTPATIENT
Start: 2022-03-23

## 2022-03-28 ENCOUNTER — LAB ENCOUNTER (OUTPATIENT)
Dept: LAB | Age: 87
End: 2022-03-28
Attending: INTERNAL MEDICINE
Payer: MEDICARE

## 2022-03-28 DIAGNOSIS — E11.3293 TYPE 2 DIABETES MELLITUS WITH BOTH EYES AFFECTED BY MILD NONPROLIFERATIVE RETINOPATHY WITHOUT MACULAR EDEMA, WITHOUT LONG-TERM CURRENT USE OF INSULIN (HCC): ICD-10-CM

## 2022-03-28 LAB
ALBUMIN SERPL-MCNC: 4 G/DL (ref 3.4–5)
ALBUMIN/GLOB SERPL: 1.4 {RATIO} (ref 1–2)
ALP LIVER SERPL-CCNC: 100 U/L
ALT SERPL-CCNC: 26 U/L
ANION GAP SERPL CALC-SCNC: 6 MMOL/L (ref 0–18)
AST SERPL-CCNC: 15 U/L (ref 15–37)
BILIRUB SERPL-MCNC: 0.3 MG/DL (ref 0.1–2)
BUN BLD-MCNC: 26 MG/DL (ref 7–18)
BUN/CREAT SERPL: 17.8 (ref 10–20)
CALCIUM BLD-MCNC: 9.6 MG/DL (ref 8.5–10.1)
CHLORIDE SERPL-SCNC: 106 MMOL/L (ref 98–112)
CHOLEST SERPL-MCNC: 145 MG/DL (ref ?–200)
CO2 SERPL-SCNC: 26 MMOL/L (ref 21–32)
CREAT BLD-MCNC: 1.46 MG/DL
CREAT UR-SCNC: 109 MG/DL
DEPRECATED RDW RBC AUTO: 44.1 FL (ref 35.1–46.3)
ERYTHROCYTE [DISTWIDTH] IN BLOOD BY AUTOMATED COUNT: 13.7 % (ref 11–15)
EST. AVERAGE GLUCOSE BLD GHB EST-MCNC: 163 MG/DL (ref 68–126)
FASTING PATIENT LIPID ANSWER: YES
FASTING STATUS PATIENT QL REPORTED: YES
GLOBULIN PLAS-MCNC: 2.9 G/DL (ref 2.8–4.4)
GLUCOSE BLD-MCNC: 186 MG/DL (ref 70–99)
HBA1C MFR BLD: 7.3 % (ref ?–5.7)
HCT VFR BLD AUTO: 38.9 %
HDLC SERPL-MCNC: 34 MG/DL (ref 40–59)
HGB BLD-MCNC: 12.1 G/DL
LDLC SERPL CALC-MCNC: 83 MG/DL (ref ?–100)
MCH RBC QN AUTO: 27.5 PG (ref 26–34)
MCHC RBC AUTO-ENTMCNC: 31.1 G/DL (ref 31–37)
MCV RBC AUTO: 88.4 FL
MICROALBUMIN UR-MCNC: 3.68 MG/DL
MICROALBUMIN/CREAT 24H UR-RTO: 33.8 UG/MG (ref ?–30)
NONHDLC SERPL-MCNC: 111 MG/DL (ref ?–130)
OSMOLALITY SERPL CALC.SUM OF ELEC: 296 MOSM/KG (ref 275–295)
PLATELET # BLD AUTO: 208 10(3)UL (ref 150–450)
POTASSIUM SERPL-SCNC: 4.7 MMOL/L (ref 3.5–5.1)
PROT SERPL-MCNC: 6.9 G/DL (ref 6.4–8.2)
RBC # BLD AUTO: 4.4 X10(6)UL
SODIUM SERPL-SCNC: 138 MMOL/L (ref 136–145)
TRIGL SERPL-MCNC: 160 MG/DL (ref 30–149)
VLDLC SERPL CALC-MCNC: 25 MG/DL (ref 0–30)
WBC # BLD AUTO: 6.8 X10(3) UL (ref 4–11)

## 2022-03-28 PROCEDURE — 83036 HEMOGLOBIN GLYCOSYLATED A1C: CPT

## 2022-03-28 PROCEDURE — 85027 COMPLETE CBC AUTOMATED: CPT

## 2022-03-28 PROCEDURE — 80053 COMPREHEN METABOLIC PANEL: CPT

## 2022-03-28 PROCEDURE — 80061 LIPID PANEL: CPT

## 2022-03-28 PROCEDURE — 82570 ASSAY OF URINE CREATININE: CPT

## 2022-03-28 PROCEDURE — 36415 COLL VENOUS BLD VENIPUNCTURE: CPT

## 2022-03-28 PROCEDURE — 82043 UR ALBUMIN QUANTITATIVE: CPT

## 2022-05-26 RX ORDER — CYCLOBENZAPRINE HCL 10 MG
10 TABLET ORAL AS NEEDED
Qty: 60 TABLET | Refills: 3 | Status: SHIPPED | OUTPATIENT
Start: 2022-05-26 | End: 2022-05-27

## 2022-05-26 NOTE — TELEPHONE ENCOUNTER
The daughter Imelda Caicedo was informed. Per the daughter he has to take 2 times a day or his has pain in his legs. He takes the Cyclobenzaprine 2 times a day.

## 2022-05-26 NOTE — TELEPHONE ENCOUNTER
I have approved the medication. Please inform patient/family that they do not need to take cyclobenzaprine on a daily basis. They can take it on an as needed basis for muscle spasm.   Thank you

## 2022-05-26 NOTE — TELEPHONE ENCOUNTER
Daughter states her father is now out of Cyclobenzaprine 10 mg.   States he takes one in am and one in pm.

## 2022-05-27 RX ORDER — CYCLOBENZAPRINE HCL 10 MG
10 TABLET ORAL 2 TIMES DAILY PRN
Qty: 60 TABLET | Refills: 3 | Status: SHIPPED | OUTPATIENT
Start: 2022-05-27

## 2022-05-27 NOTE — TELEPHONE ENCOUNTER
Patient's daughter called back states patient has to take cyclobenzaprine 2 times a day or he gets pain in his legs. Daughter states patient was unable to  medication, too early to fill until May 29. Spoke to [facility] pharmacy prescription does not states to take twice a day as needed for muscle spasms. New script would need to be sent in for patient to fill sooner.

## 2022-06-23 ENCOUNTER — OFFICE VISIT (OUTPATIENT)
Dept: INTERNAL MEDICINE CLINIC | Facility: CLINIC | Age: 87
End: 2022-06-23
Payer: MEDICARE

## 2022-06-23 VITALS
RESPIRATION RATE: 14 BRPM | BODY MASS INDEX: 30.31 KG/M2 | OXYGEN SATURATION: 98 % | HEIGHT: 68 IN | SYSTOLIC BLOOD PRESSURE: 116 MMHG | DIASTOLIC BLOOD PRESSURE: 70 MMHG | WEIGHT: 200 LBS | HEART RATE: 72 BPM

## 2022-06-23 DIAGNOSIS — E78.2 MIXED HYPERLIPIDEMIA: ICD-10-CM

## 2022-06-23 DIAGNOSIS — E11.3293 TYPE 2 DIABETES MELLITUS WITH BOTH EYES AFFECTED BY MILD NONPROLIFERATIVE RETINOPATHY WITHOUT MACULAR EDEMA, WITHOUT LONG-TERM CURRENT USE OF INSULIN (HCC): ICD-10-CM

## 2022-06-23 DIAGNOSIS — N18.2 STAGE 2 CHRONIC KIDNEY DISEASE: ICD-10-CM

## 2022-06-23 DIAGNOSIS — I10 ESSENTIAL HYPERTENSION WITH GOAL BLOOD PRESSURE LESS THAN 130/85: Primary | ICD-10-CM

## 2022-06-23 PROCEDURE — 99214 OFFICE O/P EST MOD 30 MIN: CPT | Performed by: INTERNAL MEDICINE

## 2022-06-23 PROCEDURE — 1126F AMNT PAIN NOTED NONE PRSNT: CPT | Performed by: INTERNAL MEDICINE

## 2022-06-23 NOTE — PROGRESS NOTES
Per patient he saw  Noris Lawton in [place] around 2/2022 for his Dilated Eye Exam,will obtain report.

## 2022-08-15 RX ORDER — BLOOD-GLUCOSE METER
KIT MISCELLANEOUS
Qty: 100 STRIP | Refills: 3 | Status: SHIPPED | OUTPATIENT
Start: 2022-08-15

## 2022-08-18 RX ORDER — LISINOPRIL 10 MG/1
TABLET ORAL
Qty: 90 TABLET | Refills: 1 | Status: SHIPPED | OUTPATIENT
Start: 2022-08-18

## 2022-09-07 ENCOUNTER — TELEPHONE (OUTPATIENT)
Dept: INTERNAL MEDICINE CLINIC | Facility: CLINIC | Age: 87
End: 2022-09-07

## 2022-09-07 RX ORDER — BLOOD-GLUCOSE METER
1 KIT MISCELLANEOUS DAILY
Qty: 100 STRIP | Refills: 3 | Status: SHIPPED | OUTPATIENT
Start: 2022-09-07

## 2022-09-07 NOTE — TELEPHONE ENCOUNTER
Spoke to patient's daughter,  (on ZBIGNIEW, verified patient's name and ). She asked about the diabetic testing strips. Relayed that they were sent to [facility] in [place] on 8/15/22 for 100 strips with 3 refills. She will check with the pharmacy and call back with any concerns.

## 2022-09-07 NOTE — TELEPHONE ENCOUNTER
Patient  Daughter calling ( identified name and  ) she was able to call [facility] and   they have the RX  but need DX code     Will re-send the RX

## 2022-09-22 DIAGNOSIS — E11.3293 TYPE 2 DIABETES MELLITUS WITH BOTH EYES AFFECTED BY MILD NONPROLIFERATIVE RETINOPATHY WITHOUT MACULAR EDEMA, WITHOUT LONG-TERM CURRENT USE OF INSULIN (HCC): ICD-10-CM

## 2022-09-22 RX ORDER — GLIMEPIRIDE 2 MG/1
TABLET ORAL
Qty: 90 TABLET | Refills: 1 | Status: SHIPPED | OUTPATIENT
Start: 2022-09-22

## 2022-09-22 NOTE — TELEPHONE ENCOUNTER
Please review. Protocol Failed or has No Protocol.     Requested Prescriptions   Pending Prescriptions Disp Refills    GLIMEPIRIDE 2 MG Oral Tab [Pharmacy Med Name: GLIMEPIRIDE 2 MG TABLET] 90 tablet 1     Sig: TAKE 1 TABLET BY MOUTH DAILY WITH BREAKFAST        Diabetes Medication Protocol Failed - 9/22/2022 12:02 AM        Failed - EGFRCR or GFRNAA > 50     GFR Evaluation  GFRNAA: 43 , resulted on 3/28/2022            Passed - Last A1C < 7.5 and within past 6 months     Lab Results   Component Value Date    A1C 7.3 (H) 03/28/2022               Passed - In person appointment or virtual visit in the past 6 mos or appointment in next 3 mos       Recent Outpatient Visits              3 months ago Essential hypertension with goal blood pressure less than 130/85    [facility], [address], Faisal Irizarry MD    Office Visit    6 months ago Essential hypertension with goal blood pressure less than 130/85    [facility], [address], Erin Miller MD    Office Visit    6 months ago Subclinical hypothyroidism    [facility] Endocrinology Chiquita Rubin MD    Office Visit    10 months ago Essential hypertension with goal blood pressure less than 130/85    [facility], Erin Miller MD    Office Visit    1 year ago Abnormal thyroid function test    [place] [employer] [facility] Endocrinology Chiquita Rubin MD    Office Visit     Future Appointments         Provider Department Appt Notes    In 1 month Hettie Kanner, MD [place] [employer] [facility], [address], [place] 4 mo [facility] Px               Passed - GFR in the past 12 months           METFORMIN 500 MG Oral Tab [Pharmacy Med Name: METFORMIN  MG TABLET] 360 tablet 1     Sig: TAKE 2 TABLETS BY MOUTH TWICE A DAY WITH MEALS        Diabetes Medication Protocol Failed - 9/22/2022 12:02 AM        Failed - EGFRCR or GFRNAA > 50     GFR Evaluation  GFRNAA: 43 , resulted on 3/28/2022            Passed - Last A1C < 7.5 and within past 6 months     Lab Results   Component Value Date    A1C 7.3 (H) 03/28/2022               Passed - In person appointment or virtual visit in the past 6 mos or appointment in next 3 mos       Recent Outpatient Visits              3 months ago Essential hypertension with goal blood pressure less than 130/85    [place] [employer] [facility], [address], Edvin Irizarry MD    Office Visit    6 months ago Essential hypertension with goal blood pressure less than 130/85    [place] [employer] [facility], [address], Debbra Gaucher, MD    Office Visit    6 months ago Subclinical hypothyroidism    [facility] Endocrinology Pratima Garcia MD    Office Visit    10 months ago Essential hypertension with goal blood pressure less than 130/85    [place] [employer] [facility], [address], Debbra Gaucher, MD    Office Visit    1 year ago Abnormal thyroid function test    [place] [employer] [facility] Endocrinology Pratima Garcia MD    Office Visit     Future Appointments         Provider Department Appt Notes    In 1 month Sonia Arevalo MD [place] [employer] [facility], [address] 4 mo [facility] Px               Passed - GFR in the past 12 months              Future Appointments         Provider Department Appt Notes    In 1 month Sonia Arevalo MD [place] [employer] [facility], [address] 4 mo [facility]x            Recent Outpatient Visits              3 months ago Essential hypertension with goal blood pressure less than 130/85    [place] [employer] [facility], [address], Debbra Gaucher, MD    Office Visit    6 months ago Essential hypertension with goal blood pressure less than 130/85    [place] [employer] [facility], [address], Debbra Gaucher, MD    Office Visit    6 months ago Subclinical hypothyroidism    [place] [employer] [facility] Endocrinology Pratima Garcia MD    Office Visit    10 months ago Essential hypertension with goal blood pressure less than 130/85    [facility], Debbra Gaucher, MD    Office Visit    1 year ago Abnormal thyroid function test    [facility] Endocrinology Magdalena Burns MD    Office Visit

## 2022-09-23 ENCOUNTER — LAB ENCOUNTER (OUTPATIENT)
Dept: LAB | Age: 87
End: 2022-09-23
Attending: INTERNAL MEDICINE

## 2022-09-23 DIAGNOSIS — I10 ESSENTIAL HYPERTENSION WITH GOAL BLOOD PRESSURE LESS THAN 130/85: ICD-10-CM

## 2022-09-23 DIAGNOSIS — E03.8 SUBCLINICAL HYPOTHYROIDISM: ICD-10-CM

## 2022-09-23 LAB
ANION GAP SERPL CALC-SCNC: 6 MMOL/L (ref 0–18)
BUN BLD-MCNC: 26 MG/DL (ref 7–18)
BUN/CREAT SERPL: 21.8 (ref 10–20)
CALCIUM BLD-MCNC: 9.1 MG/DL (ref 8.5–10.1)
CHLORIDE SERPL-SCNC: 109 MMOL/L (ref 98–112)
CO2 SERPL-SCNC: 23 MMOL/L (ref 21–32)
CREAT BLD-MCNC: 1.19 MG/DL
FASTING STATUS PATIENT QL REPORTED: YES
GFR SERPLBLD BASED ON 1.73 SQ M-ARVRAT: 60 ML/MIN/1.73M2 (ref 60–?)
GLUCOSE BLD-MCNC: 164 MG/DL (ref 70–99)
OSMOLALITY SERPL CALC.SUM OF ELEC: 294 MOSM/KG (ref 275–295)
POTASSIUM SERPL-SCNC: 4.9 MMOL/L (ref 3.5–5.1)
SODIUM SERPL-SCNC: 138 MMOL/L (ref 136–145)
T4 FREE SERPL-MCNC: 0.7 NG/DL (ref 0.8–1.7)
TSI SER-ACNC: 9.67 MIU/ML (ref 0.36–3.74)

## 2022-09-23 PROCEDURE — 36415 COLL VENOUS BLD VENIPUNCTURE: CPT

## 2022-09-23 PROCEDURE — 84439 ASSAY OF FREE THYROXINE: CPT

## 2022-09-23 PROCEDURE — 80048 BASIC METABOLIC PNL TOTAL CA: CPT

## 2022-09-23 PROCEDURE — 84443 ASSAY THYROID STIM HORMONE: CPT

## 2022-10-02 ENCOUNTER — TELEPHONE (OUTPATIENT)
Dept: ENDOCRINOLOGY CLINIC | Facility: CLINIC | Age: 87
End: 2022-10-02

## 2022-10-02 DIAGNOSIS — E03.9 HYPOTHYROIDISM, UNSPECIFIED TYPE: Primary | ICD-10-CM

## 2022-10-02 RX ORDER — LEVOTHYROXINE SODIUM 0.03 MG/1
25 TABLET ORAL
Qty: 90 TABLET | Refills: 0 | Status: SHIPPED | OUTPATIENT
Start: 2022-10-02 | End: 2022-12-31

## 2022-10-02 NOTE — TELEPHONE ENCOUNTER
Hi Dr. Jean Claude Yao!  I agree with you that it would be a good idea to start Mr. Analy Souza on a small dose of levothyroxine now. Thank you!

## 2022-10-02 NOTE — TELEPHONE ENCOUNTER
Hi!    Please contact patient and let hin know that I have reviewed his thyroid function tests and that I would like to start him on levothyroxine 25mcg PO qday and see him in the clinic in 3 months with repeat thyroid function tests. Thank you!

## 2022-11-07 ENCOUNTER — TELEPHONE (OUTPATIENT)
Dept: INTERNAL MEDICINE CLINIC | Facility: CLINIC | Age: 87
End: 2022-11-07

## 2022-11-11 DIAGNOSIS — E11.3293 TYPE 2 DIABETES MELLITUS WITH BOTH EYES AFFECTED BY MILD NONPROLIFERATIVE RETINOPATHY WITHOUT MACULAR EDEMA, WITHOUT LONG-TERM CURRENT USE OF INSULIN (HCC): ICD-10-CM

## 2022-11-11 RX ORDER — SIMVASTATIN 20 MG
TABLET ORAL
Qty: 90 TABLET | Refills: 1 | Status: SHIPPED | OUTPATIENT
Start: 2022-11-11

## 2022-11-11 NOTE — TELEPHONE ENCOUNTER
Refill passed per [address] protocol    Requested Prescriptions   Pending Prescriptions Disp Refills    SIMVASTATIN 20 MG Oral Tab [Pharmacy Med Name: SIMVASTATIN 20 MG TABLET] 90 tablet 1     Sig: TAKE 1 TABLET BY MOUTH EVERY DAY AT NIGHT       Cholesterol Medication Protocol Passed - 11/11/2022  1:49 AM        Passed - ALT in past 12 months        Passed - LDL in past 12 months        Passed - Last ALT < 80     Lab Results   Component Value Date    ALT 26 03/28/2022             Passed - Last LDL < 130     Lab Results   Component Value Date    LDL 83 03/28/2022             Passed - In person appointment or virtual visit in the past 12 mos or appointment in next 3 mos     Recent Outpatient Visits              4 months ago Essential hypertension with goal blood pressure less than 130/85    [address], [address], Shelia Mendoza MD    Office Visit    7 months ago Essential hypertension with goal blood pressure less than 130/85    [facility], Shelia Mendoza MD    Office Visit    7 months ago Subclinical hypothyroidism    [address] [facility] Endocrinology Jian Landry MD    Office Visit    11 months ago Essential hypertension with goal blood pressure less than 130/85    [address] [facility], [address], Shelia Mendoza MD    Office Visit    1 year ago Abnormal thyroid function test    [address] [facility] Endocrinology Jian Landry MD    Office Visit          Future Appointments         Provider Department Appt Notes    In 1 month Stanton Silva MD [address], [address] 4 mo [facility] Px  policy inform                    Future Appointments         Provider Department Appt Notes    In 1 month Stanton Silva MD [address], [address] 4 mo [facility]  policy inform            Recent Outpatient Visits              4 months ago Essential hypertension with goal blood pressure less than 130/85    [address] [facility], [address], Shelia Mendoza MD    Office Visit    7 months ago Essential hypertension with goal blood pressure less than 130/85    [facility] [place], Shelia Mendoza MD    Office Visit    7 months ago Subclinical hypothyroidism    [facility] Endocrinology Jian Landry MD    Office Visit    11 months ago Essential hypertension with goal blood pressure less than 130/85    [facility] Gonzalez, Shelia Mendoza MD    Office Visit    1 year ago Abnormal thyroid function test    [facility] Endocrinology Jian Landry MD    Office Visit

## 2022-12-03 DIAGNOSIS — E03.9 HYPOTHYROIDISM, UNSPECIFIED TYPE: ICD-10-CM

## 2022-12-05 RX ORDER — LEVOTHYROXINE SODIUM 0.03 MG/1
TABLET ORAL
Qty: 90 TABLET | Refills: 0 | Status: SHIPPED | OUTPATIENT
Start: 2022-12-05

## 2022-12-05 NOTE — TELEPHONE ENCOUNTER
LOV: 3/22/22    RTC 3 months from 10/02/22 TE w/ repeat labs  Called daughter Oral Downs on ZBIGNIEW and discussed follow up appointment and labs. Made appointment as below and to have patient complete labs prior to appointment. She voiced understanding and denied further questions at this time. Refilled per protocol. Future Appointments   Date Time Provider Nacho Ahn   1/31/2023  1:30 PM Ellen Mercedes MD [address] [facility]     Appointment letter mailed including new information regarding new location.

## 2022-12-22 ENCOUNTER — OFFICE VISIT (OUTPATIENT)
Facility: CLINIC | Age: 87
End: 2022-12-22
Payer: MEDICARE

## 2022-12-22 VITALS
DIASTOLIC BLOOD PRESSURE: 60 MMHG | HEART RATE: 88 BPM | HEIGHT: 68 IN | RESPIRATION RATE: 14 BRPM | OXYGEN SATURATION: 98 % | BODY MASS INDEX: 30.31 KG/M2 | SYSTOLIC BLOOD PRESSURE: 110 MMHG | WEIGHT: 200 LBS

## 2022-12-22 DIAGNOSIS — M47.816 LUMBAR SPONDYLOSIS: ICD-10-CM

## 2022-12-22 DIAGNOSIS — E78.2 MIXED HYPERLIPIDEMIA: ICD-10-CM

## 2022-12-22 DIAGNOSIS — M51.36 BULGE OF LUMBAR DISC WITHOUT MYELOPATHY: ICD-10-CM

## 2022-12-22 DIAGNOSIS — F17.200 TOBACCO USE DISORDER: ICD-10-CM

## 2022-12-22 DIAGNOSIS — R94.6 ABNORMAL THYROID FUNCTION TEST: ICD-10-CM

## 2022-12-22 DIAGNOSIS — E11.3293 TYPE 2 DIABETES MELLITUS WITH BOTH EYES AFFECTED BY MILD NONPROLIFERATIVE RETINOPATHY WITHOUT MACULAR EDEMA, WITHOUT LONG-TERM CURRENT USE OF INSULIN (HCC): ICD-10-CM

## 2022-12-22 DIAGNOSIS — M51.16 LUMBAR DISC HERNIATION WITH RADICULOPATHY: ICD-10-CM

## 2022-12-22 DIAGNOSIS — M48.061 LUMBAR FORAMINAL STENOSIS: ICD-10-CM

## 2022-12-22 DIAGNOSIS — M51.37 DDD (DEGENERATIVE DISC DISEASE), LUMBOSACRAL: ICD-10-CM

## 2022-12-22 DIAGNOSIS — H81.10 BENIGN PAROXYSMAL POSITIONAL VERTIGO, UNSPECIFIED LATERALITY: ICD-10-CM

## 2022-12-22 DIAGNOSIS — M48.062 SPINAL STENOSIS OF LUMBAR REGION WITH NEUROGENIC CLAUDICATION: ICD-10-CM

## 2022-12-22 DIAGNOSIS — E03.8 SUBCLINICAL HYPOTHYROIDISM: ICD-10-CM

## 2022-12-22 DIAGNOSIS — Z98.890 S/P LUMBAR LAMINECTOMY: ICD-10-CM

## 2022-12-22 DIAGNOSIS — M47.816 FACET SYNDROME, LUMBAR: ICD-10-CM

## 2022-12-22 DIAGNOSIS — I10 ESSENTIAL HYPERTENSION WITH GOAL BLOOD PRESSURE LESS THAN 130/85: Primary | ICD-10-CM

## 2022-12-22 DIAGNOSIS — E04.1 THYROID NODULE: ICD-10-CM

## 2022-12-22 DIAGNOSIS — R91.1 LUNG NODULE: ICD-10-CM

## 2022-12-22 PROBLEM — S76.019A STRAIN OF GLUTEUS MEDIUS: Status: RESOLVED | Noted: 2019-11-05 | Resolved: 2022-12-22

## 2022-12-22 PROBLEM — E03.9 ACQUIRED HYPOTHYROIDISM: Status: RESOLVED | Noted: 2019-06-03 | Resolved: 2022-12-22

## 2022-12-22 PROBLEM — E05.90 SUBCLINICAL HYPERTHYROIDISM: Status: RESOLVED | Noted: 2021-04-05 | Resolved: 2022-12-22

## 2022-12-22 PROBLEM — M54.41 ACUTE RIGHT-SIDED LOW BACK PAIN WITH RIGHT-SIDED SCIATICA: Status: RESOLVED | Noted: 2019-10-15 | Resolved: 2022-12-22

## 2022-12-22 PROBLEM — M54.59 LUMBAR TRIGGER POINT SYNDROME: Status: RESOLVED | Noted: 2019-11-05 | Resolved: 2022-12-22

## 2022-12-22 PROBLEM — M70.61 GREATER TROCHANTERIC BURSITIS OF BOTH HIPS: Status: RESOLVED | Noted: 2019-11-05 | Resolved: 2022-12-22

## 2022-12-22 PROBLEM — M70.62 GREATER TROCHANTERIC BURSITIS OF BOTH HIPS: Status: RESOLVED | Noted: 2019-11-05 | Resolved: 2022-12-22

## 2022-12-22 PROBLEM — M54.16 LUMBAR RADICULOPATHY, ACUTE: Status: RESOLVED | Noted: 2019-11-21 | Resolved: 2022-12-22

## 2022-12-22 PROCEDURE — G0439 PPPS, SUBSEQ VISIT: HCPCS | Performed by: INTERNAL MEDICINE

## 2022-12-22 PROCEDURE — 1126F AMNT PAIN NOTED NONE PRSNT: CPT | Performed by: INTERNAL MEDICINE

## 2023-01-27 ENCOUNTER — LAB ENCOUNTER (OUTPATIENT)
Dept: LAB | Age: 88
End: 2023-01-27
Attending: INTERNAL MEDICINE
Payer: MEDICARE

## 2023-01-27 DIAGNOSIS — E03.9 HYPOTHYROIDISM, UNSPECIFIED TYPE: ICD-10-CM

## 2023-01-27 LAB
T4 FREE SERPL-MCNC: 0.8 NG/DL (ref 0.8–1.7)
TSI SER-ACNC: 9.78 MIU/ML (ref 0.36–3.74)

## 2023-01-27 PROCEDURE — 84443 ASSAY THYROID STIM HORMONE: CPT

## 2023-01-27 PROCEDURE — 36415 COLL VENOUS BLD VENIPUNCTURE: CPT

## 2023-01-27 PROCEDURE — 84439 ASSAY OF FREE THYROXINE: CPT

## 2023-01-31 ENCOUNTER — OFFICE VISIT (OUTPATIENT)
Dept: ENDOCRINOLOGY CLINIC | Facility: CLINIC | Age: 88
End: 2023-01-31

## 2023-01-31 VITALS
WEIGHT: 205 LBS | HEART RATE: 82 BPM | DIASTOLIC BLOOD PRESSURE: 66 MMHG | SYSTOLIC BLOOD PRESSURE: 135 MMHG | BODY MASS INDEX: 31 KG/M2

## 2023-01-31 DIAGNOSIS — E03.9 HYPOTHYROIDISM, UNSPECIFIED TYPE: Primary | ICD-10-CM

## 2023-01-31 PROCEDURE — 99214 OFFICE O/P EST MOD 30 MIN: CPT | Performed by: INTERNAL MEDICINE

## 2023-01-31 RX ORDER — LEVOTHYROXINE SODIUM 0.05 MG/1
50 TABLET ORAL
Qty: 90 TABLET | Refills: 0 | Status: SHIPPED | OUTPATIENT
Start: 2023-01-31 | End: 2023-05-01

## 2023-01-31 NOTE — PATIENT INSTRUCTIONS
Patient's TSH is 9.780 and has only improved slightly in 4 months. I would like to increase the dose from 25mcg to 50mcg. He should have a repeat TSH and FT4 in 3 months and return to clinic in 6 months.

## 2023-03-10 RX ORDER — LISINOPRIL 10 MG/1
TABLET ORAL
Qty: 90 TABLET | Refills: 3 | Status: SHIPPED | OUTPATIENT
Start: 2023-03-10

## 2023-03-10 NOTE — TELEPHONE ENCOUNTER
Refill passed per [employer] protocol.   Requested Prescriptions   Pending Prescriptions Disp Refills    LISINOPRIL 10 MG Oral Tab [Pharmacy Med Name: LISINOPRIL 10 MG TABLET] 90 tablet 1     Sig: TAKE 1 TABLET BY MOUTH EVERY DAY       Hypertensive Medications Protocol Passed - 3/10/2023  3:29 PM        Passed - In person appointment in the past 12 or next 3 months     Recent Outpatient Visits              1 month ago Hypothyroidism, unspecified type    [facility], [address] [facility], Heriberto Monteiro MD    Office Visit    2 months ago Essential hypertension with goal blood pressure less than 130/85    [facility], [address] [facility], MD Marielena    Office Visit    8 months ago Essential hypertension with goal blood pressure less than 130/85    [facility], [address] [facility] [address], MD Marielena    Office Visit    11 months ago Essential hypertension with goal blood pressure less than 130/85    [facility], [address] [facility], [place] Marielena    Office Visit    11 months ago Subclinical hypothyroidism    [facility] [place] Matthew andres MD    Office Visit          Future Appointments         Provider Department Appt Notes    In 1 week MD Edvin Rosado [place] 3 month follow up               Passed - Last BP reading less than 140/90     BP Readings from Last 1 Encounters:  01/31/23 : 135/66              Passed - CMP or BMP in past 6 months     Recent Results (from the past 4392 hour(s))   BASIC METABOLIC PANEL (8)    Collection Time: 09/23/22  8:43 AM   Result Value Ref Range    Glucose 164 (H) 70 - 99 mg/dL    Sodium 138 136 - 145 mmol/L    Potassium 4.9 3.5 - 5.1 mmol/L    Chloride 109 98 - 112 mmol/L    CO2 23.0 21.0 - 32.0 mmol/L    Anion Gap 6 0 - 18 mmol/L    BUN 26 (H) 7 - 18 mg/dL    Creatinine 1.19 0.70 - 1.30 mg/dL    BUN/CREA Ratio 21.8 (H) 10.0 - 20.0    Calcium, Total 9.1 8.5 - 10.1 mg/dL    Calculated Osmolality 294 275 - 295 mOsm/kg    eGFR-Cr 60 >=60 mL/min/1.73m2    Patient Fasting for BMP? Yes      *Note: Due to a large number of results and/or encounters for the requested time period, some results have not been displayed. A complete set of results can be found in Results Review.                Passed - In person appointment or virtual visit in the past 6 months     Recent Outpatient Visits              1 month ago Hypothyroidism, unspecified type    [facility], [address] [place] [address], Jeaneth Quinn MD    Office Visit    2 months ago Essential hypertension with goal blood pressure less than 130/85    [facility], [address] [place] [address], MD Marielena    Office Visit    8 months ago Essential hypertension with goal blood pressure less than 130/85    [facility], [address] [place] [address],[address], MD Marielean    Office Visit    11 months ago Essential hypertension with goal blood pressure less than 130/85    Marielena Bundy MD    Office Visit    11 months ago Subclinical hypothyroidism    Kaylie Doss [place]    Office Visit          Future Appointments         Provider Department Appt Notes    In 1 week Deric Naidu MD [address], [address] [place] [address] 3 month follow up               Passed [facility] or GFRNAA > 50     GFR Evaluation  EGFRCR: 60 , resulted on 9/23/2022

## 2023-03-24 DIAGNOSIS — E11.3293 TYPE 2 DIABETES MELLITUS WITH BOTH EYES AFFECTED BY MILD NONPROLIFERATIVE RETINOPATHY WITHOUT MACULAR EDEMA, WITHOUT LONG-TERM CURRENT USE OF INSULIN (HCC): ICD-10-CM

## 2023-03-24 RX ORDER — GLIMEPIRIDE 2 MG/1
2 TABLET ORAL
Qty: 90 TABLET | Refills: 3 | Status: SHIPPED | OUTPATIENT
Start: 2023-03-24

## 2023-03-24 NOTE — TELEPHONE ENCOUNTER
Please review. Protocol failed / No protocol.    Requested Prescriptions   Pending Prescriptions Disp Refills    GLIMEPIRIDE 2 MG Oral Tab [Pharmacy Med Name: GLIMEPIRIDE 2 MG TABLET] 90 tablet 1     Sig: TAKE 1 TABLET BY MOUTH EVERY DAY WITH BREAKFAST       Diabetes Medication Protocol Failed - 3/24/2023 12:09 AM        Failed - Last A1C < 7.5 and within past 6 months     Lab Results   Component Value Date    A1C 7.3 (H) 03/28/2022             Passed - In person appointment or virtual visit in the past 6 mos or appointment in next 3 mos     Recent Outpatient Visits              1 month ago Hypothyroidism, unspecified type    [facility], [address] [place] [address], Alix Orellana MD    Office Visit    3 months ago Essential hypertension with goal blood pressure less than 130/85    [facility], [address] [place] [address], Faviola العلي MD    Office Visit    9 months ago Essential hypertension with goal blood pressure less than 130/85    Faviola Kurtz MD    Office Visit    1 year ago Essential hypertension with goal blood pressure less than 130/85    Faviola Kurtz MD    Office Visit    1 year ago Subclinical hypothyroidism    [address], [address] [facility],[address], Elmer Parker MD    Office Visit                      Passed - EGFRCR or GFRNAA > 50     GFR Evaluation  EGFRCR: 60 , resulted on 9/23/2022          Passed - GFR in the past 12 months          METFORMIN 500 MG Oral Tab [Pharmacy Med Name: METFORMIN  MG TABLET] 360 tablet 1     Sig: TAKE 2 TABLETS BY MOUTH TWICE A DAY WITH MEALS       Diabetes Medication Protocol Failed - 3/24/2023 12:09 AM        Failed - Last A1C < 7.5 and within past 6 months     Lab Results   Component Value Date    A1C 7.3 (H) 03/28/2022             Passed - In person appointment or virtual visit in the past 6 mos or appointment in next 3 mos     Recent Outpatient Visits              1 month ago Hypothyroidism, unspecified type    [facility], [address] [place] [address], Bharat Stokes MD    Office Visit    3 months ago Essential hypertension with goal blood pressure less than 130/85    [facility], [address] [place] [address], Donato Choi MD    Office Visit    9 months ago Essential hypertension with goal blood pressure less than 130/85    [facility], [address], Donato Choi MD    Office Visit    1 year ago Essential hypertension with goal blood pressure less than 130/85    [facility], [address], Donato Choi MD    Office Visit    1 year ago Subclinical hypothyroidism    [facility], [address], [place], Marnie garcia MD    Office Visit                      Passed - EGFRCR or GFRNAA > 50     GFR Evaluation  EGFRCR: 60 , resulted on 9/23/2022          Passed - GFR in the past 12 months            Recent Outpatient Visits              1 month ago Hypothyroidism, unspecified type    [facility], [address] [place] [address], [place], Marnie garcia MD    Office Visit    3 months ago Essential hypertension with goal blood pressure less than 130/85    [facility], [address] [place] [address], Donato Choi MD    Office Visit    9 months ago Essential hypertension with goal blood pressure less than 130/85    [facility], [address], Donato Choi MD    Office Visit    1 year ago Essential hypertension with goal blood pressure less than 130/85    Donato Mcelroy MD    Office Visit    1 year ago Subclinical hypothyroidism    Bharat Mcelroy MD    Office Visit

## 2023-04-06 NOTE — TELEPHONE ENCOUNTER
Please review refill protocol failed/ no protocol  Requested Prescriptions   Pending Prescriptions Disp Refills    CYCLOBENZAPRINE 10 MG Oral Tab [Pharmacy Med Name: CYCLOBENZAPRINE 10 MG TABLET] 60 tablet 3     Sig: TAKE 1 TABLET BY MOUTH TWICE A DAY AS NEEDED FOR MUSCLE SPASMS       There is no refill protocol information for this order

## 2023-04-07 RX ORDER — CYCLOBENZAPRINE HCL 10 MG
10 TABLET ORAL AS NEEDED
Qty: 60 TABLET | Refills: 3 | Status: SHIPPED | OUTPATIENT
Start: 2023-04-07

## 2023-05-08 DIAGNOSIS — E11.3293 TYPE 2 DIABETES MELLITUS WITH BOTH EYES AFFECTED BY MILD NONPROLIFERATIVE RETINOPATHY WITHOUT MACULAR EDEMA, WITHOUT LONG-TERM CURRENT USE OF INSULIN (HCC): ICD-10-CM

## 2023-05-08 RX ORDER — SIMVASTATIN 20 MG
TABLET ORAL
Qty: 90 TABLET | Refills: 1 | Status: SHIPPED | OUTPATIENT
Start: 2023-05-08

## 2023-05-08 NOTE — TELEPHONE ENCOUNTER
Please review. Protocol failed / No protocol.     Requested Prescriptions   Pending Prescriptions Disp Refills    SIMVASTATIN 20 MG Oral Tab [Pharmacy Med Name: SIMVASTATIN 20 MG TABLET] 90 tablet 1     Sig: TAKE 1 TABLET BY MOUTH EVERY DAY AT NIGHT       Cholesterol Medication Protocol Failed - 5/8/2023 12:05 AM        Failed - ALT in past 12 months        Failed - LDL in past 12 months        Failed - Last ALT < 80     Lab Results   Component Value Date    ALT 26 03/28/2022             Failed - Last LDL < 130     Lab Results   Component Value Date    LDL 83 03/28/2022               Passed - In person appointment or virtual visit in the past 12 mos or appointment in next 3 mos     Recent Outpatient Visits              3 months ago Hypothyroidism, unspecified type    [facility], [address] [facility], Eileen Quiñonez MD    Office Visit    4 months ago Essential hypertension with goal blood pressure less than 130/85    [facility], [address] [facility], MD Marielena    Office Visit    10 months ago Essential hypertension with goal blood pressure less than 130/85    [facility], [address], MD Marielena    Office Visit    1 year ago Essential hypertension with goal blood pressure less than 130/85    [facility], [address], MD Marielena    Office Visit    1 year ago Subclinical hypothyroidism    [facility], [address] [facility], [place], Eileen Quiñonez MD    Office Visit          Future Appointments         Provider Department Appt Notes    Tomorrow Fortunato Paz MD [address], [facility] 3 month follow up  Daughter Adela Curtis requests a phone call reminder the day before for patient; he missed his last appt                     Recent Outpatient Visits              3 months ago Hypothyroidism, unspecified type    [facility], [address], [place] Matthew andres MD    Office Visit    4 months ago Essential hypertension with goal blood pressure less than 130/85    [facility], Deena Betts MD    Office Visit    10 months ago Essential hypertension with goal blood pressure less than 130/85    [facility], [address], Alvaro García MD    Office Visit    1 year ago Essential hypertension with goal blood pressure less than 130/85    [facility], [address], Alvaro García MD    Office Visit    1 year ago Subclinical hypothyroidism    [facility], [address], [facility] MD Matthew    Office Visit             Future Appointments         Provider Department Appt Notes    Tomorrow MD Anjana Keating, [address] [place] [address] 3 month follow up  Daughter April Reynolds requests a phone call reminder the day before for patient; he missed his last appt

## 2023-05-09 ENCOUNTER — OFFICE VISIT (OUTPATIENT)
Facility: CLINIC | Age: 88
End: 2023-05-09
Payer: MEDICARE

## 2023-05-09 VITALS
BODY MASS INDEX: 28.79 KG/M2 | WEIGHT: 190 LBS | DIASTOLIC BLOOD PRESSURE: 64 MMHG | OXYGEN SATURATION: 98 % | RESPIRATION RATE: 14 BRPM | HEART RATE: 68 BPM | SYSTOLIC BLOOD PRESSURE: 110 MMHG | HEIGHT: 68 IN

## 2023-05-09 DIAGNOSIS — E11.3293 TYPE 2 DIABETES MELLITUS WITH BOTH EYES AFFECTED BY MILD NONPROLIFERATIVE RETINOPATHY WITHOUT MACULAR EDEMA, WITHOUT LONG-TERM CURRENT USE OF INSULIN (HCC): ICD-10-CM

## 2023-05-09 DIAGNOSIS — E03.9 HYPOTHYROIDISM, UNSPECIFIED TYPE: ICD-10-CM

## 2023-05-09 DIAGNOSIS — I10 ESSENTIAL HYPERTENSION WITH GOAL BLOOD PRESSURE LESS THAN 130/85: Primary | ICD-10-CM

## 2023-05-09 DIAGNOSIS — E78.2 MIXED HYPERLIPIDEMIA: ICD-10-CM

## 2023-05-09 PROCEDURE — 99214 OFFICE O/P EST MOD 30 MIN: CPT | Performed by: INTERNAL MEDICINE

## 2023-05-09 PROCEDURE — 1126F AMNT PAIN NOTED NONE PRSNT: CPT | Performed by: INTERNAL MEDICINE

## 2023-05-09 RX ORDER — CYCLOBENZAPRINE HCL 10 MG
10 TABLET ORAL EVERY 12 HOURS PRN
Qty: 180 TABLET | Refills: 1 | Status: SHIPPED | OUTPATIENT
Start: 2023-05-09 | End: 2023-08-07

## 2023-05-16 ENCOUNTER — TELEPHONE (OUTPATIENT)
Facility: CLINIC | Age: 88
End: 2023-05-16

## 2023-05-16 ENCOUNTER — LAB ENCOUNTER (OUTPATIENT)
Dept: LAB | Age: 88
End: 2023-05-16
Attending: INTERNAL MEDICINE
Payer: MEDICARE

## 2023-05-16 DIAGNOSIS — E11.3293 TYPE 2 DIABETES MELLITUS WITH BOTH EYES AFFECTED BY MILD NONPROLIFERATIVE RETINOPATHY WITHOUT MACULAR EDEMA, WITHOUT LONG-TERM CURRENT USE OF INSULIN (HCC): ICD-10-CM

## 2023-05-16 DIAGNOSIS — I10 ESSENTIAL HYPERTENSION WITH GOAL BLOOD PRESSURE LESS THAN 130/85: ICD-10-CM

## 2023-05-16 LAB
ALBUMIN SERPL-MCNC: 3.7 G/DL (ref 3.4–5)
ALBUMIN/GLOB SERPL: 1.1 {RATIO} (ref 1–2)
ALP LIVER SERPL-CCNC: 88 U/L
ALT SERPL-CCNC: 22 U/L
ANION GAP SERPL CALC-SCNC: 3 MMOL/L (ref 0–18)
AST SERPL-CCNC: 12 U/L (ref 15–37)
BILIRUB SERPL-MCNC: 0.4 MG/DL (ref 0.1–2)
BUN BLD-MCNC: 22 MG/DL (ref 7–18)
BUN/CREAT SERPL: 17.7 (ref 10–20)
CALCIUM BLD-MCNC: 9.6 MG/DL (ref 8.5–10.1)
CHLORIDE SERPL-SCNC: 110 MMOL/L (ref 98–112)
CHOLEST SERPL-MCNC: 128 MG/DL (ref ?–200)
CO2 SERPL-SCNC: 23 MMOL/L (ref 21–32)
CREAT BLD-MCNC: 1.24 MG/DL
CREAT UR-SCNC: 137 MG/DL
DEPRECATED RDW RBC AUTO: 41.4 FL (ref 35.1–46.3)
ERYTHROCYTE [DISTWIDTH] IN BLOOD BY AUTOMATED COUNT: 13.4 % (ref 11–15)
EST. AVERAGE GLUCOSE BLD GHB EST-MCNC: 189 MG/DL (ref 68–126)
FASTING PATIENT LIPID ANSWER: YES
FASTING STATUS PATIENT QL REPORTED: YES
GFR SERPLBLD BASED ON 1.73 SQ M-ARVRAT: 57 ML/MIN/1.73M2 (ref 60–?)
GLOBULIN PLAS-MCNC: 3.3 G/DL (ref 2.8–4.4)
GLUCOSE BLD-MCNC: 159 MG/DL (ref 70–99)
HBA1C MFR BLD: 8.2 % (ref ?–5.7)
HCT VFR BLD AUTO: 37.3 %
HDLC SERPL-MCNC: 36 MG/DL (ref 40–59)
HGB BLD-MCNC: 11.8 G/DL
LDLC SERPL CALC-MCNC: 59 MG/DL (ref ?–100)
MCH RBC QN AUTO: 26.6 PG (ref 26–34)
MCHC RBC AUTO-ENTMCNC: 31.6 G/DL (ref 31–37)
MCV RBC AUTO: 84 FL
MICROALBUMIN UR-MCNC: 3.55 MG/DL
MICROALBUMIN/CREAT 24H UR-RTO: 25.9 UG/MG (ref ?–30)
NONHDLC SERPL-MCNC: 92 MG/DL (ref ?–130)
OSMOLALITY SERPL CALC.SUM OF ELEC: 289 MOSM/KG (ref 275–295)
PLATELET # BLD AUTO: 213 10(3)UL (ref 150–450)
POTASSIUM SERPL-SCNC: 5.2 MMOL/L (ref 3.5–5.1)
PROT SERPL-MCNC: 7 G/DL (ref 6.4–8.2)
RBC # BLD AUTO: 4.44 X10(6)UL
SODIUM SERPL-SCNC: 136 MMOL/L (ref 136–145)
T4 FREE SERPL-MCNC: 0.9 NG/DL (ref 0.8–1.7)
TRIGL SERPL-MCNC: 200 MG/DL (ref 30–149)
TSI SER-ACNC: 5.18 MIU/ML (ref 0.36–3.74)
VLDLC SERPL CALC-MCNC: 29 MG/DL (ref 0–30)
WBC # BLD AUTO: 6.9 X10(3) UL (ref 4–11)

## 2023-05-16 PROCEDURE — 80061 LIPID PANEL: CPT

## 2023-05-16 PROCEDURE — 84439 ASSAY OF FREE THYROXINE: CPT

## 2023-05-16 PROCEDURE — 82570 ASSAY OF URINE CREATININE: CPT

## 2023-05-16 PROCEDURE — 83036 HEMOGLOBIN GLYCOSYLATED A1C: CPT

## 2023-05-16 PROCEDURE — 36415 COLL VENOUS BLD VENIPUNCTURE: CPT

## 2023-05-16 PROCEDURE — 84443 ASSAY THYROID STIM HORMONE: CPT

## 2023-05-16 PROCEDURE — 82043 UR ALBUMIN QUANTITATIVE: CPT

## 2023-05-16 PROCEDURE — 85027 COMPLETE CBC AUTOMATED: CPT

## 2023-05-16 PROCEDURE — 80053 COMPREHEN METABOLIC PANEL: CPT

## 2023-05-17 NOTE — TELEPHONE ENCOUNTER
Lucas Hardin,  Hope you are well. Our  mutual patient with history of diabetes and hypothyroidism has completed blood testing. Thyroid is getting better. Diabetes got worse. He does have an appointment to see you on July 18. I have instructed him to contact you so that we can adjust medications earlier.   Thank you  Christina Choi

## 2023-05-17 NOTE — TELEPHONE ENCOUNTER
Hi!  Can we try to fit patient in sooner? I would like to see him in the next few weeks if possible. Thank you!

## 2023-05-19 ENCOUNTER — TELEPHONE (OUTPATIENT)
Dept: ENDOCRINOLOGY CLINIC | Facility: CLINIC | Age: 88
End: 2023-05-19

## 2023-05-19 NOTE — TELEPHONE ENCOUNTER
Daughter states pt A1C is higher and is also having a sore throat and thinks it is due to the A1C please follow up

## 2023-05-19 NOTE — TELEPHONE ENCOUNTER
Spoke to daughter, scheduled 5/23 at 1130am for DM adjustments.  Daughter requesting call and update after OV

## 2023-05-23 ENCOUNTER — OFFICE VISIT (OUTPATIENT)
Dept: ENDOCRINOLOGY CLINIC | Facility: CLINIC | Age: 88
End: 2023-05-23

## 2023-05-23 VITALS
SYSTOLIC BLOOD PRESSURE: 105 MMHG | HEART RATE: 82 BPM | HEIGHT: 68 IN | DIASTOLIC BLOOD PRESSURE: 50 MMHG | WEIGHT: 200.5 LBS | BODY MASS INDEX: 30.39 KG/M2

## 2023-05-23 DIAGNOSIS — E78.5 DYSLIPIDEMIA: ICD-10-CM

## 2023-05-23 DIAGNOSIS — E03.9 HYPOTHYROIDISM, UNSPECIFIED TYPE: ICD-10-CM

## 2023-05-23 DIAGNOSIS — E11.3293 TYPE 2 DIABETES MELLITUS WITH BOTH EYES AFFECTED BY MILD NONPROLIFERATIVE RETINOPATHY WITHOUT MACULAR EDEMA, WITHOUT LONG-TERM CURRENT USE OF INSULIN (HCC): Primary | ICD-10-CM

## 2023-05-23 LAB
GLUCOSE BLOOD: 199
TEST STRIP LOT #: NORMAL NUMERIC

## 2023-05-23 PROCEDURE — 99214 OFFICE O/P EST MOD 30 MIN: CPT | Performed by: INTERNAL MEDICINE

## 2023-05-23 PROCEDURE — 82947 ASSAY GLUCOSE BLOOD QUANT: CPT | Performed by: INTERNAL MEDICINE

## 2023-05-23 RX ORDER — GLIPIZIDE 5 MG/1
5 TABLET ORAL
Qty: 180 TABLET | Refills: 0 | Status: SHIPPED | OUTPATIENT
Start: 2023-05-23 | End: 2023-08-21

## 2023-06-23 NOTE — TELEPHONE ENCOUNTER
Pts daughter wants the results over the phone and wants a sooner appt, very frustrated and upset that she has yet to hear from our office, angelita soto pneumothorax, mass or effusion on CXR. EKG is nonischemic with negative troponin, doubt ACS. Starting hemoglobin is 6.1 with evidence of occult GI bleeding. Blood ordered for resuscitation. Will need GI evaluation and monitoring for response to treatment. Hospitalist was consulted for admission and will see the patient in the emergency department. Problems Addressed:  Symptomatic anemia: acute illness or injury    Amount and/or Complexity of Data Reviewed  Labs: ordered. Decision-making details documented in ED Course. Radiology: ordered and independent interpretation performed. Decision-making details documented in ED Course. ECG/medicine tests: ordered and independent interpretation performed. Decision-making details documented in ED Course. Risk  Prescription drug management. Decision regarding hospitalization. REASSESSMENT     ED Course as of 06/22/23 2349   Thu Jun 22, 2023   2222 EKG 2216: Rate 84, Normal sinus rhythm, No ST segment or T wave abnormalities. Prolonged QTc. Otherwise normal EKG. [DK]      ED Course User Index  [DK] Valerie Castaneda MD           CONSULTS:  None    PROCEDURES:  Unless otherwise noted below, none     Procedures    Critical Care Time: 35 minutes  I personally performed critical care time separate of billable procedures which may include ordering and interpretation of testing, ordering of medications, direct patient assessment and reassessment, discussion with consultants or family, and documentation. FINAL IMPRESSION      1. Symptomatic anemia          DISPOSITION/PLAN   DISPOSITION Decision To Admit 06/22/2023 11:47:39 PM    Perfect Serve Consult for Admission  11:49 PM    ED Room Number: ER04/04  Patient Name and age:  Al Miller 82 y.o.  female  Working Diagnosis:   1.  Symptomatic anemia        COVID-19 Suspicion: No  Sepsis present:  No  Reassessment needed: No  Code Status:  Full Code  Readmission: No  Isolation Requirements:

## 2023-06-27 DIAGNOSIS — E03.9 HYPOTHYROIDISM, UNSPECIFIED TYPE: ICD-10-CM

## 2023-06-29 RX ORDER — LEVOTHYROXINE SODIUM 0.05 MG/1
TABLET ORAL
Qty: 90 TABLET | Refills: 0 | Status: SHIPPED | OUTPATIENT
Start: 2023-06-29

## 2023-08-02 ENCOUNTER — LAB ENCOUNTER (OUTPATIENT)
Dept: LAB | Age: 88
End: 2023-08-02
Attending: INTERNAL MEDICINE
Payer: MEDICARE

## 2023-08-02 DIAGNOSIS — E78.5 DYSLIPIDEMIA: ICD-10-CM

## 2023-08-02 DIAGNOSIS — I10 ESSENTIAL HYPERTENSION WITH GOAL BLOOD PRESSURE LESS THAN 130/85: ICD-10-CM

## 2023-08-02 DIAGNOSIS — E11.3293 TYPE 2 DIABETES MELLITUS WITH BOTH EYES AFFECTED BY MILD NONPROLIFERATIVE RETINOPATHY WITHOUT MACULAR EDEMA, WITHOUT LONG-TERM CURRENT USE OF INSULIN (HCC): ICD-10-CM

## 2023-08-02 DIAGNOSIS — E03.9 HYPOTHYROIDISM, UNSPECIFIED TYPE: ICD-10-CM

## 2023-08-02 LAB
ALBUMIN SERPL-MCNC: 3.6 G/DL (ref 3.4–5)
ALBUMIN/GLOB SERPL: 1.1 {RATIO} (ref 1–2)
ALP LIVER SERPL-CCNC: 84 U/L
ALT SERPL-CCNC: 20 U/L
ANION GAP SERPL CALC-SCNC: 5 MMOL/L (ref 0–18)
AST SERPL-CCNC: 10 U/L (ref 15–37)
BILIRUB SERPL-MCNC: 0.4 MG/DL (ref 0.1–2)
BUN BLD-MCNC: 28 MG/DL (ref 7–18)
CALCIUM BLD-MCNC: 9.2 MG/DL (ref 8.5–10.1)
CHLORIDE SERPL-SCNC: 108 MMOL/L (ref 98–112)
CHOLEST SERPL-MCNC: 126 MG/DL (ref ?–200)
CO2 SERPL-SCNC: 25 MMOL/L (ref 21–32)
CREAT BLD-MCNC: 1.58 MG/DL
CREAT UR-SCNC: 120 MG/DL
EGFRCR SERPLBLD CKD-EPI 2021: 42 ML/MIN/1.73M2 (ref 60–?)
ERYTHROCYTE [DISTWIDTH] IN BLOOD BY AUTOMATED COUNT: 14.1 %
EST. AVERAGE GLUCOSE BLD GHB EST-MCNC: 174 MG/DL (ref 68–126)
FASTING PATIENT LIPID ANSWER: YES
FASTING STATUS PATIENT QL REPORTED: YES
GLOBULIN PLAS-MCNC: 3.3 G/DL (ref 2.8–4.4)
GLUCOSE BLD-MCNC: 124 MG/DL (ref 70–99)
HBA1C MFR BLD: 7.7 % (ref ?–5.7)
HCT VFR BLD AUTO: 36.3 %
HDLC SERPL-MCNC: 36 MG/DL (ref 40–59)
HGB BLD-MCNC: 11.1 G/DL
LDLC SERPL CALC-MCNC: 63 MG/DL (ref ?–100)
MCH RBC QN AUTO: 27.2 PG (ref 26–34)
MCHC RBC AUTO-ENTMCNC: 30.6 G/DL (ref 31–37)
MCV RBC AUTO: 89 FL
MICROALBUMIN UR-MCNC: 1.55 MG/DL
MICROALBUMIN/CREAT 24H UR-RTO: 12.9 UG/MG (ref ?–30)
NONHDLC SERPL-MCNC: 90 MG/DL (ref ?–130)
OSMOLALITY SERPL CALC.SUM OF ELEC: 293 MOSM/KG (ref 275–295)
PLATELET # BLD AUTO: 190 10(3)UL (ref 150–450)
POTASSIUM SERPL-SCNC: 4.7 MMOL/L (ref 3.5–5.1)
PROT SERPL-MCNC: 6.9 G/DL (ref 6.4–8.2)
RBC # BLD AUTO: 4.08 X10(6)UL
SODIUM SERPL-SCNC: 138 MMOL/L (ref 136–145)
T4 FREE SERPL-MCNC: 1 NG/DL (ref 0.8–1.7)
TRIGL SERPL-MCNC: 155 MG/DL (ref 30–149)
TSI SER-ACNC: 1.91 MIU/ML (ref 0.36–3.74)
VLDLC SERPL CALC-MCNC: 23 MG/DL (ref 0–30)
WBC # BLD AUTO: 6.4 X10(3) UL (ref 4–11)

## 2023-08-02 PROCEDURE — 80053 COMPREHEN METABOLIC PANEL: CPT

## 2023-08-02 PROCEDURE — 84443 ASSAY THYROID STIM HORMONE: CPT

## 2023-08-02 PROCEDURE — 82043 UR ALBUMIN QUANTITATIVE: CPT

## 2023-08-02 PROCEDURE — 82570 ASSAY OF URINE CREATININE: CPT

## 2023-08-02 PROCEDURE — 83036 HEMOGLOBIN GLYCOSYLATED A1C: CPT

## 2023-08-02 PROCEDURE — 85027 COMPLETE CBC AUTOMATED: CPT

## 2023-08-02 PROCEDURE — 36415 COLL VENOUS BLD VENIPUNCTURE: CPT

## 2023-08-02 PROCEDURE — 84439 ASSAY OF FREE THYROXINE: CPT

## 2023-08-02 PROCEDURE — 80061 LIPID PANEL: CPT

## 2023-08-10 ENCOUNTER — OFFICE VISIT (OUTPATIENT)
Facility: CLINIC | Age: 88
End: 2023-08-10

## 2023-08-10 VITALS
BODY MASS INDEX: 28.79 KG/M2 | SYSTOLIC BLOOD PRESSURE: 102 MMHG | HEART RATE: 82 BPM | OXYGEN SATURATION: 97 % | WEIGHT: 190 LBS | RESPIRATION RATE: 14 BRPM | HEIGHT: 68 IN | DIASTOLIC BLOOD PRESSURE: 60 MMHG

## 2023-08-10 DIAGNOSIS — E03.9 HYPOTHYROIDISM, UNSPECIFIED TYPE: ICD-10-CM

## 2023-08-10 DIAGNOSIS — N17.9 ACUTE RENAL FAILURE, UNSPECIFIED ACUTE RENAL FAILURE TYPE (HCC): ICD-10-CM

## 2023-08-10 DIAGNOSIS — E11.3293 TYPE 2 DIABETES MELLITUS WITH BOTH EYES AFFECTED BY MILD NONPROLIFERATIVE RETINOPATHY WITHOUT MACULAR EDEMA, WITHOUT LONG-TERM CURRENT USE OF INSULIN (HCC): ICD-10-CM

## 2023-08-10 DIAGNOSIS — E78.5 DYSLIPIDEMIA: ICD-10-CM

## 2023-08-10 DIAGNOSIS — I10 ESSENTIAL HYPERTENSION WITH GOAL BLOOD PRESSURE LESS THAN 130/85: Primary | ICD-10-CM

## 2023-08-10 PROCEDURE — 1126F AMNT PAIN NOTED NONE PRSNT: CPT | Performed by: INTERNAL MEDICINE

## 2023-08-10 PROCEDURE — 99214 OFFICE O/P EST MOD 30 MIN: CPT | Performed by: INTERNAL MEDICINE

## 2023-08-21 ENCOUNTER — LAB ENCOUNTER (OUTPATIENT)
Dept: LAB | Age: 88
End: 2023-08-21
Attending: INTERNAL MEDICINE
Payer: MEDICARE

## 2023-08-21 DIAGNOSIS — N17.9 ACUTE RENAL FAILURE, UNSPECIFIED ACUTE RENAL FAILURE TYPE (HCC): ICD-10-CM

## 2023-08-21 LAB
ANION GAP SERPL CALC-SCNC: 6 MMOL/L (ref 0–18)
BUN BLD-MCNC: 34 MG/DL (ref 7–18)
CALCIUM BLD-MCNC: 9.5 MG/DL (ref 8.5–10.1)
CHLORIDE SERPL-SCNC: 106 MMOL/L (ref 98–112)
CO2 SERPL-SCNC: 25 MMOL/L (ref 21–32)
CREAT BLD-MCNC: 1.6 MG/DL
EGFRCR SERPLBLD CKD-EPI 2021: 42 ML/MIN/1.73M2 (ref 60–?)
FASTING STATUS PATIENT QL REPORTED: YES
GLUCOSE BLD-MCNC: 96 MG/DL (ref 70–99)
OSMOLALITY SERPL CALC.SUM OF ELEC: 291 MOSM/KG (ref 275–295)
POTASSIUM SERPL-SCNC: 4.7 MMOL/L (ref 3.5–5.1)
SODIUM SERPL-SCNC: 137 MMOL/L (ref 136–145)

## 2023-08-21 PROCEDURE — 80048 BASIC METABOLIC PNL TOTAL CA: CPT

## 2023-08-21 PROCEDURE — 36415 COLL VENOUS BLD VENIPUNCTURE: CPT

## 2023-08-29 ENCOUNTER — OFFICE VISIT (OUTPATIENT)
Dept: ENDOCRINOLOGY CLINIC | Facility: CLINIC | Age: 88
End: 2023-08-29

## 2023-08-29 VITALS
HEART RATE: 72 BPM | SYSTOLIC BLOOD PRESSURE: 128 MMHG | HEIGHT: 67 IN | BODY MASS INDEX: 29.82 KG/M2 | WEIGHT: 190 LBS | DIASTOLIC BLOOD PRESSURE: 66 MMHG

## 2023-08-29 DIAGNOSIS — E11.3293 TYPE 2 DIABETES MELLITUS WITH BOTH EYES AFFECTED BY MILD NONPROLIFERATIVE RETINOPATHY WITHOUT MACULAR EDEMA, WITHOUT LONG-TERM CURRENT USE OF INSULIN (HCC): Primary | ICD-10-CM

## 2023-08-29 DIAGNOSIS — I10 ESSENTIAL HYPERTENSION WITH GOAL BLOOD PRESSURE LESS THAN 130/85: ICD-10-CM

## 2023-08-29 DIAGNOSIS — E03.9 HYPOTHYROIDISM, UNSPECIFIED TYPE: ICD-10-CM

## 2023-08-29 DIAGNOSIS — E55.9 VITAMIN D DEFICIENCY: ICD-10-CM

## 2023-08-29 DIAGNOSIS — E78.5 DYSLIPIDEMIA: ICD-10-CM

## 2023-08-29 DIAGNOSIS — R20.0 NUMBNESS: ICD-10-CM

## 2023-08-29 LAB
CARTRIDGE LOT#: ABNORMAL NUMERIC
GLUCOSE BLOOD: 270
HEMOGLOBIN A1C: 6.5 % (ref 4.3–5.6)
TEST STRIP LOT #: NORMAL NUMERIC

## 2023-08-29 PROCEDURE — 83036 HEMOGLOBIN GLYCOSYLATED A1C: CPT | Performed by: INTERNAL MEDICINE

## 2023-08-29 PROCEDURE — 99214 OFFICE O/P EST MOD 30 MIN: CPT | Performed by: INTERNAL MEDICINE

## 2023-08-29 PROCEDURE — 82947 ASSAY GLUCOSE BLOOD QUANT: CPT | Performed by: INTERNAL MEDICINE

## 2023-08-29 RX ORDER — LEVOTHYROXINE SODIUM 0.05 MG/1
50 TABLET ORAL
Qty: 90 TABLET | Refills: 3 | Status: SHIPPED | OUTPATIENT
Start: 2023-08-29

## 2023-09-07 ENCOUNTER — TELEPHONE (OUTPATIENT)
Dept: ENDOCRINOLOGY CLINIC | Facility: CLINIC | Age: 88
End: 2023-09-07

## 2023-09-07 NOTE — TELEPHONE ENCOUNTER
Per LOV note with Dr. Cassandra Kumar 8/29/23:  Patient concerned fasting BG are elevated since stopping metformin at [facility]. Instructions    Please stop metformin  Only take glipizide 5mg twice daily  Continue levothyroxine 50mg every day  Come back to clinic in 3 months with fasting blood work           I called and spoke with the patient's daughter. She states his sugars have been in the 190's since stopping metformin. She is unsure if he is checking fasting or post prandial. I called the patient with an [facility]  per daughter request. ID# [id number]. Hyperglycemia     Onset of hyperglycemia: Since stopping metformin he feels his sugars are higher    BG levels (please print out CGM and/or pump report if patient is wearing one): Not recording sugars. Only checking fasting sugars. States fasting sugars are 150-190. Symptoms (RN only: N/V, abdominal pain and/or radiating to the back, blurry vision, increased urinary frequency, blurred vision, CP, SOB): Asymptomatic    Pattern of hyperglycemia: Fasting sugars high. Steroid therapy: none    Acute Illness: none    Change in Diet: No change    List DM Medications/Compliance:   Glipizide 5 mg BID - Taking  Metformin - stopped after last visit    Confirmed he has a scheduled follow up in November. Patient's daughter April Reynolds is requesting calling back with instructions. Patient stated to call with April Reynolds with all instructions.

## 2023-09-07 NOTE — TELEPHONE ENCOUNTER
Patient's daughter is calling states that the patient misunderstood which medication to discontinue.  States that his blood sugars have been 190

## 2023-09-08 NOTE — TELEPHONE ENCOUNTER
Hi!    Please ask the daughter to clarify which medication he had stopped 2 months ago. In May, I had asked him to take both metformin and glipizide. During the visit in August, he told me that he had stopped the metformin, so I just asked him to continue off of it, since his HbA1c reflected good blood sugars. If this is not the case, then he should restart the metformin ER 1000mg PO bid along with the glipizide 5mg PO bid and send me the blood sugars, fasting and two hours post-prandial after 1 week. Thank you!

## 2023-09-19 ENCOUNTER — TELEPHONE (OUTPATIENT)
Dept: ENDOCRINOLOGY CLINIC | Facility: CLINIC | Age: 88
End: 2023-09-19

## 2023-09-19 DIAGNOSIS — E11.3293 TYPE 2 DIABETES MELLITUS WITH BOTH EYES AFFECTED BY MILD NONPROLIFERATIVE RETINOPATHY WITHOUT MACULAR EDEMA, WITHOUT LONG-TERM CURRENT USE OF INSULIN (HCC): Primary | ICD-10-CM

## 2023-09-19 DIAGNOSIS — E11.65 TYPE 2 DIABETES MELLITUS WITH HYPERGLYCEMIA, WITHOUT LONG-TERM CURRENT USE OF INSULIN (HCC): ICD-10-CM

## 2023-09-19 NOTE — TELEPHONE ENCOUNTER
[facility] : Mariam Yung [id number]    RN tried calling patient using [facility]  and did not answer.  left a message for patient to call back.

## 2023-09-19 NOTE — TELEPHONE ENCOUNTER
Hi!  Please ask patient to increase glipizide dose to 10mg in the morning and continue with the 5mg in the evening and check regularly the blood sugars (fasting and two hours after biggest meal and send them to us in 3-4 days. Thank you!

## 2023-09-19 NOTE — TELEPHONE ENCOUNTER
Dr. Sidney Luque to patient via  Chirag Eubanks #[id number]  Hyperglycemia     Onset of hyperglycemia: ongoing    BG levels (please print out CGM and/or pump report if patient is wearing one):  patient checks fasting and sometimes at night - could only provide fasting today: 220 and last nigh was 320 - patient states \"they have never been this high\"    Symptoms (RN only: N/V, abdominal pain and/or radiating to the back, blurry vision, increased urinary frequency, blurred vision, CP, SOB): only symptom is patient having hard time with bowel movements    Pattern of hyperglycemia: ongoing    Steroid therapy: none    Acute Illness: none    Change in Diet: patient states he is not eating pasta - eating move vegetables    List DM Medications/Compliance: confirms taking:    MTF ER 1000mg BID   Glipizide 5mg BID      Please advise - thanks

## 2023-09-19 NOTE — TELEPHONE ENCOUNTER
Elmira Mcginnis states patient is experiencing elevated blood sugars in the 300s. Also states patient is also having a hard time having a bowel movement. Please call patient at [phone] - speaks [facility]. For additional questions please call. Thank you.

## 2023-09-21 NOTE — TELEPHONE ENCOUNTER
Dr. Alvarez Listen,     Please advise     Spoke to patient's daughter, requesting patient to be back on Metformin    Since on Glipizide- weight gain and bowel movement issues. Okay to restart Metformin ER 1000mg PO BID and dc Glipizide?

## 2023-09-22 NOTE — TELEPHONE ENCOUNTER
Hi!  I thought that patient was already taking metformin? Please ask the daughter to make sure of what the patient is taking. Glipizide usually does not cause diarrhea. It can lead to weight gain, but only in special circumstances. Thank you!

## 2023-09-25 RX ORDER — METFORMIN HYDROCHLORIDE 500 MG/1
500 TABLET, EXTENDED RELEASE ORAL 2 TIMES DAILY WITH MEALS
Qty: 180 TABLET | Refills: 0 | Status: SHIPPED | OUTPATIENT
Start: 2023-09-25 | End: 2023-12-24

## 2023-09-25 NOTE — TELEPHONE ENCOUNTER
Hi!    Please ask patient to stop the glipizide and start metformin ER 500mg PO twice daily. Please ask him to check blood sugars fasting and two hours after biggest meal and follow up in 1 week. Thank you!

## 2023-09-25 NOTE — TELEPHONE ENCOUNTER
Dr. Luis Anne    I spoke to patient's daughter Last Bright - patient has been taking Glipizide 10 mg and 5 mg daily. Has not been taking Metformin. Patient's daughter stated that blood sugars have been high and patient has been constipated- I did tell her that this may not be related to diabetic medications - patient's daughter is convinced symptoms are from Glipizide. Patient has NOT been writing down blood sugar levels. I did tell patient's jhonny hater to have her dad start writing these down and keep a log. Patient's daughter stated that there is a language barrier and patient does not understand. I told her that someone should be accompanying him to appointments in that [place] - patients daughter stated that this is not possible. I suggested that they should then call ahead and request an  to be available during visit. I added this note to his chart in addition. Patient's daughter requesting to know about medication changes if possible - please advise.

## 2023-09-26 NOTE — TELEPHONE ENCOUNTER
Spoke to patient's daughter to confirm dose of MTF ER: 500mg with breakfast and 500mg with dinner - daughter stated understanding that MTF ER has to be taken with food to avoid GI SE  Daughter stated understanding and denied further questions at this time

## 2023-09-26 NOTE — TELEPHONE ENCOUNTER
Spoke to patient's daughter to relay message below - daughter stated understanding for patient to stop glipizide and start MTF ER 500mg BID   Patient will try to check BG fasting and 2 hours after biggest meal and give update in one week  RN reviewed that Dr. Chirag Perla placed referral for nephrology - # given to patient's daughter

## 2023-10-08 RX ORDER — BLOOD-GLUCOSE METER
1 KIT MISCELLANEOUS DAILY
Qty: 100 STRIP | Refills: 3 | Status: SHIPPED | OUTPATIENT
Start: 2023-10-08 | End: 2023-10-11

## 2023-10-08 NOTE — TELEPHONE ENCOUNTER
Refill passed per [facility] protocol.     Requested Prescriptions   Pending Prescriptions Disp Refills    FREESTYLE LITE TEST In Vitro Strip [Pharmacy Med Name: FREESTYLE LITE TEST STRIP] 100 strip 3     Sig: USE 1 STRIP EVERY DAY       Diabetic Supplies Protocol Passed - 10/6/2023 10:44 AM        Passed - In person appointment or virtual visit in the past 12 mos or appointment in next 3 mos     Recent Outpatient Visits              1 month ago Type 2 diabetes mellitus with both eyes affected by mild nonproliferative retinopathy without macular edema, without long-term current use of insulin ([facility]    Office Visit    1 month ago Essential hypertension with goal blood pressure less than 130/85    [facility]    Office Visit    4 months ago Type 2 diabetes mellitus with both eyes affected by mild nonproliferative retinopathy without macular edema, without long-term current use of insulin ([facility]    Office Visit    5 months ago Essential hypertension with goal blood pressure less than 130/85    [facility]    Office Visit    8 months ago Hypothyroidism, unspecified type    [facility] Lashawn Mazariegos MD    Office Visit          Future Appointments         Provider Department Appt Notes    In 1 month [facility]    In 2 months Mirza Gates MD [facility] Return in about 4 months (around 12/10/2023).

## 2023-10-09 ENCOUNTER — TELEPHONE (OUTPATIENT)
Dept: INTERNAL MEDICINE CLINIC | Facility: CLINIC | Age: 88
End: 2023-10-09

## 2023-10-09 NOTE — TELEPHONE ENCOUNTER
Current Outpatient Medications:     Glucose Blood (FREESTYLE LITE TEST) In Vitro Strip, 1 strip by In Vitro route daily. , Disp: 100 strip, Rfl: 3

## 2023-10-11 RX ORDER — BLOOD-GLUCOSE METER
EACH MISCELLANEOUS
Qty: 1 KIT | Refills: 0 | Status: SHIPPED | OUTPATIENT
Start: 2023-10-11

## 2023-10-11 RX ORDER — BLOOD-GLUCOSE METER
KIT MISCELLANEOUS
Qty: 300 STRIP | Refills: 0 | Status: SHIPPED | OUTPATIENT
Start: 2023-10-11

## 2023-10-11 RX ORDER — LANCETS
EACH MISCELLANEOUS
Qty: 300 EACH | Refills: 0 | Status: SHIPPED | OUTPATIENT
Start: 2023-10-11

## 2023-10-11 RX ORDER — BLOOD SUGAR DIAGNOSTIC
STRIP MISCELLANEOUS
Qty: 300 STRIP | Refills: 0 | Status: SHIPPED | OUTPATIENT
Start: 2023-10-11

## 2023-10-11 NOTE — TELEPHONE ENCOUNTER
Patient daughter called in- regards to the pharmacy stating that they have not received the medication below although it stated that it was sent 10/08. Patient is out of the strips since [facility] has the patient taking his blood pressure twice or three times a day depends    Current Outpatient Medications:     Glucose Blood (FREESTYLE LITE TEST) In Vitro Strip, 1 strip by In Vitro route daily. , Disp: 100 strip, Rfl: 3

## 2023-10-11 NOTE — TELEPHONE ENCOUNTER
Sent Rx for Freestyle lite test strips, called pharmacy and preferred glucometer are Accu-check or one touch verio     Insurance will not cover freestyle lite     Called daughter, no answer, left detailed vm

## 2023-10-18 RX ORDER — BLOOD SUGAR DIAGNOSTIC
STRIP MISCELLANEOUS
Qty: 300 STRIP | Refills: 0 | Status: SHIPPED | OUTPATIENT
Start: 2023-10-18

## 2023-10-23 ENCOUNTER — TELEPHONE (OUTPATIENT)
Dept: ENDOCRINOLOGY CLINIC | Facility: CLINIC | Age: 88
End: 2023-10-23

## 2023-10-23 NOTE — TELEPHONE ENCOUNTER
[employer] will not pay for lancets if patient is testing multiple times a day. Insurance will not pay for lancets because patient is not on insulin and should only test once per day. Please call. Thank you.

## 2023-10-23 NOTE — TELEPHONE ENCOUNTER
Pt daughter wanted to make sure MD knows that the pt is not taking medication as instructed. Pt has stopped the Metformin and he is taking Glipizide 1 qam and 1 qpm.  She is requesting to speak with an RN.   Please call

## 2023-10-26 NOTE — TELEPHONE ENCOUNTER
Brittnee Bang, pt daughter called stating pt never restarted metformin, only taking glipizide in the am and pm. Pt is also not checking BG at different times of the day as recommended. Per daughter, pt lives alone and will be coming for follow up in November. Pt daughter states either herself or daughter will try to come  but if not pt needs Moses . Pt chart updated.

## 2023-10-26 NOTE — TELEPHONE ENCOUNTER
Hi!  This has been noted. Please contact daughter and ask her if she has any ideas as to how we can help him more and if she feels that a patient advocate can be helpful? Thank you!

## 2023-11-04 DIAGNOSIS — E11.3293 TYPE 2 DIABETES MELLITUS WITH BOTH EYES AFFECTED BY MILD NONPROLIFERATIVE RETINOPATHY WITHOUT MACULAR EDEMA, WITHOUT LONG-TERM CURRENT USE OF INSULIN (HCC): ICD-10-CM

## 2023-11-05 RX ORDER — SIMVASTATIN 20 MG
20 TABLET ORAL NIGHTLY
Qty: 90 TABLET | Refills: 3 | Status: SHIPPED | OUTPATIENT
Start: 2023-11-05

## 2023-11-05 NOTE — TELEPHONE ENCOUNTER
Refill passed per [facility] protocol.    Requested Prescriptions   Pending Prescriptions Disp Refills    SIMVASTATIN 20 MG Oral Tab [Pharmacy Med Name: SIMVASTATIN 20 MG TABLET] 90 tablet 1     Sig: TAKE 1 TABLET BY MOUTH EVERY DAY AT NIGHT       Cholesterol Medication Protocol Passed - 11/4/2023  8:06 AM        Passed - ALT in past 12 months        Passed - LDL in past 12 months        Passed - Last ALT < 80     Lab Results   Component Value Date    ALT 20 08/02/2023             Passed - Last LDL < 130     Lab Results   Component Value Date    LDL 63 08/02/2023             Passed - In person appointment or virtual visit in the past 12 mos or appointment in next 3 mos     Recent Outpatient Visits              2 months ago Type 2 diabetes mellitus with both eyes affected by mild nonproliferative retinopathy without macular edema, without long-term current use of insulin ([facility])    [facility]    Office Visit    2 months ago Essential hypertension with goal blood pressure less than 130/85    [facility]    Office Visit    5 months ago Type 2 diabetes mellitus with both eyes affected by mild nonproliferative retinopathy without macular edema, without long-term current use of insulin ([facility])    [facility]    Office Visit    6 months ago Essential hypertension with goal blood pressure less than 130/85    [facility]    Office Visit    9 months ago Hypothyroidism, unspecified type    [facility]    Office Visit          Future Appointments         Provider Department Appt Notes    In 3 weeks Lashawn Mazariegos MD [facility] 3 mos,per pt  daugther please use [place]     In 1 month Mirza Gates MD [facility] Return in about 4 months (around 12/10/2023).                           Future Appointments         Provider Department Appt Notes    In 3 weeks [facility],per pt daugther please use [place]     In 1 month Mirza Gates MD [facility] Return in about 4 months (around 12/10/2023).            Recent Outpatient Visits              2 months ago Type 2 diabetes mellitus with both eyes affected by mild nonproliferative retinopathy without macular edema, without long-term current use of insulin ([facility])    [facility]    Office Visit    2 months ago Essential hypertension with goal blood pressure less than 130/85    [facility]    Office Visit    5 months ago Type 2 diabetes mellitus with both eyes affected by mild nonproliferative retinopathy without macular edema, without long-term current use of insulin ([facility])    [facility]    Office Visit    6 months ago Essential hypertension with goal blood pressure less than 130/85    [facility]    Office Visit    9 months ago Hypothyroidism, unspecified type    [facility] Lashawn Mazariegos MD    Office Visit

## 2023-11-22 DIAGNOSIS — E11.3293 TYPE 2 DIABETES MELLITUS WITH BOTH EYES AFFECTED BY MILD NONPROLIFERATIVE RETINOPATHY WITHOUT MACULAR EDEMA, WITHOUT LONG-TERM CURRENT USE OF INSULIN (HCC): ICD-10-CM

## 2023-11-26 RX ORDER — GLIPIZIDE 5 MG/1
5 TABLET ORAL
Qty: 180 TABLET | Refills: 0 | Status: SHIPPED | OUTPATIENT
Start: 2023-11-26

## 2023-12-01 NOTE — TELEPHONE ENCOUNTER
Please review; protocol failed. Requested Prescriptions   Pending Prescriptions Disp Refills    CYCLOBENZAPRINE 10 MG Oral Tab [Pharmacy Med Name: CYCLOBENZAPRINE 10 MG TABLET] 180 tablet 1     Sig: Take 1 tablet (10 mg total) by mouth every 12 (twelve) hours as needed for Muscle spasms. There is no refill protocol information for this order        Recent Outpatient Visits              3 months ago Type 2 diabetes mellitus with both eyes affected by mild nonproliferative retinopathy without macular edema, without long-term current use of insulin [facility])    [address], [address] [facility], Alix Orellana MD    Office Visit    3 months ago Essential hypertension with goal blood pressure less than 130/85    [facility], Deena Xiao MD    Office Visit    6 months ago Type 2 diabetes mellitus with both eyes affected by mild nonproliferative retinopathy without macular edema, without long-term current use of insulin [facility])    [facility], [address] [facility], Alix Orellana MD    Office Visit    6 months ago Essential hypertension with goal blood pressure less than 130/85    Majo Us MD    Office Visit    10 months ago Hypothyroidism, unspecified type    [facility], Alix Orellana MD    Office Visit          Future Appointments         Provider Department Appt Notes    In 1 week Ivet Bradshaw MD [address], [address] [place] [address] Return in about 4 months (around 12/10/2023).     In 2 months King [facility] [address] [place] [address] r/s from 11/29/23 - per pt daugther please use [facility]

## 2023-12-02 RX ORDER — CYCLOBENZAPRINE HCL 10 MG
10 TABLET ORAL EVERY 12 HOURS PRN
Qty: 180 TABLET | Refills: 1 | Status: SHIPPED | OUTPATIENT
Start: 2023-12-02 | End: 2024-05-30

## 2023-12-12 ENCOUNTER — OFFICE VISIT (OUTPATIENT)
Facility: CLINIC | Age: 88
End: 2023-12-12

## 2023-12-12 VITALS
HEIGHT: 67 IN | SYSTOLIC BLOOD PRESSURE: 120 MMHG | DIASTOLIC BLOOD PRESSURE: 72 MMHG | HEART RATE: 82 BPM | OXYGEN SATURATION: 95 % | BODY MASS INDEX: 29.35 KG/M2 | WEIGHT: 187 LBS | RESPIRATION RATE: 14 BRPM

## 2023-12-12 DIAGNOSIS — I10 ESSENTIAL HYPERTENSION WITH GOAL BLOOD PRESSURE LESS THAN 130/85: Primary | ICD-10-CM

## 2023-12-12 DIAGNOSIS — E03.9 HYPOTHYROIDISM, UNSPECIFIED TYPE: ICD-10-CM

## 2023-12-12 DIAGNOSIS — E11.3293 TYPE 2 DIABETES MELLITUS WITH BOTH EYES AFFECTED BY MILD NONPROLIFERATIVE RETINOPATHY WITHOUT MACULAR EDEMA, WITHOUT LONG-TERM CURRENT USE OF INSULIN (HCC): ICD-10-CM

## 2023-12-12 DIAGNOSIS — E78.5 DYSLIPIDEMIA: ICD-10-CM

## 2023-12-12 PROCEDURE — 99214 OFFICE O/P EST MOD 30 MIN: CPT | Performed by: INTERNAL MEDICINE

## 2023-12-12 PROCEDURE — 1126F AMNT PAIN NOTED NONE PRSNT: CPT | Performed by: INTERNAL MEDICINE

## 2023-12-31 DIAGNOSIS — E11.65 TYPE 2 DIABETES MELLITUS WITH HYPERGLYCEMIA, WITHOUT LONG-TERM CURRENT USE OF INSULIN (HCC): ICD-10-CM

## 2024-01-02 RX ORDER — METFORMIN HYDROCHLORIDE 500 MG/1
500 TABLET, EXTENDED RELEASE ORAL 2 TIMES DAILY WITH MEALS
Qty: 180 TABLET | Refills: 0 | Status: SHIPPED | OUTPATIENT
Start: 2024-01-02

## 2024-01-16 ENCOUNTER — MED REC SCAN ONLY (OUTPATIENT)
Dept: INTERNAL MEDICINE CLINIC | Facility: CLINIC | Age: 89
End: 2024-01-16

## 2024-02-02 DIAGNOSIS — E11.65 TYPE 2 DIABETES MELLITUS WITH HYPERGLYCEMIA, WITHOUT LONG-TERM CURRENT USE OF INSULIN (HCC): ICD-10-CM

## 2024-02-02 RX ORDER — METFORMIN HYDROCHLORIDE 500 MG/1
1000 TABLET, EXTENDED RELEASE ORAL 2 TIMES DAILY WITH MEALS
Qty: 360 TABLET | Refills: 0 | OUTPATIENT
Start: 2024-02-02

## 2024-02-21 RX ORDER — LISINOPRIL 10 MG/1
10 TABLET ORAL DAILY
Qty: 90 TABLET | Refills: 3 | Status: SHIPPED | OUTPATIENT
Start: 2024-02-21

## 2024-02-21 NOTE — TELEPHONE ENCOUNTER
Please call patient to remind to complete labs before next visit on 04/16/2024    Office visit note from 12/12/2023  Check labs before next visit  - CBC, Platelet; No Differential; Future  - Comp Metabolic Panel (14); Future  - Hemoglobin A1C; Future  - Lipid Panel; Future  - TSH W Reflex To Free T4; Future  - Microalb/Creat Ratio, Random Urine; Future

## 2024-02-21 NOTE — TELEPHONE ENCOUNTER
Please review; protocol failed/No Protocol    Office visit note:   Check labs before next visit  - CBC, Platelet; No Differential; Future  - Comp Metabolic Panel (14); Future  - Hemoglobin A1C; Future  - Lipid Panel; Future  - TSH W Reflex To Free T4; Future  - Microalb/Creat Ratio, Random Urine; Future    Calling patient to remind about labs to be completed before next visit on 04/16/2024   Requested Prescriptions   Pending Prescriptions Disp Refills    LISINOPRIL 10 MG Oral Tab [Pharmacy Med Name: LISINOPRIL 10 MG TABLET] 90 tablet 3     Sig: TAKE 1 TABLET BY MOUTH EVERY DAY       Hypertension Medications Protocol Failed - 2/19/2024  9:13 AM        Failed - EGFRCR or GFRNAA > 50     GFR Evaluation  EGFRCR: 42 , resulted on 8/21/2023          Passed - CMP or BMP in past 12 months        Passed - Last BP reading less than 140/90     BP Readings from Last 1 Encounters:   12/12/23 120/72               Passed - In person appointment or virtual visit in the past 12 mos or appointment in next 3 mos     Recent Outpatient Visits              2 months ago Essential hypertension with goal blood pressure less than 130/85    [facility]    [facility] Visit    5 months ago Type 2 diabetes mellitus with both eyes affected by mild nonproliferative retinopathy without macular edema, without long-term current use of insulin ([facility])    [facility]    Office Visit    6 months ago Essential hypertension with goal blood pressure less than 130/85    [facility]    Office Visit    9 months ago Type 2 diabetes mellitus with both eyes affected by mild nonproliferative retinopathy without macular edema, without long-term current use of insulin ([facility])    [facility]    Office Visit     9 months ago Essential hypertension with goal blood pressure less than 130/85    [facility] [place]    Office Visit          Future Appointments         Provider Department Appt Notes    In 1 month Mirza Gates MD [facility]                   Future Appointments         Provider Department Appt Notes    In 1 month Mirza Gates MD [facility] [place]           Recent Outpatient Visits              2 months ago Essential hypertension with goal blood pressure less than 130/85    [facility]    Office Visit    5 months ago Type 2 diabetes mellitus with both eyes affected by mild nonproliferative retinopathy without macular edema, without long-term current use of insulin ([facility])    [facility], Lashawn Santiago MD    Office Visit    6 months ago Essential hypertension with goal blood pressure less than 130/85    [facility], Mirza Andre MD    Office Visit    9 months ago Type 2 diabetes mellitus with both eyes affected by mild nonproliferative retinopathy without macular edema, without long-term current use of insulin ([facility])    [facility], Lashawn Santiago MD    Office Visit    9 months ago Essential hypertension with goal blood pressure less than 130/85    [facility], Mirza Andre MD    Office Visit

## 2024-02-21 NOTE — TELEPHONE ENCOUNTER
Spoke to the daughter and inform her that patient needs labs done. She stating she will take him to have them done.    Prescription pending

## 2024-02-23 ENCOUNTER — LAB ENCOUNTER (OUTPATIENT)
Dept: LAB | Age: 89
End: 2024-02-23
Attending: INTERNAL MEDICINE
Payer: MEDICARE

## 2024-02-23 DIAGNOSIS — E11.3293 TYPE 2 DIABETES MELLITUS WITH BOTH EYES AFFECTED BY MILD NONPROLIFERATIVE RETINOPATHY WITHOUT MACULAR EDEMA, WITHOUT LONG-TERM CURRENT USE OF INSULIN (HCC): ICD-10-CM

## 2024-02-23 DIAGNOSIS — I10 ESSENTIAL HYPERTENSION WITH GOAL BLOOD PRESSURE LESS THAN 130/85: ICD-10-CM

## 2024-02-23 DIAGNOSIS — R20.0 NUMBNESS: ICD-10-CM

## 2024-02-23 DIAGNOSIS — E55.9 VITAMIN D DEFICIENCY: ICD-10-CM

## 2024-02-23 LAB
ALBUMIN SERPL-MCNC: 4.5 G/DL (ref 3.2–4.8)
ALBUMIN/GLOB SERPL: 1.8 {RATIO} (ref 1–2)
ALP LIVER SERPL-CCNC: 98 U/L
ALT SERPL-CCNC: 13 U/L
ANION GAP SERPL CALC-SCNC: 3 MMOL/L (ref 0–18)
AST SERPL-CCNC: 15 U/L (ref ?–34)
BILIRUB SERPL-MCNC: 0.5 MG/DL (ref 0.2–1.1)
BUN BLD-MCNC: 20 MG/DL (ref 9–23)
BUN/CREAT SERPL: 15.4 (ref 10–20)
CALCIUM BLD-MCNC: 9.8 MG/DL (ref 8.7–10.4)
CHLORIDE SERPL-SCNC: 107 MMOL/L (ref 98–112)
CHOLEST SERPL-MCNC: 139 MG/DL (ref ?–200)
CO2 SERPL-SCNC: 27 MMOL/L (ref 21–32)
CREAT BLD-MCNC: 1.3 MG/DL
CREAT UR-SCNC: 55.2 MG/DL
DEPRECATED RDW RBC AUTO: 43.1 FL (ref 35.1–46.3)
EGFRCR SERPLBLD CKD-EPI 2021: 53 ML/MIN/1.73M2 (ref 60–?)
ERYTHROCYTE [DISTWIDTH] IN BLOOD BY AUTOMATED COUNT: 14 % (ref 11–15)
EST. AVERAGE GLUCOSE BLD GHB EST-MCNC: 169 MG/DL (ref 68–126)
FASTING PATIENT LIPID ANSWER: YES
FASTING STATUS PATIENT QL REPORTED: YES
GLOBULIN PLAS-MCNC: 2.5 G/DL (ref 2.8–4.4)
GLUCOSE BLD-MCNC: 159 MG/DL (ref 70–99)
HBA1C MFR BLD: 7.5 % (ref ?–5.7)
HCT VFR BLD AUTO: 36.9 %
HDLC SERPL-MCNC: 40 MG/DL (ref 40–59)
HGB BLD-MCNC: 12.2 G/DL
LDLC SERPL CALC-MCNC: 82 MG/DL (ref ?–100)
MCH RBC QN AUTO: 27.9 PG (ref 26–34)
MCHC RBC AUTO-ENTMCNC: 33.1 G/DL (ref 31–37)
MCV RBC AUTO: 84.4 FL
MICROALBUMIN UR-MCNC: 0.8 MG/DL
MICROALBUMIN/CREAT 24H UR-RTO: 14.5 UG/MG (ref ?–30)
NONHDLC SERPL-MCNC: 99 MG/DL (ref ?–130)
OSMOLALITY SERPL CALC.SUM OF ELEC: 290 MOSM/KG (ref 275–295)
PLATELET # BLD AUTO: 184 10(3)UL (ref 150–450)
POTASSIUM SERPL-SCNC: 4.8 MMOL/L (ref 3.5–5.1)
PROT SERPL-MCNC: 7 G/DL (ref 5.7–8.2)
RBC # BLD AUTO: 4.37 X10(6)UL
SODIUM SERPL-SCNC: 137 MMOL/L (ref 136–145)
T4 FREE SERPL-MCNC: 1 NG/DL (ref 0.8–1.7)
TRIGL SERPL-MCNC: 92 MG/DL (ref 30–149)
TSI SER-ACNC: 7.36 MIU/ML (ref 0.55–4.78)
VIT B12 SERPL-MCNC: 309 PG/ML (ref 211–911)
VIT D+METAB SERPL-MCNC: 9.7 NG/ML (ref 30–100)
VLDLC SERPL CALC-MCNC: 14 MG/DL (ref 0–30)
WBC # BLD AUTO: 5.2 X10(3) UL (ref 4–11)

## 2024-02-23 PROCEDURE — 83036 HEMOGLOBIN GLYCOSYLATED A1C: CPT

## 2024-02-23 PROCEDURE — 80053 COMPREHEN METABOLIC PANEL: CPT

## 2024-02-23 PROCEDURE — 82043 UR ALBUMIN QUANTITATIVE: CPT

## 2024-02-23 PROCEDURE — 85027 COMPLETE CBC AUTOMATED: CPT

## 2024-02-23 PROCEDURE — 36415 COLL VENOUS BLD VENIPUNCTURE: CPT

## 2024-02-23 PROCEDURE — 80061 LIPID PANEL: CPT

## 2024-02-23 PROCEDURE — 82306 VITAMIN D 25 HYDROXY: CPT

## 2024-02-23 PROCEDURE — 84443 ASSAY THYROID STIM HORMONE: CPT

## 2024-02-23 PROCEDURE — 82607 VITAMIN B-12: CPT

## 2024-02-23 PROCEDURE — 84439 ASSAY OF FREE THYROXINE: CPT

## 2024-02-23 PROCEDURE — 82570 ASSAY OF URINE CREATININE: CPT

## 2024-02-24 DIAGNOSIS — E03.9 HYPOTHYROIDISM, UNSPECIFIED TYPE: ICD-10-CM

## 2024-02-24 DIAGNOSIS — R79.89 ELEVATED TSH: Primary | ICD-10-CM

## 2024-02-25 DIAGNOSIS — E11.3293 TYPE 2 DIABETES MELLITUS WITH BOTH EYES AFFECTED BY MILD NONPROLIFERATIVE RETINOPATHY WITHOUT MACULAR EDEMA, WITHOUT LONG-TERM CURRENT USE OF INSULIN (HCC): ICD-10-CM

## 2024-02-26 RX ORDER — GLIPIZIDE 5 MG/1
5 TABLET ORAL
Qty: 180 TABLET | Refills: 0 | Status: SHIPPED | OUTPATIENT
Start: 2024-02-26

## 2024-03-29 ENCOUNTER — LAB ENCOUNTER (OUTPATIENT)
Dept: LAB | Age: 89
End: 2024-03-29
Attending: NURSE PRACTITIONER
Payer: MEDICARE

## 2024-03-29 DIAGNOSIS — R79.89 ELEVATED TSH: ICD-10-CM

## 2024-03-29 DIAGNOSIS — E03.9 HYPOTHYROIDISM, UNSPECIFIED TYPE: ICD-10-CM

## 2024-03-29 LAB
T4 FREE SERPL-MCNC: 1.1 NG/DL (ref 0.8–1.7)
TSI SER-ACNC: 8.11 MIU/ML (ref 0.55–4.78)

## 2024-03-29 PROCEDURE — 84439 ASSAY OF FREE THYROXINE: CPT

## 2024-03-29 PROCEDURE — 84443 ASSAY THYROID STIM HORMONE: CPT

## 2024-03-29 PROCEDURE — 36415 COLL VENOUS BLD VENIPUNCTURE: CPT

## 2024-03-31 ENCOUNTER — TELEPHONE (OUTPATIENT)
Dept: ENDOCRINOLOGY CLINIC | Facility: CLINIC | Age: 89
End: 2024-03-31

## 2024-03-31 NOTE — TELEPHONE ENCOUNTER
----- Message from JOSH Lu sent at 3/30/2024  9:27 PM CDT -----  Please call patient.  He is following up his thyroid lab with Dr. Mazariegos    Will forward this lab to Dr. Delilah Collado DNP

## 2024-04-09 ENCOUNTER — OFFICE VISIT (OUTPATIENT)
Dept: ENDOCRINOLOGY CLINIC | Facility: CLINIC | Age: 89
End: 2024-04-09

## 2024-04-09 DIAGNOSIS — E78.5 DYSLIPIDEMIA: ICD-10-CM

## 2024-04-09 DIAGNOSIS — I10 ESSENTIAL HYPERTENSION WITH GOAL BLOOD PRESSURE LESS THAN 130/85: ICD-10-CM

## 2024-04-09 DIAGNOSIS — E55.9 VITAMIN D DEFICIENCY: ICD-10-CM

## 2024-04-09 DIAGNOSIS — E11.3293 TYPE 2 DIABETES MELLITUS WITH BOTH EYES AFFECTED BY MILD NONPROLIFERATIVE RETINOPATHY WITHOUT MACULAR EDEMA, WITHOUT LONG-TERM CURRENT USE OF INSULIN (HCC): Primary | ICD-10-CM

## 2024-04-09 DIAGNOSIS — E03.9 HYPOTHYROIDISM, UNSPECIFIED TYPE: ICD-10-CM

## 2024-04-09 LAB
GLUCOSE BLOOD: 151
HEMOGLOBIN A1C: 7.1 % (ref 4.3–5.6)
TEST STRIP LOT #: NORMAL NUMERIC

## 2024-04-09 PROCEDURE — 82947 ASSAY GLUCOSE BLOOD QUANT: CPT | Performed by: INTERNAL MEDICINE

## 2024-04-09 PROCEDURE — 83036 HEMOGLOBIN GLYCOSYLATED A1C: CPT | Performed by: INTERNAL MEDICINE

## 2024-04-09 PROCEDURE — 99214 OFFICE O/P EST MOD 30 MIN: CPT | Performed by: INTERNAL MEDICINE

## 2024-04-09 PROCEDURE — 96127 BRIEF EMOTIONAL/BEHAV ASSMT: CPT | Performed by: INTERNAL MEDICINE

## 2024-04-09 NOTE — PROGRESS NOTES
Name: Morteza Tubbs  Date: 4/09/24    Referring Physician: No ref. provider found    Patient here to follow up on Type 2 diabetes as well as Hypothyroidism    HISTORY OF PRESENT ILLNESS   Morteza Tubbs is a 89 year old male who presents for hypothyroidism for follow up.    At the first initial visit, he came to see me for a suppressed TSH, that he had a couple of months before seeing me. He was largely asymptomatic, and we basically just repeated and followed thyroid function tests.     After a few visits, we found that his TSH had increased and FT4 had decreased. He was started on levothyroxine 25mcg and asked to follow up in 3-4 months. We then increased his levothyroxine to 50mcg and he has been doing well on this. At the last visit, we continued him on this and his most recent TSH is within normal limits.     He also has type 2 diabetes and had been on metformin ER 1g PO bid and glimepiride 2mg PO bid for many years and recently, his HbA1c increased by 1 point. At the last visit, I had changed his glimepiride for glipizide 5mg PO bid. He eats healthy and exercises.     He is now taking metformin ER 500mg PO bid and glipizide 5mg PO bid.   Blood Sugar- 151; HbA1c- 7.1    His most recent TSH is elevated. I have confirmed that he is taking the levothyroxine in the correct manner.     Medical History:   Past Medical History:    Acquired hypothyroidism    Acute right-sided low back pain with right-sided sciatica    Acute URI    Diabetes (HCC)    Greater trochanteric bursitis of both hips    Lumbar radiculopathy, acute    Lumbar trigger point syndrome    Obesity, unspecified    Osteoarthrosis, unspecified whether generalized or localized, unspecified site    Physical exam, annual    Right shoulder pain    Special screening for malignant neoplasm of prostate    Strain of gluteus medius    Subclinical hyperthyroidism    Subclinical hyperthyroidism    Tubular adenoma of colon    per ng polypectomy    Unspecified  essential hypertension       Surgical History:   Past Surgical History:   Procedure Laterality Date    Electrocardiogram, complete  10/29/13    scanned into media tab    Other surgical history Right        Family History:  Family History   Problem Relation Age of Onset    Heart Disorder Father     Heart Disorder Mother     Diabetes Brother     Lipids Other     Heart Disease Other        Social History:   Social History     Socioeconomic History    Marital status:    Tobacco Use    Smoking status: Former     Current packs/day: 0.00     Average packs/day: 0.3 packs/day for 50.0 years (12.5 ttl pk-yrs)     Types: Cigarettes     Start date: 1969     Quit date: 2019     Years since quittin.4    Smokeless tobacco: Never   Vaping Use    Vaping status: Never Used   Substance and Sexual Activity    Alcohol use: Yes     Alcohol/week: 2.5 standard drinks of alcohol     Types: 3 Glasses of wine per week     Comment: socially    Drug use: No   Other Topics Concern    Caffeine Concern Yes     Comment: 1cup coffee    Exercise No   Social History Narrative    The patient does not use an assistive device..      The patient does live in a home with stairs.       Medications:     Current Outpatient Medications:     lisinopril 10 MG Oral Tab, Take 1 tablet (10 mg total) by mouth daily., Disp: 90 tablet, Rfl: 3    cyclobenzaprine 10 MG Oral Tab, Take 1 tablet (10 mg total) by mouth every 12 (twelve) hours as needed for Muscle spasms., Disp: 180 tablet, Rfl: 1    simvastatin 20 MG Oral Tab, Take 1 tablet (20 mg total) by mouth nightly., Disp: 90 tablet, Rfl: 3    Blood Glucose Monitoring Suppl (ACCU-CHEK GUIDE) w/Device Does not apply Kit, Test 3x daily, Disp: 1 kit, Rfl: 0    gabapentin 300 MG Oral Cap, Take 1 capsule (300 mg total) by mouth 3 (three) times daily. (Patient not taking: Reported on 2024), Disp: 90 capsule, Rfl: 2    Accu-Chek Softclix Lancets Does not apply Misc, Test 3x daily, Disp: 300 each,  Rfl: 1    Glucose Blood (ACCU-CHEK GUIDE) In Vitro Strip, Use to test blood sugar readings 3 times a day. Dx Code:E11.3293, Disp: 300 strip, Rfl: 1    metFORMIN  MG Oral Tablet 24 Hr, Take 1 tablet (500 mg total) by mouth 2 (two) times daily with meals., Disp: 180 tablet, Rfl: 1    metFORMIN  MG Oral Tablet 24 Hr, Take 1 tablet (500 mg total) by mouth 2 (two) times daily with meals., Disp: 180 tablet, Rfl: 1    glipiZIDE 5 MG Oral Tab, Take 1 tablet (5 mg total) by mouth 2 (two) times daily before meals., Disp: 180 tablet, Rfl: 1    levothyroxine 75 MCG Oral Tab, Take 1 tablet (75 mcg total) by mouth before breakfast., Disp: 90 tablet, Rfl: 1     Allergies:   No Known Allergies    REVIEW OF SYSTEMS  Eyes: no change in vision  Neurologic: no headache, generalized or focal weakness or numbness.  Head: normal  ENT: normal  Lungs: no shortness of breath, wheezing or KIM  Cardiovascular:  no chest pain or palpitations  Gastrointestinal:  no abdominal pain, bowel movement problems  Musculoskeletal: no muscle pain or arthralgia  /Gyne: no frequency or discomfort while urinating  Psychiatric:  no acute distress, anxiety  or depression  Skin: normal moisturized skin    PHYSICAL EXAMINATION:  Vitals reviewed  General Appearance:  Alert, in no acute distress, well developed  Eyes: normal conjunctivae, sclera.  Neuro:  sensory grossly intact and motor grossly intact  Psychiatric:  oriented to time, self, and place  Nutritional:  no abnormal weight gain or loss  Bilateral barefoot skin diabetic exam is normal, visualized feet and the appearance is normal.  Bilateral monofilament/sensation of both feet is normal.  Pulsation pedal pulse exam of both lower legs/feet is normal as  well.    Labs:  Date  TSH  FT4 FT3 LT4  03/20/20 6.330  0.8  07/06/20 7.610  0.9   03/10/21 <0.005  1.6 3.98  05/12/21 2.50  0.8 2.23    08/20/21 5.90  0.9  03/18/22 6.530  0.8  09/23/22 9.670  0.7  01/27/23 9.780  0.8  25  05/16/23 5.180  0.9  50  08/02/23 1.910  1.0  50  02/23/24 7.360  1.0  50  03/29/24 8.113  1.1  50     02/23/24:  MAC- none  Lipid Panel- wnl  CMP- wnl       Imaging:  Reviewed      ASSESSMENT/PLAN: -This is a 89 year-old male patient with hypothyroidism and Type 2 diabetes.    - We discussed the diagnosis of hypothyroidism.  - We discussed the nonspecific nature of the symptoms of thyroid disease.  - We discussed that occasionally we require optimization of thyroid levels to TSH 1.0  - We also discussed that if symptoms do not improve on optimized levels then we can always consider combination treatment with T4 and T3  - I reminded the patient about the proper way in which to take the medication (an hour before any medications or food and that they may take two the next day if they forget to take a dose today) and they acknowledged understanding.    - I would like to increase levothyroxine dose from 50mcg to 75mcg PO qday and recheck TSH and FT4 in 6 months  - I would like to continue patient's glipizide 5mg PO bid and continue metformin ER 500mg PO bid and check CMP, Vitamin B12, D, lipid panel in 6 months  - I have advised him to check fasting and two hour post-prandial blood sugars    Return to clinic in 3 months      Prior to this encounter, I spent over 15 minutes with preparing for the visit, including reviewing documents from other specialties as well as from PCP and going over test results. During the face to face encounter, I spent an additional 15 minutes which were determined for follow-up. Greater than 50% of the time was spent in counseling, anticipatory guidance, and coordination of care. Patient concerns were answered to the best of my knowledge.         4/09/24  Lashawn CONCEPCION  MD Delilah

## 2024-04-16 ENCOUNTER — OFFICE VISIT (OUTPATIENT)
Facility: CLINIC | Age: 89
End: 2024-04-16

## 2024-04-16 VITALS
HEART RATE: 70 BPM | HEIGHT: 67 IN | DIASTOLIC BLOOD PRESSURE: 64 MMHG | OXYGEN SATURATION: 96 % | SYSTOLIC BLOOD PRESSURE: 116 MMHG | WEIGHT: 198.81 LBS | BODY MASS INDEX: 31.2 KG/M2 | RESPIRATION RATE: 16 BRPM

## 2024-04-16 DIAGNOSIS — M51.37 DDD (DEGENERATIVE DISC DISEASE), LUMBOSACRAL: ICD-10-CM

## 2024-04-16 DIAGNOSIS — H81.10 BENIGN PAROXYSMAL POSITIONAL VERTIGO, UNSPECIFIED LATERALITY: ICD-10-CM

## 2024-04-16 DIAGNOSIS — E78.5 DYSLIPIDEMIA: ICD-10-CM

## 2024-04-16 DIAGNOSIS — E11.65 TYPE 2 DIABETES MELLITUS WITH HYPERGLYCEMIA, WITHOUT LONG-TERM CURRENT USE OF INSULIN (HCC): ICD-10-CM

## 2024-04-16 DIAGNOSIS — Z98.890 S/P LUMBAR LAMINECTOMY: ICD-10-CM

## 2024-04-16 DIAGNOSIS — Z53.20 SCREENING DECLINED BY PATIENT: ICD-10-CM

## 2024-04-16 DIAGNOSIS — I10 ESSENTIAL HYPERTENSION WITH GOAL BLOOD PRESSURE LESS THAN 130/85: Primary | ICD-10-CM

## 2024-04-16 DIAGNOSIS — R91.1 LUNG NODULE: ICD-10-CM

## 2024-04-16 DIAGNOSIS — E55.9 VITAMIN D DEFICIENCY: ICD-10-CM

## 2024-04-16 DIAGNOSIS — F17.200 TOBACCO USE DISORDER: ICD-10-CM

## 2024-04-16 DIAGNOSIS — E04.1 THYROID NODULE: ICD-10-CM

## 2024-04-16 DIAGNOSIS — E11.3293 TYPE 2 DIABETES MELLITUS WITH BOTH EYES AFFECTED BY MILD NONPROLIFERATIVE RETINOPATHY WITHOUT MACULAR EDEMA, WITHOUT LONG-TERM CURRENT USE OF INSULIN (HCC): ICD-10-CM

## 2024-04-16 DIAGNOSIS — E03.9 HYPOTHYROIDISM, UNSPECIFIED TYPE: ICD-10-CM

## 2024-04-16 DIAGNOSIS — M48.061 LUMBAR FORAMINAL STENOSIS: ICD-10-CM

## 2024-04-16 PROBLEM — M47.816 LUMBAR SPONDYLOSIS: Status: RESOLVED | Noted: 2019-11-21 | Resolved: 2024-04-16

## 2024-04-16 PROBLEM — M43.16 SPONDYLOLISTHESIS AT L4-L5 LEVEL: Status: RESOLVED | Noted: 2019-11-11 | Resolved: 2024-04-16

## 2024-04-16 PROBLEM — M48.062 SPINAL STENOSIS OF LUMBAR REGION WITH NEUROGENIC CLAUDICATION: Status: RESOLVED | Noted: 2019-11-21 | Resolved: 2024-04-16

## 2024-04-16 PROBLEM — M51.36 BULGE OF LUMBAR DISC WITHOUT MYELOPATHY: Status: RESOLVED | Noted: 2019-11-21 | Resolved: 2024-04-16

## 2024-04-16 PROBLEM — R94.6 ABNORMAL THYROID FUNCTION TEST: Status: RESOLVED | Noted: 2021-08-24 | Resolved: 2024-04-16

## 2024-04-16 PROBLEM — Z88.7 ALLERGY TO PNEUMONIA VACCINE: Status: RESOLVED | Noted: 2017-10-20 | Resolved: 2024-04-16

## 2024-04-16 PROBLEM — M51.369 BULGE OF LUMBAR DISC WITHOUT MYELOPATHY: Status: RESOLVED | Noted: 2019-11-21 | Resolved: 2024-04-16

## 2024-04-16 PROBLEM — M51.16 LUMBAR DISC HERNIATION WITH RADICULOPATHY: Status: RESOLVED | Noted: 2019-11-21 | Resolved: 2024-04-16

## 2024-04-16 PROBLEM — R20.0 NUMBNESS: Status: RESOLVED | Noted: 2023-08-29 | Resolved: 2024-04-16

## 2024-04-16 PROCEDURE — 99213 OFFICE O/P EST LOW 20 MIN: CPT | Performed by: INTERNAL MEDICINE

## 2024-04-16 PROCEDURE — G0439 PPPS, SUBSEQ VISIT: HCPCS | Performed by: INTERNAL MEDICINE

## 2024-04-16 RX ORDER — GLIPIZIDE 5 MG/1
5 TABLET ORAL
Qty: 180 TABLET | Refills: 1 | Status: SHIPPED | OUTPATIENT
Start: 2024-04-16

## 2024-04-16 RX ORDER — METFORMIN HYDROCHLORIDE 500 MG/1
500 TABLET, EXTENDED RELEASE ORAL 2 TIMES DAILY WITH MEALS
Qty: 180 TABLET | Refills: 1 | Status: SHIPPED | OUTPATIENT
Start: 2024-04-16

## 2024-04-16 RX ORDER — LEVOTHYROXINE SODIUM 0.07 MG/1
75 TABLET ORAL
Qty: 90 TABLET | Refills: 1 | Status: SHIPPED | OUTPATIENT
Start: 2024-04-16

## 2024-04-16 NOTE — PROGRESS NOTES
Subjective:   Morteza Tubbs is a 89 year old male who presents for a Medicare Subsequent Annual Wellness visit (Pt already had Initial Annual Wellness) and scheduled follow up of multiple significant but stable problems and acute uncomplicated problem.   89-year-old gentleman here for Medicare annual wellness visit and also for concern of abnormal thyroid function test.  Apart from this, he has no other complaints.  No falls no abuse no depression reported.    History/Other:   Fall Risk Assessment:   He has been screened for Falls and is low risk.      Cognitive Assessment:   Abnormal  What day of the week is this?: Incorrect  What month is it?: Correct  What year is it?: Correct  Recall \"Ball\": Correct  Recall \"Flag\": Correct  Recall \"Tree\": Correct    Functional Ability/Status:   Morteza Tubbs has a completely normal functional assessment. See flowsheet for details.      Depression Screening (PHQ-2/PHQ-9): PHQ-2 SCORE: 0  , done 4/16/2024   If you checked off any problems, how difficult have these problems made it for you to do your work, take care of things at home, or get along with other people?: Not difficult at all    Last [place] Suicide Screening on 4/16/2024 was No Risk.     <5 minutes spent screening and counseling for depression    Advanced Directives:   He does NOT have a Living Will. [Do you have a living will?: No]  He does NOT have a Power of  for Health Care. [Do you have a healthcare power of ?: No]  Discussed Advance Care Planning with patient (and family/surrogate if present). Standard forms made available to patient in After Visit Summary.      Patient Active Problem List   Diagnosis    Tobacco use disorder    Type 2 diabetes mellitus with both eyes affected by mild nonproliferative retinopathy without macular edema, without long-term current use of insulin (HCC)    Essential hypertension with goal blood pressure less than 130/85    Benign paroxysmal positional vertigo     DDD (degenerative disc disease), lumbosacral    Lumbar foraminal stenosis    Lung nodule    S/P lumbar laminectomy    Thyroid nodule    Hypothyroidism    Dyslipidemia    Vitamin D deficiency    Screening declined by patient     Allergies:  He has No Known Allergies.    Current Medications:  Outpatient Medications Marked as Taking for the 4/16/24 encounter (Office Visit) with Mirza Gates MD   Medication Sig    metFORMIN  MG Oral Tablet 24 Hr Take 1 tablet (500 mg total) by mouth 2 (two) times daily with meals.    glipiZIDE 5 MG Oral Tab Take 1 tablet (5 mg total) by mouth 2 (two) times daily before meals.    levothyroxine 75 MCG Oral Tab Take 1 tablet (75 mcg total) by mouth before breakfast.    lisinopril 10 MG Oral Tab Take 1 tablet (10 mg total) by mouth daily.    cyclobenzaprine 10 MG Oral Tab Take 1 tablet (10 mg total) by mouth every 12 (twelve) hours as needed for Muscle spasms.    simvastatin 20 MG Oral Tab Take 1 tablet (20 mg total) by mouth nightly.    Glucose Blood (ACCU-CHEK GUIDE) In Vitro Strip Use to test blood sugar readings 3 times a day. Dx Code:E11.3293    Blood Glucose Monitoring Suppl (ACCU-CHEK GUIDE) w/Device Does not apply Kit Test 3x daily    Accu-Chek Softclix Lancets Does not apply Misc Test 3x daily       Medical History:  He  has a past medical history of Acquired hypothyroidism (6/3/2019), Acute right-sided low back pain with right-sided sciatica (10/15/2019), Acute URI (4/27/2016), Diabetes ([facility]), Greater trochanteric bursitis of both hips (11/5/2019), Lumbar radiculopathy, acute (11/21/2019), Lumbar trigger point syndrome (11/5/2019), Obesity, unspecified, Osteoarthrosis, unspecified whether generalized or localized, unspecified site, Physical exam, annual (3/9/2021), Right shoulder pain (11/24/2014), Special screening for malignant neoplasm of prostate (12/22/2011), Strain of gluteus medius (11/5/2019), Subclinical hyperthyroidism (4/5/2021), Subclinical hyperthyroidism  (2021), Tubular adenoma of colon (), and Unspecified essential hypertension.  Surgical History:  He  has a past surgical history that includes other surgical history (Right) and electrocardiogram, complete (10/29/13).   Family History:  His family history includes Diabetes in his brother; Heart Disease in an other family member; Heart Disorder in his father and mother; Lipids in an other family member.  Social History:  He  reports that he quit smoking about 4 years ago. His smoking use included cigarettes. He started smoking about 54 years ago. He has a 12.5 pack-year smoking history. He has never used smokeless tobacco. He reports current alcohol use of about 2.5 standard drinks of alcohol per week. He reports that he does not use drugs.    Tobacco:  He smoked tobacco in the past but quit greater than 12 months ago.  Social History     Tobacco Use   Smoking Status Former    Current packs/day: 0.00    Average packs/day: 0.3 packs/day for 50.0 years (12.5 ttl pk-yrs)    Types: Cigarettes    Start date: 1969    Quit date: 2019    Years since quittin.4   Smokeless Tobacco Never        CAGE Alcohol Screen:   CAGE screening score of 0 on 2024, showing low risk of alcohol abuse.      Patient Care Team:  Mirza Gates MD as PCP - General (Internal Medicine)    Review of Systems   Constitutional:  Negative for activity change, appetite change and fever.   HENT:  Negative for congestion and voice change.    Respiratory:  Negative for cough and shortness of breath.    Cardiovascular:  Negative for chest pain.   Gastrointestinal:  Negative for abdominal distention, abdominal pain and vomiting.   Genitourinary:  Negative for hematuria.   Skin:  Negative for wound.   Psychiatric/Behavioral:  Negative for behavioral problems.          Objective:   Physical Exam  Constitutional:       Appearance: Normal appearance.   HENT:      Head: Normocephalic.   Eyes:      Conjunctiva/sclera: Conjunctivae  normal.   Cardiovascular:      Rate and Rhythm: Normal rate and regular rhythm.      Heart sounds: Normal heart sounds. No murmur heard.  Pulmonary:      Effort: Pulmonary effort is normal.      Breath sounds: Normal breath sounds. No rhonchi or rales.   Abdominal:      General: Bowel sounds are normal.      Palpations: Abdomen is soft.      Tenderness: There is no abdominal tenderness.   Musculoskeletal:      Cervical back: Neck supple.      Right lower leg: No edema.      Left lower leg: No edema.   Skin:     General: Skin is warm and dry.   Neurological:      General: No focal deficit present.      Mental Status: He is alert and oriented to person, place, and time. Mental status is at baseline.   Psychiatric:         Mood and Affect: Mood normal.         Behavior: Behavior normal.         /64 (BP Location: Right arm, Patient Position: Sitting, Cuff Size: adult)   Pulse 70   Resp 16   Ht 5' 7\" (1.702 m)   Wt 198 lb 12.8 oz (90.2 kg)   SpO2 96%   BMI 31.14 kg/m²  Estimated body mass index is 31.14 kg/m² as calculated from the following:    Height as of this encounter: 5' 7\" (1.702 m).    Weight as of this encounter: 198 lb 12.8 oz (90.2 kg).    Medicare Hearing Assessment:   Hearing Screening    Time taken: 4/16/2024 10:50 AM  Screening Method: Questionnaire  I have a problem hearing over the telephone: No I have trouble following the conversations when two or more people are talking at the same time: No   I have trouble understanding things on the TV: No I have to strain to understand conversations: Sometimes   I have to worry about missing the telephone ring or doorbell: No I have trouble hearing conversations in a noisy background such as a crowded room or restaurant: No   I get confused about where sounds come from: No I misunderstand some words in a sentence and need to ask people to repeat themselves: Sometimes   I especially have trouble understanding the speech of women and children: No I have  trouble understanding the speaker in a large room such as at a meeting or place of Lutheran: No   Many people I talk to seem to mumble (or don't speak clearly): No People get annoyed because I misunderstand what they say: No   I misunderstand what others are saying and make inappropriate responses: No I avoid social activities because I cannot hear well and fear I will reply improperly: No   Family members and friends have told me they think I may have hearing loss: No                   Assessment & Plan:   Morteza Tubbs is a 89 year old male who presents for a Medicare Assessment.     1. Essential hypertension with goal blood pressure less than 130/85 (Primary) controlled  2. Type 2 diabetes mellitus with hyperglycemia, without long-term current use of insulin ([facility]) monitor A1c.  Up-to-date on foot exam.  He has completed eye exam.  Will try to obtain the report.  -     metFORMIN HCl ER; Take 1 tablet (500 mg total) by mouth 2 (two) times daily with meals.  Dispense: 180 tablet; Refill: 1  3. Type 2 diabetes mellitus with both eyes affected by mild nonproliferative retinopathy without macular edema, without long-term current use of insulin ([facility])-continue to follow-up with ophthalmology  -     glipiZIDE; Take 1 tablet (5 mg total) by mouth 2 (two) times daily before meals.  Dispense: 180 tablet; Refill: 1  4. Dyslipidemia continue statins  5. Lung nodule  Overview:  Last 1/22  Stable for more than 2 years  6. Thyroid nodule  Overview:  Impression   CONCLUSION:  1. 0.7 cm right TR 4 nodule, and 0.6 cm left TR 3 nodule.  Based on ACR guidelines, no additional follow-up required.       8. Vitamin D deficiency stable  9. Lumbar foraminal stenosis stable  10. DDD (degenerative disc disease), lumbosacral stable stable  11. S/P lumbar laminectomy stable  12. Tobacco use disorder counseled  13. Benign paroxysmal positional vertigo, stable with no recurrence unspecified laterality  14. Screening declined by  patient  Overview:  Decided to stop screenings     Additional evaluation apart from Medicare annual wellness visit    7. Hypothyroidism, unspecified type      TSH noted.  Will increase levothyroxine.  Other orders  -     Levothyroxine Sodium; Take 1 tablet (75 mcg total) by mouth before breakfast.  Dispense: 90 tablet; Refill: 1    The patient indicates understanding of these issues and agrees to the plan.  Reinforced healthy diet, lifestyle, and exercise.      No follow-ups on file.     Mirza Gates MD, 4/16/2024     Supplementary Documentation:   General Health:  In the past six months, have you lost more than 10 pounds without trying?: 2 - No  Has your appetite been poor?: No  Type of Diet: Diabetic  How does the patient maintain a good energy level?: Other;Daily Walks  How would you describe your daily physical activity?: Light  How would you describe your current health state?: Fair  How do you maintain positive mental well-being?: Visiting Friends;Visiting Family;Social Interaction  On a scale of 0 to 10, with 0 being no pain and 10 being severe pain, what is your pain level?: 0 - (None)  In the past six months, have you experienced urine leakage?: 0-No  At any time do you feel concerned for the safety/well-being of yourself and/or your children, in your home or elsewhere?: No  Have you had any immunizations at another office such as Influenza, Hepatitis B, Tetanus, or Pneumococcal?: No        Morteza Tubbs's SCREENING SCHEDULE   Tests on this list are recommended by your physician but may not be covered, or covered at this frequency, by your insurer.   Please check with your insurance carrier before scheduling to verify coverage.   PREVENTATIVE SERVICES FREQUENCY &  COVERAGE DETAILS LAST COMPLETION DATE   Diabetes Screening    Fasting Blood Sugar / Glucose    One screening every 12 months if never tested or if previously tested but not diagnosed with pre-diabetes   One screening every 6 months if  diagnosed with pre-diabetes Lab Results   Component Value Date     (H) 02/23/2024        Cardiovascular Disease Screening    Lipid Panel  Cholesterol  Lipoprotein (HDL)  Triglycerides Covered every 5 years for all Medicare beneficiaries without apparent signs or symptoms of cardiovascular disease Lab Results   Component Value Date    CHOLEST 139 02/23/2024    HDL 40 02/23/2024    LDL 82 02/23/2024    TRIG 92 02/23/2024         Electrocardiogram (EKG)   Covered if needed at Welcome to Medicare, and non-screening if indicated for medical reasons 01/13/2020      Ultrasound Screening for Abdominal Aortic Aneurysm (AAA) Covered once in a lifetime for one of the following risk factors    Men who are 65-75 years old and have ever smoked    Anyone with a family history -     Colorectal Cancer Screening  Covered for ages 50-85; only need ONE of the following:    Colonoscopy   Covered every 10 years    Covered every 2 years if patient is at high risk or previous colonoscopy was abnormal -    No recommendations at this time    Flexible Sigmoidoscopy   Covered every 4 years -    Fecal Occult Blood Test Covered annually -   Prostate Cancer Screening    Prostate-Specific Antigen (PSA) Annually No results found for: \"PSA\"  There are no preventive care reminders to display for this patient.   Immunizations    Influenza Covered once per flu season  Please get every year -  No recommendations at this time    Pneumococcal Each vaccine (Hlohpuh59 & [id number]) covered once after 65 Prevnar 13: 10/16/2017    [id number]: 12/09/2019     No recommendations at this time    Hepatitis B One screening covered for patients with certain risk factors   -  No recommendations at this time    Tetanus Toxoid Not covered by Medicare Part B unless medically necessary (cut with metal); may be covered with your pharmacy prescription benefits -    Tetanus, Diptheria and Pertusis TD and TDaP Not covered by Medicare Part B -  No recommendations at  this time    Zoster Not covered by Medicare Part B; may be covered with your pharmacy  prescription benefits -  Zoster Vaccines(1 of 2) Never done     Diabetes      Hemoglobin A1C Annually; if last result is elevated, may be asked to retest more frequently.    Medicare covers every 3 months Lab Results   Component Value Date     (H) 02/23/2024    A1C 7.1 (A) 04/09/2024       No recommendations at this time    Creat/alb ratio Annually Lab Results   Component Value Date    MICROALBCREA 14.5 02/23/2024       LDL Annually Lab Results   Component Value Date    LDL 82 02/23/2024       Dilated Eye Exam Annually Last Diabetic Eye Exam:  No data recorded  No data recorded       Annual Monitoring of Persistent Medications (ACE/ARB, digoxin diuretics, anticonvulsants)    Potassium Annually Lab Results   Component Value Date    K 4.8 02/23/2024         Creatinine   Annually Lab Results   Component Value Date    CREATSERUM 1.30 02/23/2024         BUN Annually Lab Results   Component Value Date    BUN 20 02/23/2024       Drug Serum Conc Annually No results found for: \"DIGOXIN\", \"DIG\", \"VALP\"

## 2024-04-18 NOTE — TELEPHONE ENCOUNTER
Endocrine Refill protocol for Metformin    Protocol Criteria:    -Appointment with Endocrinology completed in the last 6 months or scheduled in the next 3 months    -GFR greater than or equal to 40 in the past 12 months     -A1c result <8.5% in the past 6 months      Verify the above has been completed or scheduled in the appropriate timeline. If so can send a 90 day supply with 1 refill.      Last completed office visit: 4/9/24  Next scheduled Follow up: 10/9/24    Last GFR result:   Component      Latest Ref Rng 2/23/2024   EGFR      >=60 mL/min/1.73m2 53 (L)       Last A1c result:  Component      Latest Ref Rng 4/9/2024   HEMOGLOBIN A1C      4.3 - 5.6 % 7.1 !

## 2024-04-19 RX ORDER — METFORMIN HYDROCHLORIDE 500 MG/1
500 TABLET, EXTENDED RELEASE ORAL 2 TIMES DAILY WITH MEALS
Qty: 180 TABLET | Refills: 1 | Status: SHIPPED | OUTPATIENT
Start: 2024-04-19

## 2024-04-23 DIAGNOSIS — E11.3293 TYPE 2 DIABETES MELLITUS WITH BOTH EYES AFFECTED BY MILD NONPROLIFERATIVE RETINOPATHY WITHOUT MACULAR EDEMA, WITHOUT LONG-TERM CURRENT USE OF INSULIN (HCC): Primary | ICD-10-CM

## 2024-04-23 NOTE — TELEPHONE ENCOUNTER
Current Outpatient Medications:    Glucose Blood (ACCU-CHEK GUIDE) In Vitro Strip, Use to test blood sugar readings 3 times a day. Dx Code:E11.3293, Disp: 300 strip, Rfl: 0    Accu-Chek Softclix Lancets Does not apply Misc, Test 3x daily, Disp: 300 each, Rfl: 0

## 2024-04-24 ENCOUNTER — MED REC SCAN ONLY (OUTPATIENT)
Dept: INTERNAL MEDICINE CLINIC | Facility: CLINIC | Age: 89
End: 2024-04-24

## 2024-04-24 NOTE — TELEPHONE ENCOUNTER
Last prescribed by Dr. Mazariegos. Directing to correct pool. ([facility] endo please disregard, sent in error)

## 2024-04-25 RX ORDER — LANCETS
EACH MISCELLANEOUS
Qty: 300 EACH | Refills: 1 | Status: SHIPPED | OUTPATIENT
Start: 2024-04-25

## 2024-04-25 RX ORDER — BLOOD SUGAR DIAGNOSTIC
STRIP MISCELLANEOUS
Qty: 300 STRIP | Refills: 1 | Status: SHIPPED | OUTPATIENT
Start: 2024-04-25

## 2024-04-25 NOTE — TELEPHONE ENCOUNTER
Endocrine Refill protocol for Glucose testing supplies       Protocol Criteria: PASSED  Appointment with Endocrinology completed in the last 12 months or scheduled in the next 6 months     Verify appointment has been completed or scheduled in the appropriate timeline. If so can send a 90 day supply with 1 refill.        Last completed office visit: 4/9/24  Next scheduled Follow up: 10/9/24

## 2024-05-08 ENCOUNTER — TELEPHONE (OUTPATIENT)
Dept: ENDOCRINOLOGY CLINIC | Facility: CLINIC | Age: 89
End: 2024-05-08

## 2024-05-08 DIAGNOSIS — E11.3293 TYPE 2 DIABETES MELLITUS WITH BOTH EYES AFFECTED BY MILD NONPROLIFERATIVE RETINOPATHY WITHOUT MACULAR EDEMA, WITHOUT LONG-TERM CURRENT USE OF INSULIN (HCC): ICD-10-CM

## 2024-05-08 NOTE — TELEPHONE ENCOUNTER
Patient's daughter calling states pharmacy is needing diagnosis code and NPI for provider Dr Mazariegos. Please call. ([facility] in [place], [place])     Blood Glucose Monitoring Suppl (ACCU-CHEK GUIDE) w/Device Does not apply Kit       Accu-Chek Softclix Lancets Does not apply Misc

## 2024-05-10 DIAGNOSIS — E11.3293 TYPE 2 DIABETES MELLITUS WITH BOTH EYES AFFECTED BY MILD NONPROLIFERATIVE RETINOPATHY WITHOUT MACULAR EDEMA, WITHOUT LONG-TERM CURRENT USE OF INSULIN (HCC): ICD-10-CM

## 2024-05-10 RX ORDER — BLOOD SUGAR DIAGNOSTIC
STRIP MISCELLANEOUS
Qty: 100 STRIP | Refills: 1 | Status: SHIPPED | OUTPATIENT
Start: 2024-05-10

## 2024-05-10 NOTE — TELEPHONE ENCOUNTER
Daughter is following up it is urgent please follow up the wrong diagnosis code is on the prescription, daughter states to please include this information for Medicare and please call daughter when medicine is sent  # [phone]

## 2024-05-10 NOTE — TELEPHONE ENCOUNTER
Per pharmacist, Medicare will only cover strips enough to monitor his blood sugar only once a day since patient is not on insulin.     Spoke with daughter to notify her of order sent to pharmacy.

## 2024-05-11 NOTE — TELEPHONE ENCOUNTER
Endocrine Refill protocol for Glucose testing supplies       Protocol Criteria:  Appointment with Endocrinology completed in the last 12 months or scheduled in the next 6 months     Verify appointment has been completed or scheduled in the appropriate timeline. If so can send a 90 day supply with 1 refill.     Last completed office visit: 4/9/24  Next scheduled Follow up: 10/9/24

## 2024-05-13 RX ORDER — LANCETS
EACH MISCELLANEOUS
Qty: 300 EACH | Refills: 1 | Status: SHIPPED | OUTPATIENT
Start: 2024-05-13

## 2024-07-18 RX ORDER — CYCLOBENZAPRINE HCL 10 MG
10 TABLET ORAL EVERY 12 HOURS PRN
Qty: 180 TABLET | Refills: 1 | Status: SHIPPED | OUTPATIENT
Start: 2024-07-18 | End: 2025-01-14

## 2024-07-18 NOTE — TELEPHONE ENCOUNTER
Please review; protocol failed/No Protocol    Last Office Visit: 04/16/2024    Requested Prescriptions   Pending Prescriptions Disp Refills    CYCLOBENZAPRINE 10 MG Oral Tab [Pharmacy Med Name: CYCLOBENZAPRINE 10 MG TABLET] 180 tablet 1     Sig: Take 1 tablet (10 mg total) by mouth every 12 (twelve) hours as needed for Muscle spasms.       There is no refill protocol information for this order        Future Appointments         Provider Department Appt Notes    In 2 months Lashawn Mazariegos MD [facility] 6 months    In 3 months Mirza Gates MD [facility]           Recent Outpatient Visits              3 months ago Essential hypertension with goal blood pressure less than 130/85    [facility]    Office Visit    3 months ago Type 2 diabetes mellitus with both eyes affected by mild nonproliferative retinopathy without macular edema, without long-term current use of insulin ([facility])    [facility] Lashawn Mazariegos MD    Office Visit    7 months ago Essential hypertension with goal blood pressure less than 130/85    [facility] Mirza Gates MD    Office Visit    10 months ago Type 2 diabetes mellitus with both eyes affected by mild nonproliferative retinopathy without macular edema, without long-term current use of insulin ([facility])    [facility] Lashawn Mazariegos MD    Office Visit    11 months ago Essential hypertension with goal blood pressure less than 130/85    [facility]    Office Visit

## 2024-08-18 ENCOUNTER — APPOINTMENT (OUTPATIENT)
Dept: GENERAL RADIOLOGY | Facility: HOSPITAL | Age: 89
End: 2024-08-18
Attending: EMERGENCY MEDICINE
Payer: MEDICARE

## 2024-08-18 ENCOUNTER — HOSPITAL ENCOUNTER (EMERGENCY)
Facility: HOSPITAL | Age: 89
Discharge: HOME | End: 2024-08-18
Attending: EMERGENCY MEDICINE
Payer: MEDICARE

## 2024-08-18 VITALS
RESPIRATION RATE: 18 BRPM | OXYGEN SATURATION: 99 % | DIASTOLIC BLOOD PRESSURE: 63 MMHG | SYSTOLIC BLOOD PRESSURE: 125 MMHG | WEIGHT: 192 LBS | HEART RATE: 66 BPM | TEMPERATURE: 97 F | HEIGHT: 69 IN | BODY MASS INDEX: 28.44 KG/M2

## 2024-08-18 DIAGNOSIS — M10.9 ACUTE GOUT INVOLVING TOE OF LEFT FOOT, UNSPECIFIED CAUSE: Primary | ICD-10-CM

## 2024-08-18 LAB
ANION GAP SERPL CALC-SCNC: 6 MMOL/L (ref 0–18)
BASOPHILS # BLD AUTO: 0.02 X10(3) UL (ref 0–0.2)
BASOPHILS NFR BLD AUTO: 0.3 %
BUN BLD-MCNC: 22 MG/DL (ref 9–23)
BUN/CREAT SERPL: 18.2 (ref 10–20)
CALCIUM BLD-MCNC: 10.3 MG/DL (ref 8.7–10.4)
CHLORIDE SERPL-SCNC: 107 MMOL/L (ref 98–112)
CO2 SERPL-SCNC: 27 MMOL/L (ref 21–32)
CREAT BLD-MCNC: 1.21 MG/DL
DEPRECATED RDW RBC AUTO: 43.7 FL (ref 35.1–46.3)
EGFRCR SERPLBLD CKD-EPI 2021: 58 ML/MIN/1.73M2 (ref 60–?)
EOSINOPHIL # BLD AUTO: 0.08 X10(3) UL (ref 0–0.7)
EOSINOPHIL NFR BLD AUTO: 1.2 %
ERYTHROCYTE [DISTWIDTH] IN BLOOD BY AUTOMATED COUNT: 13.9 % (ref 11–15)
GLUCOSE BLD-MCNC: 142 MG/DL (ref 70–99)
HCT VFR BLD AUTO: 36.4 %
HGB BLD-MCNC: 11.7 G/DL
IMM GRANULOCYTES # BLD AUTO: 0.02 X10(3) UL (ref 0–1)
IMM GRANULOCYTES NFR BLD: 0.3 %
LYMPHOCYTES # BLD AUTO: 1.88 X10(3) UL (ref 1–4)
LYMPHOCYTES NFR BLD AUTO: 27.5 %
MCH RBC QN AUTO: 27.9 PG (ref 26–34)
MCHC RBC AUTO-ENTMCNC: 32.1 G/DL (ref 31–37)
MCV RBC AUTO: 86.9 FL
MONOCYTES # BLD AUTO: 0.58 X10(3) UL (ref 0.1–1)
MONOCYTES NFR BLD AUTO: 8.5 %
NEUTROPHILS # BLD AUTO: 4.25 X10 (3) UL (ref 1.5–7.7)
NEUTROPHILS # BLD AUTO: 4.25 X10(3) UL (ref 1.5–7.7)
NEUTROPHILS NFR BLD AUTO: 62.2 %
OSMOLALITY SERPL CALC.SUM OF ELEC: 296 MOSM/KG (ref 275–295)
PLATELET # BLD AUTO: 219 10(3)UL (ref 150–450)
POTASSIUM SERPL-SCNC: 4.7 MMOL/L (ref 3.5–5.1)
RBC # BLD AUTO: 4.19 X10(6)UL
SODIUM SERPL-SCNC: 140 MMOL/L (ref 136–145)
URATE SERPL-MCNC: 6.8 MG/DL
WBC # BLD AUTO: 6.8 X10(3) UL (ref 4–11)

## 2024-08-18 PROCEDURE — 99284 EMERGENCY DEPT VISIT MOD MDM: CPT

## 2024-08-18 PROCEDURE — 85025 COMPLETE CBC W/AUTO DIFF WBC: CPT | Performed by: EMERGENCY MEDICINE

## 2024-08-18 PROCEDURE — 84550 ASSAY OF BLOOD/URIC ACID: CPT | Performed by: EMERGENCY MEDICINE

## 2024-08-18 PROCEDURE — 36415 COLL VENOUS BLD VENIPUNCTURE: CPT

## 2024-08-18 PROCEDURE — 73660 X-RAY EXAM OF TOE(S): CPT | Performed by: EMERGENCY MEDICINE

## 2024-08-18 PROCEDURE — 80048 BASIC METABOLIC PNL TOTAL CA: CPT | Performed by: EMERGENCY MEDICINE

## 2024-08-18 RX ORDER — CEPHALEXIN 500 MG/1
500 CAPSULE ORAL 3 TIMES DAILY
Qty: 21 CAPSULE | Refills: 0 | Status: SHIPPED | OUTPATIENT
Start: 2024-08-18 | End: 2024-08-21

## 2024-08-18 RX ORDER — PREDNISONE 20 MG/1
20 TABLET ORAL DAILY
Qty: 5 TABLET | Refills: 0 | Status: SHIPPED | OUTPATIENT
Start: 2024-08-18 | End: 2024-08-19

## 2024-08-18 RX ORDER — HYDROCODONE BITARTRATE AND ACETAMINOPHEN 5; 325 MG/1; MG/1
1 TABLET ORAL EVERY 6 HOURS PRN
Qty: 10 TABLET | Refills: 0 | Status: SHIPPED | OUTPATIENT
Start: 2024-08-18 | End: 2024-08-19 | Stop reason: ALTCHOICE

## 2024-08-18 RX ORDER — CEPHALEXIN 500 MG/1
500 CAPSULE ORAL ONCE
Status: COMPLETED | OUTPATIENT
Start: 2024-08-18 | End: 2024-08-18

## 2024-08-18 RX ORDER — PREDNISONE 20 MG/1
40 TABLET ORAL ONCE
Status: COMPLETED | OUTPATIENT
Start: 2024-08-18 | End: 2024-08-18

## 2024-08-18 NOTE — ED INITIAL ASSESSMENT (HPI)
[facility]  Chief Complaint   Patient presents with     custodial Acute   [place] [facility] Record Number: [id number]. Place of Service where encounter took place:  [facility]  ([facility]) [[id number]]. HPI: Citlalli Villarreal  is 76 year old (1942), who is being seen today for a federally mandated E/M visit.  HPI information obtained from: facility chart records, facility staff, patient report, [facility] chart review and Care Everywhere [facility] chart review. Today's concerns are:    NSTEMI (non-ST elevated myocardial infarction) (H)  Chronic combined systolic and diastolic congestive heart failure (H)  Chronic atrial fibrillation  Essential hypertension  Dementia with behavioral disturbance, unspecified dementia type (H)  Hospice care patient  Abdominal aortic aneurysm (AAA) without rupture (H)  Frequent Falls  Gross Hematuria  Last visit we made many changes to regimen including the discontinuation of her anticoagulant, GDR of Clonidine/Digoxin, UA/UC, and start of Coreg. We are closely monitoring and following-up today on BP/HR trend. Patient denies CP, SOB, dizziness, headache, nausea. UA resulted positive for abnormalities as noted below, UC showed no growth. Patient does not allow for nursing to assist her with personal cares much, and therefore nursing is unclear if blood is solely urinary, vs GI/vaginal. CBC showed no leukocytosis or acute blood loss.   BPs:  12/6/2019 09:23 153/102 mmHg   12/6/2019 01:10 127/98 mmHg   12/5/2019 08:47 138/80 mmHg   12/5/2019 02:30 108/81 mmHg   12/4/2019 23:49 117/79 mmHg   12/4/2019 18:46 98/68 mmHg   12/4/2019 10:15 120/80 mmHg  HRs:  12/6/2019 01:10 94 bpm   12/5/2019 08:49 69 bpm  12/5/2019 02:30 56 bpm   12/4/2019 23:49 53 bpm  12/4/2019 18:46 96 bpm     PMH/PSH reviewed in EPIC today.  ROS: Limited secondary to cognitive impairment but today pt reports the above and 4 point ROS including Respiratory, CV, GI and , other than that noted in  Pt hit a bed frame with his left great toe on Thursday. Redness seen, pedal pulse felt. He is diabetic.    the HPI, is negative.    Vitals:  BP (!) 153/102   Pulse 94   Temp 97.6  F (36.4  C)   Resp 16   Wt 56.7 kg (125 lb)   SpO2 100%   BMI 19.58 kg/m    Body mass index is 19.58 kg/m .  Exam:  GENERAL APPEARANCE: Alert, in no distress, cooperative.   ENT: Mouth/posterior oropharynx intact w/ moist mucous membranes, hearing acuity [place].  EYES: EOM, conjunctivae, lids, pupils and irises normal, PERRL2.   RESP: Respiratory effort good, no respiratory distress, Lung sounds clear. On RA.   CV: Auscultation of heart reveals S1, S2, rate-controlled and rhythm irregular, 2/6 systolic murmur, no rub or gallop, Edema 0+ BLE. Peripheral pulses are 2+.  ABDOMEN: Normal bowel sounds, soft, non-tender abdomen, and no masses palpated.  SKIN: Inspection/Palpation of skin and subcutaneous tissue baseline w/ fragility. No wounds/rashes noted.   NEURO: CN II-XII at patient's baseline, sensation baseline PPS.  PSYCH: Insight, judgement, and memory are baseline, affect and mood are flat/engaged.    Lab/Diagnostic data:       ASSESSMENT/PLAN  NSTEMI (non-ST elevated myocardial infarction) (H)  Chronic combined systolic and diastolic congestive heart failure (H)  Chronic atrial fibrillation  Essential hypertension  Dementia with behavioral disturbance, unspecified dementia type (H)  Hospice care patient  Abdominal aortic aneurysm (AAA) without rupture (H)  Frequent Falls  Gross Hematuria  Acute-on-chronic. Tenuous. Will continue GDR of both Clonidine and Digoxin. If rate-control becomes problematic, will increase BB, which may also assist w/ BP and CP control. We will discontinue MVI and recheck labs next week.  Given unknown source of bleeding, we will increase PPI for better coverage (potential for GIB). Follow-up w/in 1 week or as needed.    Orders written by provider at facility and transcribed by : Bubba Diaz  1. On 12/8/19 Discontinue clonidine.  2. On 12/9/19 Discontinue digoxin.  3. Increase omeprazole to 20 mg PO  every day Dx: PPI PPX  4. discontinue MVI  5. CBC and BMP x1 on 12/11 Dx: Hematuria    Electronically signed by:  Dr. Jennifer Richards, APRN CNP DNP

## 2024-08-18 NOTE — DISCHARGE INSTRUCTIONS
Keflex, Prednisone, Norco as prescribed. Frequently check blood sugars and call PMD for medication adjustment should sugars be rising.

## 2024-08-18 NOTE — ED QUICK NOTES
Patient her for left big toe/foot injury. Patient stated he bumped hit his toe on a steel chair. Patient is a diabetic. Injury occurred on Thursday. Patient left big toe has swelling and redness. Positive pedal pulse. Patient has on and off pain.

## 2024-08-18 NOTE — ED PROVIDER NOTES
Patient Seen in: [facility] Emergency Department      History     Chief Complaint   Patient presents with    Toe Pain     Stated Complaint: Left Toe Pain; Swelling    Subjective:   HPI    Patient presents emergency department with a left great toe pain and swelling.  He states that 3 days ago he accidentally kicked something metal with his left great toe.  Over the last 48 hours he has developed pain and swelling.  There is no fever or chills.  There is no nausea or vomiting.  There is no other aggravating or alleviating factors.    Objective:   No pertinent past medical history.            No pertinent past surgical history.              No pertinent social history.            Review of Systems    Positive for stated Chief Complaint: Toe Pain    Other systems are as noted in HPI.  Constitutional and vital signs reviewed.      All other systems reviewed and negative except as noted above.    Physical Exam     ED Triage Vitals   BP 08/18/24 0922 126/67   Pulse 08/18/24 0922 82   Resp 08/18/24 0922 18   Temp 08/18/24 0922 97.4 °F (36.3 °C)   Temp src 08/18/24 0922 Temporal   SpO2 08/18/24 0922 96 %   O2 Device 08/18/24 1140 None (Room air)       Current Vitals:   Vital Signs  BP: 125/63  Pulse: 83  Resp: 18  Temp: 97.4 °F (36.3 °C)  Temp src: Temporal    Oxygen Therapy  SpO2: 99 %  O2 Device: None (Room air)            Physical Exam  Vitals and nursing note reviewed.   Constitutional:       General: He is not in acute distress.     Appearance: He is well-developed.   HENT:      Head: Normocephalic.      Nose: Nose normal.      Mouth/Throat:      Mouth: Mucous membranes are moist.   Eyes:      Conjunctiva/sclera: Conjunctivae normal.   Cardiovascular:      Rate and Rhythm: Normal rate and regular rhythm.      Heart sounds: No murmur heard.  Pulmonary:      Effort: Pulmonary effort is normal. No respiratory distress.      Breath sounds: Normal breath sounds.   Abdominal:      General: There is no distension.       Palpations: Abdomen is soft.      Tenderness: There is no abdominal tenderness.   Musculoskeletal:      Cervical back: Normal range of motion and neck supple.      Comments: The left lower extremity was examined.  There is pain swelling and tenderness over the first metatarsal phalangeal joint consistent with gout.  There is mild erythema surrounding this area.  Capillary refill is brisk and less than 2 seconds in the tip of the digit.  There is no open wounds.  There are strong pulses in the foot and ankle.   Skin:     General: Skin is warm and dry.      Findings: No rash.   Neurological:      Mental Status: He is alert and oriented to person, place, and time.               ED Course     Labs Reviewed   CBC WITH DIFFERENTIAL WITH PLATELET - Abnormal; Notable for the following components:       Result Value    HGB 11.7 (*)     HCT 36.4 (*)     All other components within normal limits   BASIC METABOLIC PANEL (8) - Abnormal; Notable for the following components:    Glucose 142 (*)     Calculated Osmolality 296 (*)     eGFR-Cr 58 (*)     All other components within normal limits   URIC ACID - Normal                      MDM                        Medical Decision Making  Differential diagnosis considered for cellulitis, gout, fracture, contusion.    Problems Addressed:  Acute gout involving toe of left foot, unspecified cause: acute illness or injury    Amount and/or Complexity of Data Reviewed  Labs: ordered. Decision-making details documented in ED Course.     Details: Normal white blood cell count.  Chemistry panel shows normal kidney function.  Radiology: ordered and independent interpretation performed. Decision-making details documented in ED Course.     Details: X-ray changes consistent with gout and possibly a nondisplaced avulsion fracture  Discussion of management or test interpretation with external provider(s): I discussed with the patient and his daughter the possibilities of what appears to likely be  gout.  Due to his diabetic state and the erythema of the skin he will be placed on an antibiotic as well as prednisone.  He was instructed to closely follow his sugars as prednisone can cause his glucose to become elevated.  He was also given Norco as needed for pain.  I recommend he follow-up with podiatry.    Risk  Prescription drug management.        Disposition and Plan     Clinical Impression:  1. Acute gout involving toe of left foot, unspecified cause         Disposition:  Discharge  8/18/2024 12:07 pm    Follow-up:  Jennifer Mcghee, C[facility]BELL  [address],  [place] [address]  [phone]    Schedule an appointment as soon as possible for a visit      We recommend that you schedule follow up care with a primary care provider within the next three months to obtain basic health screening including reassessment of your blood pressure.      Medications Prescribed:  Current Discharge Medication List        START taking these medications    Details   HYDROcodone-acetaminophen 5-325 MG Oral Tab Take 1 tablet by mouth every 6 (six) hours as needed.  Qty: 10 tablet, Refills: 0    Associated Diagnoses: Acute gout involving toe of left foot, unspecified cause      cephalexin 500 MG Oral Cap Take 1 capsule (500 mg total) by mouth 3 (three) times daily for 7 days.  Qty: 21 capsule, Refills: 0    Associated Diagnoses: Acute gout involving toe of left foot, unspecified cause      predniSONE 20 MG Oral Tab Take 1 tablet (20 mg total) by mouth daily for 5 days.  Qty: 5 tablet, Refills: 0    Associated Diagnoses: Acute gout involving toe of left foot, unspecified cause

## 2024-08-19 ENCOUNTER — TELEPHONE (OUTPATIENT)
Dept: INTERNAL MEDICINE CLINIC | Facility: CLINIC | Age: 89
End: 2024-08-19

## 2024-08-19 ENCOUNTER — PATIENT OUTREACH (OUTPATIENT)
Dept: CASE MANAGEMENT | Age: 89
End: 2024-08-19

## 2024-08-19 DIAGNOSIS — M10.9 ACUTE GOUT INVOLVING TOE OF LEFT FOOT, UNSPECIFIED CAUSE: ICD-10-CM

## 2024-08-19 RX ORDER — PREDNISONE 20 MG/1
20 TABLET ORAL DAILY
Qty: 5 TABLET | Refills: 0 | Status: SHIPPED | OUTPATIENT
Start: 2024-08-19 | End: 2024-08-24

## 2024-08-19 RX ORDER — HYDROCODONE BITARTRATE AND ACETAMINOPHEN 10; 325 MG/1; MG/1
0.5 TABLET ORAL EVERY 6 HOURS PRN
Qty: 5 TABLET | Refills: 0 | Status: SHIPPED | OUTPATIENT
Start: 2024-08-19 | End: 2024-08-24

## 2024-08-19 NOTE — TELEPHONE ENCOUNTER
Dr Gates ---   would you be able to send same Prednisone 20mg, as prescribed by ER? Patient threw away the bottle of Prednisone (because it looked empty. Only 5 pills).   Okay to use as Emergency Room follow-up Visit: Wed 8/21/24  Res24 12:45pm ?    He has not taken any Prednisone since returning home, per daughter.       Please call DAUGHTER, on verbal release. With response.         RN called patient's daughter to clarify if patient has taken any prednisone.   Daughter on verbal release . Patient's date of birth and full name both confirmed.   See her response above, to Provider.   Also asking for appointment with Dr Mirza Gates this week - only opening is Wed 8/21/24  Res24 12:45pm     Future Appointments   Date Time Provider Department Center   10/9/2024 10:30 AM Lashawn Mazariegos MD [facility]   10/17/2024 10:30 AM Mirza Gates MD [facility]

## 2024-08-19 NOTE — TELEPHONE ENCOUNTER
Per daughter of patient, patient was in ER and patient accidentally throw away his Prednisone and need to know if doctor can refill that medication and send to his pharmacy on file verified.    Current Outpatient Medications   Medication Sig Dispense Refill                  predniSONE 20 MG Oral Tab Take 1 tablet (20 mg total) by mouth daily for 5 days. 5 tablet 0

## 2024-08-19 NOTE — TELEPHONE ENCOUNTER
Verified name and  of patient.    Daughter of patient (on release of information) was advised that prescription was sent to pharmacy.    Future Appointments   Date Time Provider Department Center   2024 12:45 PM Mirza Gates MD [facility]   10/9/2024 10:30 AM Lashawn Mazariegos MD [facility]   10/17/2024 10:30 AM Mirza Gates MD [facility]

## 2024-08-20 NOTE — PROGRESS NOTES
Pt had recent ED visit, calling to offer [facility] ED follow-up apt.    Mirza Gates MD  Internal Medicine  [address] [place] [id number] [phone]  Wednesday 8/21 @12:45  Existing appointment.    Closing encounter.

## 2024-08-21 ENCOUNTER — OFFICE VISIT (OUTPATIENT)
Dept: INTERNAL MEDICINE CLINIC | Facility: CLINIC | Age: 89
End: 2024-08-21

## 2024-08-21 ENCOUNTER — TELEPHONE (OUTPATIENT)
Dept: INTERNAL MEDICINE CLINIC | Facility: CLINIC | Age: 89
End: 2024-08-21

## 2024-08-21 VITALS
DIASTOLIC BLOOD PRESSURE: 54 MMHG | HEART RATE: 87 BPM | OXYGEN SATURATION: 97 % | WEIGHT: 194 LBS | BODY MASS INDEX: 28.73 KG/M2 | SYSTOLIC BLOOD PRESSURE: 120 MMHG | HEIGHT: 69 IN

## 2024-08-21 DIAGNOSIS — M10.9 ACUTE GOUTY ARTHRITIS: ICD-10-CM

## 2024-08-21 DIAGNOSIS — I10 ESSENTIAL HYPERTENSION WITH GOAL BLOOD PRESSURE LESS THAN 130/85: ICD-10-CM

## 2024-08-21 DIAGNOSIS — E11.3293 TYPE 2 DIABETES MELLITUS WITH BOTH EYES AFFECTED BY MILD NONPROLIFERATIVE RETINOPATHY WITHOUT MACULAR EDEMA, WITHOUT LONG-TERM CURRENT USE OF INSULIN (HCC): Primary | ICD-10-CM

## 2024-08-21 PROCEDURE — G2211 COMPLEX E/M VISIT ADD ON: HCPCS | Performed by: INTERNAL MEDICINE

## 2024-08-21 PROCEDURE — 99214 OFFICE O/P EST MOD 30 MIN: CPT | Performed by: INTERNAL MEDICINE

## 2024-08-21 RX ORDER — PREDNISONE 20 MG/1
20 TABLET ORAL DAILY
Qty: 3 TABLET | Refills: 0 | Status: SHIPPED | OUTPATIENT
Start: 2024-08-21 | End: 2024-08-24

## 2024-08-21 NOTE — PROGRESS NOTES
Morteza Tubbs is a 89 year old male.  Chief Complaint   Patient presents with    ER F/U     Was in ER on 8/18 for Acute gout involving toe of left foot, unspecified cause     HPI:   89-year-old gentleman here for follow-up and for concern of acute gouty arthritis.  Symptoms started 4 days back.  Getting better with prednisone.  Reportedly sugar numbers are okay.  He is able to weight-bear.      Current Outpatient Medications   Medication Sig Dispense Refill    predniSONE 20 MG Oral Tab Take 1 tablet (20 mg total) by mouth daily for 3 days. 3 tablet 0    predniSONE 20 MG Oral Tab Take 1 tablet (20 mg total) by mouth daily for 5 days. 5 tablet 0    HYDROcodone-acetaminophen  MG Oral Tab Take 0.5 tablets by mouth every 6 (six) hours as needed for Pain. 5 tablet 0    cephalexin 500 MG Oral Cap Take 1 capsule (500 mg total) by mouth 3 (three) times daily for 7 days. 21 capsule 0    cyclobenzaprine 10 MG Oral Tab Take 1 tablet (10 mg total) by mouth every 12 (twelve) hours as needed for Muscle spasms. 180 tablet 1    Accu-Chek Softclix Lancets Does not apply Misc Test 3x daily 300 each 1    Glucose Blood (ACCU-CHEK GUIDE) In Vitro Strip Use to test blood sugar readings once a day. Dx Code:E11.3293 100 strip 1    metFORMIN  MG Oral Tablet 24 Hr Take 1 tablet (500 mg total) by mouth 2 (two) times daily with meals. 180 tablet 1    metFORMIN  MG Oral Tablet 24 Hr Take 1 tablet (500 mg total) by mouth 2 (two) times daily with meals. 180 tablet 1    glipiZIDE 5 MG Oral Tab Take 1 tablet (5 mg total) by mouth 2 (two) times daily before meals. 180 tablet 1    levothyroxine 75 MCG Oral Tab Take 1 tablet (75 mcg total) by mouth before breakfast. 90 tablet 1    lisinopril 10 MG Oral Tab Take 1 tablet (10 mg total) by mouth daily. 90 tablet 3    simvastatin 20 MG Oral Tab Take 1 tablet (20 mg total) by mouth nightly. 90 tablet 3    Blood Glucose Monitoring Suppl (ACCU-CHEK GUIDE) w/Device Does not apply Kit Test  3x daily 1 kit 0    gabapentin 300 MG Oral Cap Take 1 capsule (300 mg total) by mouth 3 (three) times daily. (Patient not taking: Reported on 2024) 90 capsule 2      Past Medical History:    Acquired hypothyroidism    Acute right-sided low back pain with right-sided sciatica    Acute URI    Diabetes (HCC)    Greater trochanteric bursitis of both hips    Lumbar radiculopathy, acute    Lumbar trigger point syndrome    Obesity, unspecified    Osteoarthrosis, unspecified whether generalized or localized, unspecified site    Physical exam, annual    Right shoulder pain    Special screening for malignant neoplasm of prostate    Strain of gluteus medius    Subclinical hyperthyroidism    Subclinical hyperthyroidism    Tubular adenoma of colon    per ng polypectomy    Unspecified essential hypertension      Past Surgical History:   Procedure Laterality Date    Electrocardiogram, complete  10/29/13    scanned into media tab    Other surgical history Right       Social History:  Social History     Socioeconomic History    Marital status:    Tobacco Use    Smoking status: Former     Current packs/day: 0.00     Average packs/day: 0.3 packs/day for 50.0 years (12.5 ttl pk-yrs)     Types: Cigarettes     Start date: 1969     Quit date: 2019     Years since quittin.7    Smokeless tobacco: Never   Vaping Use    Vaping status: Never Used   Substance and Sexual Activity    Alcohol use: Yes     Alcohol/week: 2.5 standard drinks of alcohol     Types: 3 Glasses of wine per week     Comment: socially    Drug use: No   Other Topics Concern    Caffeine Concern Yes     Comment: 1cup coffee    Exercise No   Social History Narrative    The patient does not use an assistive device..      The patient does live in a home with stairs.      Family History   Problem Relation Age of Onset    Heart Disorder Father     Heart Disorder Mother     Diabetes Brother     Lipids Other     Heart Disease Other       No Known  Allergies     REVIEW OF SYSTEMS:   Review of Systems   Review of Systems   Constitutional: Negative for activity change, appetite change and fever.   HENT: Negative for congestion and voice change.    Respiratory: Negative for cough and shortness of breath.    Cardiovascular: Negative for chest pain.   Gastrointestinal: Negative for abdominal distention, abdominal pain and vomiting.   Genitourinary: Negative for hematuria.   Skin: Foot swelling and redness present  Psychiatric/Behavioral: Negative for behavioral problems.   Wt Readings from Last 5 Encounters:   08/21/24 194 lb (88 kg)   08/18/24 192 lb (87.1 kg)   04/16/24 198 lb 12.8 oz (90.2 kg)   12/12/23 187 lb (84.8 kg)   08/29/23 190 lb (86.2 kg)     Body mass index is 28.65 kg/m².      EXAM:   /54   Pulse 87   Ht 5' 9\" (1.753 m)   Wt 194 lb (88 kg)   SpO2 97%   BMI 28.65 kg/m²   Physical Exam   Constitutional:       Appearance: Normal appearance.    Cardiovascular:      Rate and Rhythm: Normal rate and regular rhythm.      Heart sounds: Normal heart sounds. No murmur heard.  Pulmonary:      Effort: Pulmonary effort is normal.      Breath sounds: Normal breath sounds. No rhonchi or rales.   Abdominal:      General: Bowel sounds are normal.      Palpations: Abdomen is soft.      Tenderness: There is no abdominal tenderness.   Musculoskeletal:      Cervical back: Neck supple.      Right lower leg: No edema.      Left lower leg: No edema.   Skin:     General: Skin is warm and dry.   Neurological:      General: No focal deficit present.      Mental Status: He is alert and oriented to person, place, and time. Mental status is at baseline.   Psychiatric:         Mood and Affect: Mood normal.         Behavior: Behavior normal.   Foot redness and warmth present    ASSESSMENT AND PLAN:   1. Type 2 diabetes mellitus with both eyes affected by mild nonproliferative retinopathy without macular edema, without long-term current use of insulin (HCC)  Instructed  patient to monitor blood sugars while on prednisone.  Overall it is controlled.  - CBC, Platelet; No Differential; Future  - Comp Metabolic Panel (14); Future  - Hemoglobin A1C; Future  - Lipid Panel; Future  - TSH W Reflex To Free T4; Future    2. Essential hypertension with goal blood pressure less than 130/85  Instructed patient to monitor blood pressure while on steroids.  - CBC, Platelet; No Differential; Future  - Comp Metabolic Panel (14); Future  - Hemoglobin A1C; Future  - Lipid Panel; Future  - TSH W Reflex To Free T4; Future    3. Acute gouty arthritis  We will give another 3 more days of prednisone.  Send another 3 more days to the pharmacy.  We will check uric acid in few weeks.  Will start allopurinol once flareup is resolved.  - Uric Acid; Future    Plan: Medication prescribed, labs ordered, discussed with daughter through phone.  I will see him back in few months.  Meanwhile, if there is any concerns he will contact me.      The patient indicates understanding of these issues and agrees to the plan.  No follow-ups on file.    This note was prepared using Dragon Medical voice recognition dictation software. As a result errors may occur. When identified these errors have been corrected. While every attempt is made to correct errors during dictation discrepancies may still exist.

## 2024-08-21 NOTE — TELEPHONE ENCOUNTER
Per daughter Saniya (not on verbal release) - aware I can not share information but able to take information.     Patient seen in ER 8/18/24. Has ER follow up this afternoon. Daughter just would like to request Allopurinol be prescribed if something is needed for GOUT since this is what has worked for other family members.     Also asking that a ZBIGNIEW be sent home with patient. Please provide at visit (sending to clinical pool). FYI patient needs Belgian

## 2024-08-29 ENCOUNTER — TELEPHONE (OUTPATIENT)
Dept: INTERNAL MEDICINE CLINIC | Facility: CLINIC | Age: 89
End: 2024-08-29

## 2024-08-29 DIAGNOSIS — M10.9 GOUTY ARTHRITIS: Primary | ICD-10-CM

## 2024-08-29 RX ORDER — PREDNISONE 20 MG/1
TABLET ORAL
Qty: 10 TABLET | Refills: 0 | Status: SHIPPED | OUTPATIENT
Start: 2024-08-29

## 2024-08-29 NOTE — TELEPHONE ENCOUNTER
Patient daughter Iraida calling ( name and date of birth of patient verified ) states the patient ws sen on 8/21 for gout,  has completed the steroids bu now patient's left foot is swollen and tender to touch , top foot  , 1, 2 and 3rd toes       Daughter states at LOV was discussed to use Allopurinol     Last office visit 8/21/2024:    3. Acute gouty arthritis  We will give another 3 more days of prednisone.  Send another 3 more days to the pharmacy.  We will check uric acid in few weeks.  Will start allopurinol once flareup is resolved.  - Uric Acid; Future         Allergies reviewed and pharmacy confirmed    Please advise and thank you.        Best call back number: Iraida  at [phone] with providers response

## 2024-08-29 NOTE — TELEPHONE ENCOUNTER
I do not think it is a good idea to start allopurinol during a flareup.  I have sent another course of prednisone to the pharmacy.  Please arrange for a rheumatology visit.  I have placed the referral.  Thank you

## 2024-08-30 NOTE — TELEPHONE ENCOUNTER
Advised Daughter Iraida(on HIPAA) of Dr. Gates's note and number provided. Daughter verbalized understanding.      _______________________________________________________  Referred to Provider Information:  Provider Address Phone   Daniel Aldrich DO [address] [place] [address] [id number] [phone]

## 2024-08-30 NOTE — TELEPHONE ENCOUNTER
Verified name and  of patient.    Son of patient, Dilshad on release of information, calling to ask why the patient needs to see the rheumatologist for gouty arthritis- states that he and other family members have gouty arthritis and that it is managed by their primary care provider.    Son of patient is asking that message be sent to Dr. Gates to request that patient follow up with Dr. Gates regarding the gout instead of having to go to rheumatologist.

## 2024-09-02 RX ORDER — ALLOPURINOL 100 MG/1
100 TABLET ORAL DAILY
Qty: 90 TABLET | Refills: 0 | Status: SHIPPED | OUTPATIENT
Start: 2024-09-02

## 2024-09-02 NOTE — TELEPHONE ENCOUNTER
Please give patient a follow-up call.  If his gout flareup has resolved, then he can start taking allopurinol daily.  I have sent it to the pharmacy.  If he continues to have recurrent flareups, then again I do recommend him seeing rheumatology.  Also, please provide patient with gout diet.  Thank you

## 2024-09-10 ENCOUNTER — LAB ENCOUNTER (OUTPATIENT)
Dept: LAB | Age: 89
End: 2024-09-10
Attending: INTERNAL MEDICINE
Payer: MEDICARE

## 2024-09-10 DIAGNOSIS — E11.3293 TYPE 2 DIABETES MELLITUS WITH BOTH EYES AFFECTED BY MILD NONPROLIFERATIVE RETINOPATHY WITHOUT MACULAR EDEMA, WITHOUT LONG-TERM CURRENT USE OF INSULIN (HCC): ICD-10-CM

## 2024-09-10 DIAGNOSIS — I10 ESSENTIAL HYPERTENSION WITH GOAL BLOOD PRESSURE LESS THAN 130/85: ICD-10-CM

## 2024-09-10 DIAGNOSIS — M10.9 ACUTE GOUTY ARTHRITIS: ICD-10-CM

## 2024-09-10 LAB
ALBUMIN SERPL-MCNC: 4.1 G/DL (ref 3.2–4.8)
ALBUMIN/GLOB SERPL: 1.9 {RATIO} (ref 1–2)
ALP LIVER SERPL-CCNC: 74 U/L
ALT SERPL-CCNC: 14 U/L
ANION GAP SERPL CALC-SCNC: 2 MMOL/L (ref 0–18)
AST SERPL-CCNC: 12 U/L (ref ?–34)
BILIRUB SERPL-MCNC: 0.6 MG/DL (ref 0.2–1.1)
BUN BLD-MCNC: 20 MG/DL (ref 9–23)
BUN/CREAT SERPL: 16.4 (ref 10–20)
CALCIUM BLD-MCNC: 9.5 MG/DL (ref 8.7–10.4)
CHLORIDE SERPL-SCNC: 108 MMOL/L (ref 98–112)
CHOLEST SERPL-MCNC: 152 MG/DL (ref ?–200)
CO2 SERPL-SCNC: 26 MMOL/L (ref 21–32)
CREAT BLD-MCNC: 1.22 MG/DL
DEPRECATED RDW RBC AUTO: 42.7 FL (ref 35.1–46.3)
EGFRCR SERPLBLD CKD-EPI 2021: 57 ML/MIN/1.73M2 (ref 60–?)
ERYTHROCYTE [DISTWIDTH] IN BLOOD BY AUTOMATED COUNT: 13.5 % (ref 11–15)
EST. AVERAGE GLUCOSE BLD GHB EST-MCNC: 183 MG/DL (ref 68–126)
FASTING PATIENT LIPID ANSWER: YES
FASTING STATUS PATIENT QL REPORTED: YES
GLOBULIN PLAS-MCNC: 2.2 G/DL (ref 2–3.5)
GLUCOSE BLD-MCNC: 158 MG/DL (ref 70–99)
HBA1C MFR BLD: 8 % (ref ?–5.7)
HCT VFR BLD AUTO: 38.2 %
HDLC SERPL-MCNC: 40 MG/DL (ref 40–59)
HGB BLD-MCNC: 12.2 G/DL
LDLC SERPL CALC-MCNC: 84 MG/DL (ref ?–100)
MCH RBC QN AUTO: 27.5 PG (ref 26–34)
MCHC RBC AUTO-ENTMCNC: 31.9 G/DL (ref 31–37)
MCV RBC AUTO: 86.2 FL
NONHDLC SERPL-MCNC: 112 MG/DL (ref ?–130)
OSMOLALITY SERPL CALC.SUM OF ELEC: 288 MOSM/KG (ref 275–295)
PLATELET # BLD AUTO: 170 10(3)UL (ref 150–450)
POTASSIUM SERPL-SCNC: 4.7 MMOL/L (ref 3.5–5.1)
PROT SERPL-MCNC: 6.3 G/DL (ref 5.7–8.2)
RBC # BLD AUTO: 4.43 X10(6)UL
SODIUM SERPL-SCNC: 136 MMOL/L (ref 136–145)
TRIGL SERPL-MCNC: 162 MG/DL (ref 30–149)
TSI SER-ACNC: 2.78 MIU/ML (ref 0.55–4.78)
URATE SERPL-MCNC: 6.1 MG/DL
VLDLC SERPL CALC-MCNC: 26 MG/DL (ref 0–30)
WBC # BLD AUTO: 5.8 X10(3) UL (ref 4–11)

## 2024-09-10 PROCEDURE — 84443 ASSAY THYROID STIM HORMONE: CPT

## 2024-09-10 PROCEDURE — 83036 HEMOGLOBIN GLYCOSYLATED A1C: CPT

## 2024-09-10 PROCEDURE — 84550 ASSAY OF BLOOD/URIC ACID: CPT

## 2024-09-10 PROCEDURE — 80061 LIPID PANEL: CPT

## 2024-09-10 PROCEDURE — 36415 COLL VENOUS BLD VENIPUNCTURE: CPT

## 2024-09-10 PROCEDURE — 85027 COMPLETE CBC AUTOMATED: CPT

## 2024-09-10 PROCEDURE — 80053 COMPREHEN METABOLIC PANEL: CPT

## 2024-09-20 ENCOUNTER — TELEPHONE (OUTPATIENT)
Dept: INTERNAL MEDICINE CLINIC | Facility: CLINIC | Age: 89
End: 2024-09-20

## 2024-09-20 NOTE — TELEPHONE ENCOUNTER
Response to cancel request received from pharmacy.  Received: Today  [facility]  [facility] Refills  The pharmacy received the electronic cancel request, but could not cancel the prescription. Additional follow up tasks may be necessary based on the pharmacy response noted below.    Pharmacy Note: Prescription not found. Contact Pharmacy by other means          Pharmacy    [facility] #[id number] - [place], [place] - [address] across from [address], [phone], [phone]   [address] [address]   Phone: [phone] Fax: [phone]   Hours: Not open 24 hours     Outpatient Medication Detail     Disp Refills Start End    Allopurinol 200 MG Oral Tab (Discontinued) 90 tablet 1 9/18/2024 9/20/2024    Sig - Route: Take 1 tablet by mouth daily. - Oral    Sent to pharmacy as: Allopurinol 200 MG Oral Tablet    Reason for Discontinue:  Dosage Changed, Please See New Prescription    E-Prescribing Status: Receipt confirmed by pharmacy (9/18/2024  6:24 PM CDT)    E-Cancel Status: Request denied by pharmacy (9/20/2024 12:34 PM CDT)       E-Cancel Status Note: Prescription not found. Contact Pharmacy by other means      Order Providers    Prescribing Provider Encounter Provider   Mirza Gates MD Abraham, Joseph, MD         Encounter    View Encounter              This Order Has Been Discontinued    Order Status Reason By On   Discontinued  Dosage Changed, Please See New Prescription Mirza Gates MD 9/20/24 2721

## 2024-09-26 ENCOUNTER — TELEPHONE (OUTPATIENT)
Dept: INTERNAL MEDICINE CLINIC | Facility: CLINIC | Age: 89
End: 2024-09-26

## 2024-09-26 NOTE — TELEPHONE ENCOUNTER
Patient's son calling and advised patient is to see a specialist however the wait is long so his sister called the office of the specialist and asked to be put on a wait list but she was informed in order to do so he would need to get the my chart jaime. I advised his son that is they way the office would notify if an appointment became available. I explained the process of becoming proxy on his dad's my chart and provided the phone number to the help desk.

## 2024-10-09 ENCOUNTER — OFFICE VISIT (OUTPATIENT)
Dept: ENDOCRINOLOGY CLINIC | Facility: CLINIC | Age: 89
End: 2024-10-09

## 2024-10-09 VITALS
HEART RATE: 78 BPM | HEIGHT: 69 IN | WEIGHT: 191.63 LBS | SYSTOLIC BLOOD PRESSURE: 105 MMHG | BODY MASS INDEX: 28.38 KG/M2 | DIASTOLIC BLOOD PRESSURE: 55 MMHG

## 2024-10-09 DIAGNOSIS — E78.5 DYSLIPIDEMIA: ICD-10-CM

## 2024-10-09 DIAGNOSIS — I10 ESSENTIAL HYPERTENSION WITH GOAL BLOOD PRESSURE LESS THAN 130/85: ICD-10-CM

## 2024-10-09 DIAGNOSIS — E11.3293 TYPE 2 DIABETES MELLITUS WITH BOTH EYES AFFECTED BY MILD NONPROLIFERATIVE RETINOPATHY WITHOUT MACULAR EDEMA, WITHOUT LONG-TERM CURRENT USE OF INSULIN (HCC): Primary | ICD-10-CM

## 2024-10-09 DIAGNOSIS — E03.9 HYPOTHYROIDISM, UNSPECIFIED TYPE: ICD-10-CM

## 2024-10-09 DIAGNOSIS — E55.9 VITAMIN D DEFICIENCY: ICD-10-CM

## 2024-10-09 LAB
CARTRIDGE EXPIRATION DATE: ABNORMAL DATE
GLUCOSE BLOOD: 237
HEMOGLOBIN A1C: 7.4 % (ref 4.3–5.6)
TEST STRIP EXPIRATION DATE: NORMAL DATE
TEST STRIP LOT #: NORMAL NUMERIC

## 2024-10-09 PROCEDURE — 82947 ASSAY GLUCOSE BLOOD QUANT: CPT | Performed by: INTERNAL MEDICINE

## 2024-10-09 PROCEDURE — 83036 HEMOGLOBIN GLYCOSYLATED A1C: CPT | Performed by: INTERNAL MEDICINE

## 2024-10-09 PROCEDURE — 99214 OFFICE O/P EST MOD 30 MIN: CPT | Performed by: INTERNAL MEDICINE

## 2024-10-09 NOTE — PROGRESS NOTES
Name: Morteza Tubbs  Date: 10/09/24    Referring Physician: No ref. provider found    Patient here to follow up on Type 2 diabetes as well as Hypothyroidism    HISTORY OF PRESENT ILLNESS   Morteza Tubbs is a 89 year old male who presents for hypothyroidism for follow up.    At the first initial visit, he came to see me for a suppressed TSH, that he had a couple of months before seeing me. He was largely asymptomatic, and we basically just repeated and followed thyroid function tests.     After a few visits, we found that his TSH had increased and FT4 had decreased. He was started on levothyroxine 25mcg and asked to follow up in 3-4 months. We then increased his levothyroxine to 50mcg and he has been doing well on this. At the last visit, we continued him on this and his most recent TSH is within normal limits.     He also has type 2 diabetes and had been on metformin ER 1g PO bid and glimepiride 2mg PO bid for many years and recently, his HbA1c increased by 1 point. At the last visit, I had changed his glimepiride for glipizide 5mg PO bid. He eats healthy and exercises.     4/09/24:  He is now taking metformin ER 500mg PO bid and glipizide 5mg PO bid.   Blood Sugar- 151; HbA1c- 7.1    His most recent TSH is elevated. I have confirmed that he is taking the levothyroxine in the correct manner.     TODAY: BS- 237; HbA1c- 7.4  Medications - Metformin - morning and evening    Medical History:   Past Medical History:    Acquired hypothyroidism    Acute right-sided low back pain with right-sided sciatica    Acute URI    Diabetes (HCC)    Greater trochanteric bursitis of both hips    Lumbar radiculopathy, acute    Lumbar trigger point syndrome    Obesity, unspecified    Osteoarthrosis, unspecified whether generalized or localized, unspecified site    Physical exam, annual    Right shoulder pain    Special screening for malignant neoplasm of prostate    Strain of gluteus medius    Subclinical hyperthyroidism     Subclinical hyperthyroidism    Tubular adenoma of colon    per ng polypectomy    Unspecified essential hypertension       Surgical History:   Past Surgical History:   Procedure Laterality Date    Electrocardiogram, complete  10/29/13    scanned into media tab    Other surgical history Right        Family History:  Family History   Problem Relation Age of Onset    Heart Disorder Father     Heart Disorder Mother     Diabetes Brother     Lipids Other     Heart Disease Other        Social History:   Social History     Socioeconomic History    Marital status:    Tobacco Use    Smoking status: Former     Current packs/day: 0.00     Average packs/day: 0.3 packs/day for 50.0 years (12.5 ttl pk-yrs)     Types: Cigarettes     Start date: 1969     Quit date: 2019     Years since quittin.8    Smokeless tobacco: Never   Vaping Use    Vaping status: Never Used   Substance and Sexual Activity    Alcohol use: Yes     Alcohol/week: 2.5 standard drinks of alcohol     Types: 3 Glasses of wine per week     Comment: socially    Drug use: No   Other Topics Concern    Caffeine Concern Yes     Comment: 1cup coffee    Exercise No   Social History Narrative    The patient does not use an assistive device..      The patient does live in a home with stairs.       Medications:     Current Outpatient Medications:     metFORMIN  MG Oral Tablet 24 Hr, Take 1 tablet (500 mg total) by mouth 2 (two) times daily with meals., Disp: 180 tablet, Rfl: 1    glipiZIDE 5 MG Oral Tab, Take 1 tablet (5 mg total) by mouth 2 (two) times daily before meals., Disp: 180 tablet, Rfl: 1    levothyroxine 75 MCG Oral Tab, Take 1 tablet (75 mcg total) by mouth before breakfast., Disp: 90 tablet, Rfl: 1    allopurinol 100 MG Oral Tab, Take 2 tablets (200 mg total) by mouth daily., Disp: 180 tablet, Rfl: 1    predniSONE 20 MG Oral Tab, Take 2 tablets for 3 days and then 1 tablet for 4 days, Disp: 10 tablet, Rfl: 0    cyclobenzaprine 10 MG  Oral Tab, Take 1 tablet (10 mg total) by mouth every 12 (twelve) hours as needed for Muscle spasms., Disp: 180 tablet, Rfl: 1    Accu-Chek Softclix Lancets Does not apply Misc, Test 3x daily, Disp: 300 each, Rfl: 1    Glucose Blood (ACCU-CHEK GUIDE) In Vitro Strip, Use to test blood sugar readings once a day. Dx Code:E11.3293, Disp: 100 strip, Rfl: 1    metFORMIN  MG Oral Tablet 24 Hr, Take 1 tablet (500 mg total) by mouth 2 (two) times daily with meals., Disp: 180 tablet, Rfl: 1    lisinopril 10 MG Oral Tab, Take 1 tablet (10 mg total) by mouth daily., Disp: 90 tablet, Rfl: 3    simvastatin 20 MG Oral Tab, Take 1 tablet (20 mg total) by mouth nightly., Disp: 90 tablet, Rfl: 3    Blood Glucose Monitoring Suppl (ACCU-CHEK GUIDE) w/Device Does not apply Kit, Test 3x daily, Disp: 1 kit, Rfl: 0    gabapentin 300 MG Oral Cap, Take 1 capsule (300 mg total) by mouth 3 (three) times daily. (Patient not taking: Reported on 4/16/2024), Disp: 90 capsule, Rfl: 2     Allergies:   No Known Allergies    REVIEW OF SYSTEMS  Eyes: no change in vision  Neurologic: no headache, generalized or focal weakness or numbness.  Head: normal  ENT: normal  Lungs: no shortness of breath, wheezing or KIM  Cardiovascular:  no chest pain or palpitations  Gastrointestinal:  no abdominal pain, bowel movement problems  Musculoskeletal: no muscle pain or arthralgia  /Gyne: no frequency or discomfort while urinating  Psychiatric:  no acute distress, anxiety  or depression  Skin: normal moisturized skin    PHYSICAL EXAMINATION:  Vitals:    10/09/24 1034   BP: 105/55   Pulse: 78   Weight: 191 lb 9.6 oz (86.9 kg)   Height: 5' 9\" (1.753 m)       General Appearance:  Alert, in no acute distress, well developed  Eyes: normal conjunctivae, sclera.  Neuro:  sensory grossly intact and motor grossly intact  Psychiatric:  oriented to time, self, and place  Nutritional:  no abnormal weight gain or loss  Bilateral barefoot skin diabetic exam is normal,  visualized feet and the appearance is normal.  Bilateral monofilament/sensation of both feet is normal.  Pulsation pedal pulse exam of both lower legs/feet is normal as well.    Labs:  Date  TSH  FT4 FT3 LT4  03/20/20 6.330  0.8  07/06/20 7.610  0.9   03/10/21 <0.005  1.6 3.98  05/12/21 2.50  0.8 2.23    08/20/21 5.90  0.9  03/18/22 6.530  0.8  09/23/22 9.670  0.7  01/27/23 9.780  0.8  25  05/16/23 5.180  0.9  50  08/02/23 1.910  1.0  50  02/23/24 7.360  1.0  50  03/29/24 8.113  1.1  50   09/10/24 2.778    75          Imaging:  Reviewed      ASSESSMENT/PLAN: -This is a 89 year-old male patient with hypothyroidism and Type 2 diabetes.    - We discussed the diagnosis of hypothyroidism.  - We discussed the nonspecific nature of the symptoms of thyroid disease.  - We discussed that occasionally we require optimization of thyroid levels to TSH 1.0  - We also discussed that if symptoms do not improve on optimized levels then we can always consider combination treatment with T4 and T3  - I reminded the patient about the proper way in which to take the medication (an hour before any medications or food and that they may take two the next day if they forget to take a dose today) and they acknowledged understanding.    - I would like him to continue on Metformin ER 500mg PO bid and Glipizide 5mg PO bid and simvastatin  - I would like him to continue the levothyroxine 75mcg    Return to clinic in 6 months with labs      Prior to this encounter, I spent over 15 minutes with preparing for the visit, including reviewing documents from other specialties as well as from PCP and going over test results. During the face to face encounter, I spent an additional 15 minutes which were determined for follow-up. Greater than 50% of the time was spent in counseling, anticipatory guidance, and coordination of care. Patient concerns were answered to the best of my knowledge.         10/09/24  Lashawn Mazariegos MD

## 2024-10-10 RX ORDER — LEVOTHYROXINE SODIUM 75 UG/1
75 TABLET ORAL
Qty: 90 TABLET | Refills: 3 | Status: SHIPPED | OUTPATIENT
Start: 2024-10-10

## 2024-10-10 NOTE — TELEPHONE ENCOUNTER
Refill Per Protocol     Requested Prescriptions   Pending Prescriptions Disp Refills    LEVOTHYROXINE 75 MCG Oral Tab [Pharmacy Med Name: LEVOTHYROXINE 75 MCG TABLET] 90 tablet 1     Sig: TAKE 1 TABLET BY MOUTH BEFORE BREAKFAST.       Thyroid Medication Protocol Passed - 10/10/2024  2:48 PM        Passed - TSH in past 12 months        Passed - Last TSH value is normal     Lab Results   Component Value Date    TSH 2.778 09/10/2024                 Passed - In person appointment or virtual visit in the past 12 mos or appointment in next 3 mos     Recent Outpatient Visits              Yesterday Type 2 diabetes mellitus with both eyes affected by mild nonproliferative retinopathy without macular edema, without long-term current use of insulin ([facility])    [facility] Lashawn Mazariegos MD    Office Visit    1 month ago Type 2 diabetes mellitus with both eyes affected by mild nonproliferative retinopathy without macular edema, without long-term current use of insulin ([facility])    [facility] Mirza Gates MD    Office Visit    5 months ago Essential hypertension with goal blood pressure less than 130/85    [facility] Mirza Gates MD    Office Visit    6 months ago Type 2 diabetes mellitus with both eyes affected by mild nonproliferative retinopathy without macular edema, without long-term current use of insulin ([facility])    [facility] Lashawn Mazariegos MD    Office Visit    10 months ago Essential hypertension with goal blood pressure less than 130/85    [facility] Mirza Gates MD    Office Visit          Future Appointments         Provider Department Appt Notes    In 1 week Mirza Gates MD [facility]     In 1 month Daniel Aldrich DO [facility] M10.9 (ICD-10-CM) - 274.00 (ICD-9-CM) - Gouty arthritis    In 6 months Lashawn Mazariegos MD [facility] 6 months                           Future Appointments         Provider Department Appt Notes    In 1 week Mirza Gates MD [facility]     In 1 month Daniel Aldrich DO [facility] M10.9 (ICD-10-CM) - 274.00 (ICD-9-CM) - Gouty arthritis    In 6 months Lashawn Mazariegos MD [facility] 6 months          Recent Outpatient Visits              Yesterday Type 2 diabetes mellitus with both eyes affected by mild nonproliferative retinopathy without macular edema, without long-term current use of insulin ([facility])    [facility] Lashawn Mazariegos MD    Office Visit    1 month ago Type 2 diabetes mellitus with both eyes affected by mild nonproliferative retinopathy without macular edema, without long-term current use of insulin ([facility])    [facility]    Office Visit    5 months ago Essential hypertension with goal blood pressure less than 130/85    [facility]    Office Visit    6 months ago Type 2 diabetes mellitus with both eyes affected by mild nonproliferative retinopathy without macular edema, without long-term current use of insulin ([facility])    [facility] Lashawn Mazariegos MD    Office Visit    10 months ago Essential hypertension with goal blood pressure less than 130/85    [facility]    Office Visit

## 2024-10-16 RX ORDER — CYCLOBENZAPRINE HCL 10 MG
10 TABLET ORAL EVERY 12 HOURS PRN
Qty: 180 TABLET | Refills: 1 | Status: SHIPPED | OUTPATIENT
Start: 2024-10-16 | End: 2025-04-14

## 2024-10-16 NOTE — TELEPHONE ENCOUNTER
Please review. Protocol Failed; No Protocol    Requested Prescriptions   Pending Prescriptions Disp Refills    CYCLOBENZAPRINE 10 MG Oral Tab [Pharmacy Med Name: CYCLOBENZAPRINE 10 MG TABLET] 180 tablet 1     Sig: Take 1 tablet (10 mg total) by mouth every 12 (twelve) hours as needed for Muscle spasms.       There is no refill protocol information for this order            Future Appointments         Provider Department Appt Notes    Tomorrow Mirza Gates MD [facility]     In 1 month Daniel Aldrich DO [facility] M10.9 (ICD-10-CM) - 274.00 (ICD-9-CM) - Gouty arthritis    In 5 months Lashawn Mazariegos MD [facility] 6 months          Recent Outpatient Visits              1 week ago Type 2 diabetes mellitus with both eyes affected by mild nonproliferative retinopathy without macular edema, without long-term current use of insulin ([facility])    [facility] Lashawn Mazariegos MD    Office Visit    1 month ago Type 2 diabetes mellitus with both eyes affected by mild nonproliferative retinopathy without macular edema, without long-term current use of insulin ([facility])    [facility] Mirza Gates MD    Office Visit    6 months ago Essential hypertension with goal blood pressure less than 130/85    [facility] Mirza Gates MD    Office Visit    6 months ago Type 2 diabetes mellitus with both eyes affected by mild nonproliferative retinopathy without macular edema, without long-term current use of insulin ([facility])    [facility] Lashawn Mazariegos MD    Office Visit    10 months ago Essential hypertension with goal blood pressure less than 130/85    [facility]  Mirza Gates MD    Office Visit

## 2024-10-17 ENCOUNTER — OFFICE VISIT (OUTPATIENT)
Facility: CLINIC | Age: 89
End: 2024-10-17

## 2024-10-17 VITALS
WEIGHT: 192 LBS | OXYGEN SATURATION: 96 % | HEART RATE: 74 BPM | HEIGHT: 69 IN | BODY MASS INDEX: 28.44 KG/M2 | DIASTOLIC BLOOD PRESSURE: 76 MMHG | SYSTOLIC BLOOD PRESSURE: 128 MMHG

## 2024-10-17 DIAGNOSIS — E78.5 DYSLIPIDEMIA: ICD-10-CM

## 2024-10-17 DIAGNOSIS — E03.9 HYPOTHYROIDISM, UNSPECIFIED TYPE: ICD-10-CM

## 2024-10-17 DIAGNOSIS — I10 ESSENTIAL HYPERTENSION WITH GOAL BLOOD PRESSURE LESS THAN 130/85: ICD-10-CM

## 2024-10-17 DIAGNOSIS — E11.3293 TYPE 2 DIABETES MELLITUS WITH BOTH EYES AFFECTED BY MILD NONPROLIFERATIVE RETINOPATHY WITHOUT MACULAR EDEMA, WITHOUT LONG-TERM CURRENT USE OF INSULIN (HCC): Primary | ICD-10-CM

## 2024-10-17 PROCEDURE — 99214 OFFICE O/P EST MOD 30 MIN: CPT | Performed by: INTERNAL MEDICINE

## 2024-10-17 PROCEDURE — G2211 COMPLEX E/M VISIT ADD ON: HCPCS | Performed by: INTERNAL MEDICINE

## 2024-10-18 NOTE — PROGRESS NOTES
Morteza Tubbs is a 89 year old male.  Chief Complaint   Patient presents with    Follow - Up     HPI:   89-year-old gentleman here for follow-up.  Since his last visit, he had 2 flareup of gout.  I have talked to his family in multiple locations.  They wanted him to be on allopurinol.  I have encouraged them to make an appointment to see rheumatology and find they have made an appointment for December 5.  He has no more recurrence of gout.  Denies any chest pain or heaviness.  Denies any hypoglycemic events.      Current Outpatient Medications   Medication Sig Dispense Refill    cyclobenzaprine 10 MG Oral Tab Take 1 tablet (10 mg total) by mouth every 12 (twelve) hours as needed for Muscle spasms. 180 tablet 1    levothyroxine 75 MCG Oral Tab Take 1 tablet (75 mcg total) by mouth before breakfast. 90 tablet 3    allopurinol 100 MG Oral Tab Take 2 tablets (200 mg total) by mouth daily. 180 tablet 1    Accu-Chek Softclix Lancets Does not apply Misc Test 3x daily 300 each 1    Glucose Blood (ACCU-CHEK GUIDE) In Vitro Strip Use to test blood sugar readings once a day. Dx Code:E11.3293 100 strip 1    metFORMIN  MG Oral Tablet 24 Hr Take 1 tablet (500 mg total) by mouth 2 (two) times daily with meals. 180 tablet 1    glipiZIDE 5 MG Oral Tab Take 1 tablet (5 mg total) by mouth 2 (two) times daily before meals. 180 tablet 1    lisinopril 10 MG Oral Tab Take 1 tablet (10 mg total) by mouth daily. 90 tablet 3    simvastatin 20 MG Oral Tab Take 1 tablet (20 mg total) by mouth nightly. 90 tablet 3    Blood Glucose Monitoring Suppl (ACCU-CHEK GUIDE) w/Device Does not apply Kit Test 3x daily 1 kit 0    gabapentin 300 MG Oral Cap Take 1 capsule (300 mg total) by mouth 3 (three) times daily. (Patient not taking: Reported on 10/17/2024) 90 capsule 2      Past Medical History:    Acquired hypothyroidism    Acute right-sided low back pain with right-sided sciatica    Acute URI    Diabetes (HCC)    Greater trochanteric  bursitis of both hips    Lumbar radiculopathy, acute    Lumbar trigger point syndrome    Obesity, unspecified    Osteoarthrosis, unspecified whether generalized or localized, unspecified site    Physical exam, annual    Right shoulder pain    Special screening for malignant neoplasm of prostate    Strain of gluteus medius    Subclinical hyperthyroidism    Subclinical hyperthyroidism    Tubular adenoma of colon    per ng polypectomy    Unspecified essential hypertension      Past Surgical History:   Procedure Laterality Date    Electrocardiogram, complete  10/29/13    scanned into media tab    Other surgical history Right       Social History:  Social History     Socioeconomic History    Marital status:    Tobacco Use    Smoking status: Former     Current packs/day: 0.00     Average packs/day: 0.3 packs/day for 50.0 years (12.5 ttl pk-yrs)     Types: Cigarettes     Start date: 1969     Quit date: 2019     Years since quittin.9    Smokeless tobacco: Never   Vaping Use    Vaping status: Never Used   Substance and Sexual Activity    Alcohol use: Yes     Alcohol/week: 2.5 standard drinks of alcohol     Types: 3 Glasses of wine per week     Comment: socially    Drug use: No   Other Topics Concern    Caffeine Concern Yes     Comment: 1cup coffee    Exercise No   Social History Narrative    The patient does not use an assistive device..      The patient does live in a home with stairs.      Family History   Problem Relation Age of Onset    Heart Disorder Father     Heart Disorder Mother     Diabetes Brother     Lipids Other     Heart Disease Other       Allergies[1]     REVIEW OF SYSTEMS:   Review of Systems   Review of Systems   Constitutional: Negative for activity change, appetite change and fever.   HENT: Negative for congestion and voice change.    Respiratory: Negative for cough and shortness of breath.    Cardiovascular: Negative for chest pain.   Gastrointestinal: Negative for abdominal  distention, abdominal pain and vomiting.   Genitourinary: Negative for hematuria.   Skin: Negative for wound.   Psychiatric/Behavioral: Negative for behavioral problems.   Wt Readings from Last 5 Encounters:   10/17/24 192 lb (87.1 kg)   10/09/24 191 lb 9.6 oz (86.9 kg)   08/21/24 194 lb (88 kg)   08/18/24 192 lb (87.1 kg)   04/16/24 198 lb 12.8 oz (90.2 kg)     Body mass index is 28.35 kg/m².      EXAM:   /76   Pulse 74   Ht 5' 9\" (1.753 m)   Wt 192 lb (87.1 kg)   SpO2 96%   BMI 28.35 kg/m²   Physical Exam   Constitutional:       Appearance: Normal appearance.   HENT:      Head: Normocephalic.   Eyes:      Conjunctiva/sclera: Conjunctivae normal.   Cardiovascular:      Rate and Rhythm: Normal rate and regular rhythm.      Heart sounds: Normal heart sounds. No murmur heard.  Pulmonary:      Effort: Pulmonary effort is normal.      Breath sounds: Normal breath sounds. No rhonchi or rales.   Abdominal:      General: Bowel sounds are normal.      Palpations: Abdomen is soft.      Tenderness: There is no abdominal tenderness.   Musculoskeletal:      Cervical back: Neck supple.      Right lower leg: No edema.      Left lower leg: No edema.   Bilateral barefoot skin diabetic exam is normal, visualized feet and the appearance is normal.  Bilateral monofilament/sensation of both feet is normal.  Pulsation pedal pulse exam of both lower legs/feet is normal as well.  Skin:     General: Skin is warm and dry.   Neurological:      General: No focal deficit present.      Mental Status: He is alert and oriented to person, place, and time. Mental status is at baseline.   Psychiatric:         Mood and Affect: Mood normal.         Behavior: Behavior normal.       ASSESSMENT AND PLAN:   1. Type 2 diabetes mellitus with both eyes affected by mild nonproliferative retinopathy without macular edema, without long-term current use of insulin ([facility])  A1c got better to 7.4.  Continue with the current medical regimen.  Continue  statins.  Up-to-date on eye exam.  Foot exam completed today.  - CBC, Platelet; No Differential; Future  - Comp Metabolic Panel (14); Future  - Hemoglobin A1C; Future  - Lipid Panel; Future  - TSH W Reflex To Free T4; Future  - Microalb/Creat Ratio, Random Urine; Future    2. Essential hypertension with goal blood pressure less than 130/85  Lab Results   Component Value Date    CREATSERUM 1.22 09/10/2024    TSH 2.778 09/10/2024     Will continue to monitor blood pressure.  Encouraged patient to avoid salt.  Continue current medical regimen.      3. Dyslipidemia  Lab Results   Component Value Date    LDL 84 09/10/2024    AST 12 09/10/2024    ALT 14 09/10/2024      Encouraged patient to eat healthy.  Avoid fat fried foods and increase activity as tolerated.  We will monitor lipid profile and liver function test.      4. Hypothyroidism, unspecified type  Continue thyroid supplements.  Monitor TSH    Gouty arthritis-stable.  I have encouraged him to follow-up with rheumatology for an opinion.    Plan: As above.  Labs ordered.  I will see him back in 6 months.  Meanwhile, if there is any concerns he will contact me.      The patient indicates understanding of these issues and agrees to the plan.  No follow-ups on file.    This note was prepared using Dragon [employer] voice recognition dictation software. As a result errors may occur. When identified these errors have been corrected. While every attempt is made to correct errors during dictation discrepancies may still exist.       [1] No Known Allergies

## 2024-10-31 ENCOUNTER — TELEPHONE (OUTPATIENT)
Dept: INTERNAL MEDICINE CLINIC | Facility: CLINIC | Age: 89
End: 2024-10-31

## 2024-10-31 NOTE — TELEPHONE ENCOUNTER
Spoke with patient's son per [facility]BIGNIEW, Date of Birth verified  He stated patient was referred to see Rheuma for gout , patient foot swelling went down.   He is wondering if they need to keep his appt with Rheuma on 12-5-24.  He was advised to soon to cancel an appt, advised to keep appt for now in case patient has a flare up.   He agreed and stated understanding.          Future Appointments   Date Time Provider Department Center   12/5/2024  9:30 AM [facility]   4/9/2025 10:00 AM Lashawn Mazariegos MD [facility]   4/16/2025 11:15 AM Mirza Gates MD [facility]

## 2024-11-01 DIAGNOSIS — E11.3293 TYPE 2 DIABETES MELLITUS WITH BOTH EYES AFFECTED BY MILD NONPROLIFERATIVE RETINOPATHY WITHOUT MACULAR EDEMA, WITHOUT LONG-TERM CURRENT USE OF INSULIN (HCC): ICD-10-CM

## 2024-11-02 DIAGNOSIS — E11.3293 TYPE 2 DIABETES MELLITUS WITH BOTH EYES AFFECTED BY MILD NONPROLIFERATIVE RETINOPATHY WITHOUT MACULAR EDEMA, WITHOUT LONG-TERM CURRENT USE OF INSULIN (HCC): ICD-10-CM

## 2024-11-02 NOTE — TELEPHONE ENCOUNTER
Endocrine Refill protocol for Glucose testing supplies     Protocol Criteria: PASSED Reason: N/A    If below requirement is met, send a 90-day supply with 1 refill per provider protocol.    Verify appointment with Endocrinology completed in the last 6 months or scheduled in the next 3 months.    Last completed office visit: 10/9/2024 Lashawn Mazariegos MD   Next scheduled Follow up:   Future Appointments   Date Time Provider Department Center          4/9/2025 10:00 AM [facility]

## 2024-11-03 RX ORDER — BLOOD SUGAR DIAGNOSTIC
STRIP MISCELLANEOUS
Qty: 100 STRIP | Refills: 1 | Status: SHIPPED | OUTPATIENT
Start: 2024-11-03

## 2024-11-05 RX ORDER — SIMVASTATIN 20 MG
20 TABLET ORAL NIGHTLY
Qty: 90 TABLET | Refills: 3 | Status: SHIPPED | OUTPATIENT
Start: 2024-11-05

## 2024-11-05 NOTE — TELEPHONE ENCOUNTER
Refill passed per [facility] protocols.    Requested Prescriptions   Pending Prescriptions Disp Refills    SIMVASTATIN 20 MG Oral Tab [Pharmacy Med Name: SIMVASTATIN 20 MG TABLET] 90 tablet 3     Sig: TAKE 1 TABLET BY MOUTH EVERY DAY AT NIGHT       Cholesterol Medication Protocol Passed - 11/5/2024 11:23 AM        Passed - ALT < 80     Lab Results   Component Value Date    ALT 14 09/10/2024             Passed - ALT resulted within past year        Passed - Lipid panel within past 12 months     Lab Results   Component Value Date    CHOLEST 152 09/10/2024    TRIG 162 (H) 09/10/2024    HDL 40 09/10/2024    LDL 84 09/10/2024    VLDL 26 09/10/2024    NONHDLC 112 09/10/2024             Passed - In person appointment or virtual visit in the past 12 mos or appointment in next 3 mos     Recent Outpatient Visits              2 weeks ago Type 2 diabetes mellitus with both eyes affected by mild nonproliferative retinopathy without macular edema, without long-term current use of insulin ([facility])    formerly [facility] Mirza Gates MD    Office Visit    3 weeks ago Type 2 diabetes mellitus with both eyes affected by mild nonproliferative retinopathy without macular edema, without long-term current use of insulin ([facility])    formerly [facility] Lashawn Mazariegos MD    Office Visit    2 months ago Type 2 diabetes mellitus with both eyes affected by mild nonproliferative retinopathy without macular edema, without long-term current use of insulin ([facility])    [facility] Mirza Gates MD    Office Visit    6 months ago Essential hypertension with goal blood pressure less than 130/85    [facility] Mirza Gates MD    Office Visit    7 months ago Type 2 diabetes mellitus with both eyes affected by mild nonproliferative retinopathy without macular edema, without  long-term current use of insulin (HCC)    [facility] Lashawn Mazariegos MD    Office Visit          Future Appointments         Provider Department Appt Notes    In 1 month Daniel Aldrich DO [facility] M10.9 (ICD-10-CM) - 274.00 (ICD-9-CM) - Gouty arthritis    In 5 months Lashawn Mazariegos MD [facility] 6 months    In 5 months Mirza Gates MD [facility]

## 2024-11-11 DIAGNOSIS — E11.3293 TYPE 2 DIABETES MELLITUS WITH BOTH EYES AFFECTED BY MILD NONPROLIFERATIVE RETINOPATHY WITHOUT MACULAR EDEMA, WITHOUT LONG-TERM CURRENT USE OF INSULIN (HCC): ICD-10-CM

## 2024-11-14 RX ORDER — GLIPIZIDE 5 MG/1
5 TABLET ORAL
Qty: 180 TABLET | Refills: 3 | Status: SHIPPED | OUTPATIENT
Start: 2024-11-14

## 2024-11-14 NOTE — TELEPHONE ENCOUNTER
Refill passed per [facility] protocols.    Requested Prescriptions   Pending Prescriptions Disp Refills    GLIPIZIDE 5 MG Oral Tab [Pharmacy Med Name: GLIPIZIDE 5 MG TABLET] 180 tablet 3     Sig: TAKE 1 TABLET BY MOUTH 2 TIMES DAILY BEFORE MEALS.       Diabetes Medication Protocol Passed - 11/14/2024 11:21 AM        Passed - Last A1C < 7.5 and within past 6 months     Lab Results   Component Value Date    A1C 7.4 (A) 10/09/2024             Passed - In person appointment or virtual visit in the past 6 mos or appointment in next 3 mos     Recent Outpatient Visits              4 weeks ago Type 2 diabetes mellitus with both eyes affected by mild nonproliferative retinopathy without macular edema, without long-term current use of insulin ([facility])    [facility] Mirza Gates MD    Office Visit    1 month ago Type 2 diabetes mellitus with both eyes affected by mild nonproliferative retinopathy without macular edema, without long-term current use of insulin ([facility])    [facility] Lashawn Mazariegos MD    Office Visit    2 months ago Type 2 diabetes mellitus with both eyes affected by mild nonproliferative retinopathy without macular edema, without long-term current use of insulin ([facility])    [facility] Mirza Gates MD    Office Visit    7 months ago Essential hypertension with goal blood pressure less than 130/85    [facility] Mirza [facility] MD    Office Visit    7 months ago Type 2 diabetes mellitus with both eyes affected by mild nonproliferative retinopathy without macular edema, without long-term current use of insulin ([facility])    [facility] Lashawn Mazairegos MD    Office Visit          Future Appointments         Provider Department Appt Notes    In 3 weeks Daniel Aldrich DO [facility] M10.9 (ICD-10-CM) - 274.00 (ICD-9-CM) - Gouty arthritis    In 4 months Lashawn Mazariegos MD Replaced by [facility] 6 months    In 5 months Mirza Gates MD [facility]                     Passed - Microalbumin procedure in past 12 months or taking ACE/ARB        Passed - EGFRCR or GFRNAA > 50     GFR Evaluation  EGFRCR: 57 , resulted on 9/10/2024          Passed - GFR in the past 12 months

## 2024-12-02 DIAGNOSIS — E11.65 TYPE 2 DIABETES MELLITUS WITH HYPERGLYCEMIA, WITHOUT LONG-TERM CURRENT USE OF INSULIN (HCC): ICD-10-CM

## 2024-12-02 NOTE — TELEPHONE ENCOUNTER
Endocrine Refill protocol for metformin    Protocol Criteria:  PASSED      If all below requirements are met, send a 90-day supply with 1 refill per provider protocol.     Verify appointment with Endocrinology completed in the last 6 months or scheduled in the next 3 months.  Verify A1C has been completed within the last 6 months and is below 8.5%  Verify last GFR is greater than or equal to 40 in the past 12 months    Last completed office visit:10/9/2024 Lashawn Mazariegos MD   Next scheduled Follow up:   Future Appointments   Date Time Provider Department Center   4/9/2025 10:00 AM Lashawn Mazariegos MD [facility]       Last GFR result:    Lab Results   Component Value Date    EGFRCR 57 (L) 09/10/2024     Last A1c result: Last A1C result: 7.4% done 10/9/2024.

## 2024-12-03 RX ORDER — METFORMIN HYDROCHLORIDE 500 MG/1
500 TABLET, EXTENDED RELEASE ORAL 2 TIMES DAILY WITH MEALS
Qty: 180 TABLET | Refills: 3 | Status: SHIPPED | OUTPATIENT
Start: 2024-12-03

## 2025-02-07 RX ORDER — LISINOPRIL 10 MG/1
10 TABLET ORAL DAILY
Qty: 90 TABLET | Refills: 3 | Status: SHIPPED | OUTPATIENT
Start: 2025-02-07

## 2025-02-07 NOTE — TELEPHONE ENCOUNTER
Refill passed per [facility] protocols.    Requested Prescriptions   Pending Prescriptions Disp Refills    LISINOPRIL 10 MG Oral Tab [Pharmacy Med Name: LISINOPRIL 10 MG TABLET] 90 tablet 3     Sig: TAKE 1 TABLET BY MOUTH EVERY DAY       Hypertension Medications Protocol Passed - 2/7/2025  4:24 PM        Passed - CMP or BMP in past 12 months        Passed - Last BP reading less than 140/90     BP Readings from Last 1 Encounters:   10/17/24 128/76               Passed - In person appointment or virtual visit in the past 12 mos or appointment in next 3 mos     Recent Outpatient Visits              3 months ago Type 2 diabetes mellitus with both eyes affected by mild nonproliferative retinopathy without macular edema, without long-term current use of insulin ([facility]    Office Visit    4 months ago Type 2 diabetes mellitus with both eyes affected by mild nonproliferative retinopathy without macular edema, without long-term current use of insulin ([facility]    Office Visit    5 months ago Type 2 diabetes mellitus with both eyes affected by mild nonproliferative retinopathy without macular edema, without long-term current use of insulin ([facility])    [facility] Mirza Andre MD    Office Visit    9 months ago Essential hypertension with goal blood pressure less than 130/85    [facility] Mirza Andre MD    Office Visit    10 months ago Type 2 diabetes mellitus with both eyes affected by mild nonproliferative retinopathy without macular edema, without long-term current use of insulin ([facility])    [facility]    Office Visit          Future Appointments         Provider Department Appt Notes    In 1 month  Daniel Aldrich DO [facility] M10.9 (ICD-10-CM) - 274.00 (ICD-9-CM) - Gouty arthritis    In 2 months Lashawn Mazariegos MD [facility] 6 months    In 2 months Mirza Gates MD [facility]                     Passed - EGFRCR or GFRNAA > 50     GFR Evaluation  EGFRCR: 57 , resulted on 9/10/2024          Passed - Medication is active on med list

## 2025-02-15 DIAGNOSIS — E03.9 HYPOTHYROIDISM, UNSPECIFIED TYPE: ICD-10-CM

## 2025-02-17 RX ORDER — LEVOTHYROXINE SODIUM 50 UG/1
50 TABLET ORAL
Qty: 90 TABLET | Refills: 3 | OUTPATIENT
Start: 2025-02-17

## 2025-02-17 NOTE — TELEPHONE ENCOUNTER
Hi!    Please let patient know that he has 3 refills of the levothyroxine 75mcg dose at the pharmacy. I had written the prescription in October 2024. Thank you!

## 2025-02-17 NOTE — TELEPHONE ENCOUNTER
Endocrine refill protocol for medications for hypothyroidism and hyperthyroidism    Protocol Criteria:  PASSED Reason: N/A    If all below requirements are met, send a 90-day supply with 1 refill per provider protocol.    Verify appointment with Endocrinology completed in the last 12 months or scheduled in the next 6 months.    Normal TSH result in the past 12 months   Review recent telephone encounters and mychart communications with patient to ensure a dose change has not occurred since last office visit that was not updated in the medication history list     Last completed office visit:10/9/2024 Lashawn Mazariegos MD   Next scheduled Follow up:   Future Appointments   Date Time Provider Department Center   3/28/2025 12:00 PM Daniel Aldrich DO [facility]   4/9/2025 10:00 AM Lashawn Mazariegos MD [facility]      Last TSH result:   TSH   Date Value Ref Range Status   09/10/2024 2.778 0.550 - 4.780 mIU/mL Final     TSH (S)   Date Value Ref Range Status   05/13/2016 5.95 (H) 0.34 - 5.60 uIU/mL Final     Comment:     For children less than 1 month- High levels of TSH  may be present in the first 30 minutes of life and  may be slightly elevated for the first 72 hours.  TSH concentrations usually return to normal by 14  days but occasionally require 28 days.

## 2025-02-18 NOTE — TELEPHONE ENCOUNTER
Spoke with Yvette from [facility] pharmacy, she stated we could disregard refill request     Lvm for pt, informing him, that there are refills on his Levothyroxine at his [facility] pharmacy.

## 2025-03-28 ENCOUNTER — OFFICE VISIT (OUTPATIENT)
Age: 89
End: 2025-03-28

## 2025-03-28 VITALS
HEART RATE: 90 BPM | WEIGHT: 184 LBS | HEIGHT: 69 IN | DIASTOLIC BLOOD PRESSURE: 62 MMHG | BODY MASS INDEX: 27.25 KG/M2 | SYSTOLIC BLOOD PRESSURE: 113 MMHG

## 2025-03-28 DIAGNOSIS — M1A.0720 CHRONIC GOUT OF LEFT FOOT, UNSPECIFIED CAUSE: Primary | ICD-10-CM

## 2025-03-28 DIAGNOSIS — M06.4 INFLAMMATORY POLYARTHRITIS (HCC): ICD-10-CM

## 2025-03-28 PROCEDURE — 99214 OFFICE O/P EST MOD 30 MIN: CPT | Performed by: INTERNAL MEDICINE

## 2025-03-28 RX ORDER — ALLOPURINOL 100 MG/1
200 TABLET ORAL DAILY
Qty: 180 TABLET | Refills: 3 | Status: SHIPPED | OUTPATIENT
Start: 2025-03-28

## 2025-03-28 NOTE — PROGRESS NOTES
RHEUMATOLOGY CLINIC- NEW PATIENT    Morteza Tubbs is a 89 year old male.    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 Inflammatory polyarthritis (HCC)  M06.4         DISCUSSION:  Patient presents as a new outpatient referral for non-- tophaceous gout with his first overt attack occurring August 2024.  While this is the first time the patient presents for a gout attack, prior x-ray reviewed and with chronic gouty changes so it would be reasonable to continue allopurinol with patient titrated to allopurinol 200 mg daily without signs of additional flare.  Last uric acid nearly at goal, 6.1.  Discussed with patient that while goal is technically less than 6, as he is tolerating the dose without recent flare, could monitor for now.  Can consider further titration if he flares again so we will hold on prednisone at this time.    PLAN:  -Continue allopurinol 200 mg daily.  Goal uric acid less than 6  - Patient to contact office if flares and can prescribe a prednisone taper.  Would also consider further titration of allopurinol at that time  - Prior lab work/imaging reviewed, as below  - Consult/evaluation communicated with referring physician/provider.    Patient will follow-up as needed    Daniel Aldrich DO  3/28/2025   1:16 PM      HPI:     3/28/2025 Initial Consult: Citizen of [place] : Eliseo at the bedside     I had the pleasure of seeing Morteza Tubbs on 3/28/2025 as a new outpatient consultation for gout. The patient was originally referred by his PCP.     89 year old male w/ PMH DM2, HTN, HLD, hypothyroidism, lumbosacral DDD status post lumbar laminectomy who presents to clinic today.  Patient had originally presented to the ED 8/18 for severe left first toe pain and swelling.  Patient stated he had hit his toe about 3 days prior to the ED then started to develop the pain and swelling.  No fever no chills.  X-rays and labs ordered, as below.  Patient prescribed a course of  prednisone, Keflex, and as needed Norco 10.    Patient denies prior history of of gout but notes that his toe was hot and swollen at the time of the ED visit.  The steroids did help his pain.  Since then, has been titrated from allopurinol 100 mg daily to 200 mg daily.  Family history notable for a son with gout but otherwise no other autoimmune history of SLE, rheumatoid arthritis, psoriasis.  ROS otherwise negative for unexplained fever, chills, photosensitive rash, Raynaud's phenomenon, serositis, uveitis/iritis.    Current Medications:  allopurinol 200 mg daily     Medication History:  N/A    Interval History:   See Above    HISTORY:  Past Medical History:    Acquired hypothyroidism    Acute right-sided low back pain with right-sided sciatica    Acute URI    Diabetes (HCC)    Greater trochanteric bursitis of both hips    Lumbar radiculopathy, acute    Lumbar trigger point syndrome    Obesity, unspecified    Osteoarthrosis, unspecified whether generalized or localized, unspecified site    Physical exam, annual    Right shoulder pain    Special screening for malignant neoplasm of prostate    Strain of gluteus medius    Subclinical hyperthyroidism    Subclinical hyperthyroidism    Tubular adenoma of colon    per ng polypectomy    Unspecified essential hypertension      Social Hx Reviewed   Family Hx Reviewed     Medications (Active prior to today's visit):  Current Outpatient Medications   Medication Sig Dispense Refill    lisinopril 10 MG Oral Tab Take 1 tablet (10 mg total) by mouth daily. 90 tablet 3    metFORMIN  MG Oral Tablet 24 Hr TAKE 1 TABLET BY MOUTH TWICE A DAY WITH MEALS 180 tablet 3    glipiZIDE 5 MG Oral Tab Take 1 tablet (5 mg total) by mouth 2 (two) times daily before meals. 180 tablet 3    simvastatin 20 MG Oral Tab Take 1 tablet (20 mg total) by mouth nightly. 90 tablet 3    ACCU-CHEK GUIDE In Vitro Strip USE TO TEST BLOOD SUGAR READINGS ONCE A DAY. DX CODE:E11.3293 100 strip 1     cyclobenzaprine 10 MG Oral Tab Take 1 tablet (10 mg total) by mouth every 12 (twelve) hours as needed for Muscle spasms. 180 tablet 1    levothyroxine 75 MCG Oral Tab Take 1 tablet (75 mcg total) by mouth before breakfast. 90 tablet 3    allopurinol 100 MG Oral Tab Take 2 tablets (200 mg total) by mouth daily. 180 tablet 1    Accu-Chek Softclix Lancets Does not apply Misc Test 3x daily 300 each 1    Blood Glucose Monitoring Suppl (ACCU-CHEK GUIDE) w/Device Does not apply Kit Test 3x daily 1 kit 0    gabapentin 300 MG Oral Cap Take 1 capsule (300 mg total) by mouth 3 (three) times daily. (Patient not taking: Reported on 10/17/2024) 90 capsule 2       Allergies:  Allergies[1]      ROS:   Review of Systems   Constitutional:  Negative for chills and fever.   HENT:  Negative for congestion, hearing loss, mouth sores, nosebleeds and trouble swallowing.    Eyes:  Negative for photophobia, pain, redness and visual disturbance.   Respiratory:  Negative for cough and shortness of breath.    Cardiovascular:  Negative for chest pain, palpitations and leg swelling.   Gastrointestinal:  Negative for abdominal pain, blood in stool, diarrhea and nausea.   Endocrine: Negative for cold intolerance and heat intolerance.   Genitourinary:  Negative for dysuria, frequency and hematuria.   Musculoskeletal:  Positive for arthralgias. Negative for back pain, gait problem, joint swelling, myalgias, neck pain and neck stiffness.   Skin:  Negative for color change and rash.   Neurological:  Negative for dizziness, weakness, numbness and headaches.   Psychiatric/Behavioral:  Negative for confusion and dysphoric mood.         PHYSICAL EXAM:     Constitutional:  Well developed, Well nourished, No acute distress  HENT:  Normocephalic, Atraumatic, Bilateral external ears normal, Oropharynx moist, No oral exudates.  Neck: Normal range of motion, No tenderness, Supple, No stridor.  Eyes:  PERRL, EOMI, Conjunctiva normal, No discharge.  Respiratory:   Normal breath sounds, No respiratory distress, No wheezing.  Cardiovascular:  Normal heart rate, Normal rhythm, No murmurs, No rubs, No gallops.  GI:  Bowel sounds normal, Soft, No tenderness, No masses, No pulsatile masses.  : No CVA tenderness.  Musculoskeletal:  A comprehensive 28 count joint exam was done and was negative for swelling or tenderness except as noted. Inspections for misalignment, asymmetry, crepitation, defects, tenderness, masses, nodules, effusions, range of motion, and stability in the upper and lower extremities bilaterally are all normal unless noted.           Integument:  Warm, Dry, No erythema, No rash.  Lymphatic:  No lymphadenopathy noted.  Neurologic:  Alert & oriented x 3, Normal motor function, Normal sensory function, No focal deficits noted.  Psychiatric:  Affect normal, Judgment normal, Mood normal.    LABS:     Prior lab work reviewed and notable for:     10/9/2024:  Hemoglobin A1c 7.4    9/10/2024:  Uric acid 6.1  TSH 2.778 WNL    CMP with BUN 20, CR 1.22, LFTs nonelevated, no gamma gap  CBC with normal WBC, Hg 12.2 normocytic,  WNL    2024:  Uric acid 6.8    Imagin2024 left first toe XR:  Impression   CONCLUSION:     Irregularity of the dorsal aspect of the first proximal phalanx, only on one view.  Finding could represent artifact or age-indeterminate nondisplaced fracture.  Correlate with point tenderness at this location.     Imaging findings which can represent sequela of gout involving the first metatarsophalangeal and interphalangeal joints.     Mild first metatarsophalangeal joint osteoarthritis.       2019 lumbar spine MRI:  Impression   CONCLUSION:     1. Multilevel degenerative changes of the lumbar spine as detailed.  Notable levels as follows:     2. L4-L5:  Large central disc extrusion, which results in severe spinal canal stenosis.  There is also minimal right greater than left neural foraminal stenosis.  3. L5-S1:  Mild right  lateral recess stenosis related to a right subarticular zone disc protrusion with slight effacement of the traversing right S1 nerve root.  Minor bilateral neural foraminal stenosis.  4. L3-L4:  Mild bilateral neural foraminal stenosis.  5. L1-L2:  Degenerative changes without significant neural compromise.       11/5/2019 bilateral hip XR:    Impression   CONCLUSION:  1. Minimal osteoarthritis both hips.  2. Demineralization.                     [1] No Known Allergies

## 2025-03-31 RX ORDER — ALLOPURINOL 100 MG/1
200 TABLET ORAL DAILY
Qty: 180 TABLET | Refills: 1 | OUTPATIENT
Start: 2025-03-31

## 2025-04-01 ENCOUNTER — LAB ENCOUNTER (OUTPATIENT)
Dept: LAB | Age: 89
End: 2025-04-01
Attending: INTERNAL MEDICINE
Payer: MEDICARE

## 2025-04-01 DIAGNOSIS — E11.3293 TYPE 2 DIABETES MELLITUS WITH BOTH EYES AFFECTED BY MILD NONPROLIFERATIVE RETINOPATHY WITHOUT MACULAR EDEMA, WITHOUT LONG-TERM CURRENT USE OF INSULIN (HCC): ICD-10-CM

## 2025-04-01 LAB
ALBUMIN SERPL-MCNC: 4.6 G/DL (ref 3.2–4.8)
ALBUMIN/GLOB SERPL: 2.3 {RATIO} (ref 1–2)
ALP LIVER SERPL-CCNC: 118 U/L
ALT SERPL-CCNC: 12 U/L
ANION GAP SERPL CALC-SCNC: 6 MMOL/L (ref 0–18)
AST SERPL-CCNC: 15 U/L (ref ?–34)
BILIRUB SERPL-MCNC: 0.4 MG/DL (ref 0.2–1.1)
BUN BLD-MCNC: 19 MG/DL (ref 9–23)
BUN/CREAT SERPL: 15.6 (ref 10–20)
CALCIUM BLD-MCNC: 9.8 MG/DL (ref 8.7–10.4)
CHLORIDE SERPL-SCNC: 104 MMOL/L (ref 98–112)
CHOLEST SERPL-MCNC: 122 MG/DL (ref ?–200)
CO2 SERPL-SCNC: 27 MMOL/L (ref 21–32)
CREAT BLD-MCNC: 1.22 MG/DL
CREAT UR-SCNC: 56.4 MG/DL
DEPRECATED RDW RBC AUTO: 39.8 FL (ref 35.1–46.3)
EGFRCR SERPLBLD CKD-EPI 2021: 57 ML/MIN/1.73M2 (ref 60–?)
ERYTHROCYTE [DISTWIDTH] IN BLOOD BY AUTOMATED COUNT: 13.5 % (ref 11–15)
EST. AVERAGE GLUCOSE BLD GHB EST-MCNC: 194 MG/DL (ref 68–126)
FASTING PATIENT LIPID ANSWER: YES
FASTING STATUS PATIENT QL REPORTED: YES
GLOBULIN PLAS-MCNC: 2 G/DL (ref 2–3.5)
GLUCOSE BLD-MCNC: 192 MG/DL (ref 70–99)
HBA1C MFR BLD: 8.4 % (ref ?–5.7)
HCT VFR BLD AUTO: 37.1 %
HDLC SERPL-MCNC: 36 MG/DL (ref 40–59)
HGB BLD-MCNC: 11.9 G/DL
LDLC SERPL CALC-MCNC: 68 MG/DL (ref ?–100)
MCH RBC QN AUTO: 26 PG (ref 26–34)
MCHC RBC AUTO-ENTMCNC: 32.1 G/DL (ref 31–37)
MCV RBC AUTO: 81 FL
MICROALBUMIN UR-MCNC: 0.5 MG/DL
MICROALBUMIN/CREAT 24H UR-RTO: 8.9 UG/MG (ref ?–30)
NONHDLC SERPL-MCNC: 86 MG/DL (ref ?–130)
OSMOLALITY SERPL CALC.SUM OF ELEC: 291 MOSM/KG (ref 275–295)
PLATELET # BLD AUTO: 206 10(3)UL (ref 150–450)
POTASSIUM SERPL-SCNC: 4.7 MMOL/L (ref 3.5–5.1)
PROT SERPL-MCNC: 6.6 G/DL (ref 5.7–8.2)
RBC # BLD AUTO: 4.58 X10(6)UL
SODIUM SERPL-SCNC: 137 MMOL/L (ref 136–145)
T3FREE SERPL-MCNC: 3.25 PG/ML (ref 2.4–4.2)
T4 FREE SERPL-MCNC: 1.6 NG/DL (ref 0.8–1.7)
TRIGL SERPL-MCNC: 95 MG/DL (ref 30–149)
TSI SER-ACNC: 0.06 UIU/ML (ref 0.55–4.78)
VLDLC SERPL CALC-MCNC: 14 MG/DL (ref 0–30)
WBC # BLD AUTO: 5.6 X10(3) UL (ref 4–11)

## 2025-04-01 PROCEDURE — 85027 COMPLETE CBC AUTOMATED: CPT

## 2025-04-01 PROCEDURE — 36415 COLL VENOUS BLD VENIPUNCTURE: CPT

## 2025-04-01 PROCEDURE — 80061 LIPID PANEL: CPT

## 2025-04-01 PROCEDURE — 82570 ASSAY OF URINE CREATININE: CPT

## 2025-04-01 PROCEDURE — 82043 UR ALBUMIN QUANTITATIVE: CPT

## 2025-04-01 PROCEDURE — 84439 ASSAY OF FREE THYROXINE: CPT

## 2025-04-01 PROCEDURE — 80053 COMPREHEN METABOLIC PANEL: CPT

## 2025-04-01 PROCEDURE — 84481 FREE ASSAY (FT-3): CPT

## 2025-04-01 PROCEDURE — 83036 HEMOGLOBIN GLYCOSYLATED A1C: CPT

## 2025-04-01 PROCEDURE — 84443 ASSAY THYROID STIM HORMONE: CPT

## 2025-04-16 ENCOUNTER — OFFICE VISIT (OUTPATIENT)
Dept: INTERNAL MEDICINE CLINIC | Facility: CLINIC | Age: 89
End: 2025-04-16

## 2025-04-16 VITALS
WEIGHT: 188 LBS | DIASTOLIC BLOOD PRESSURE: 66 MMHG | SYSTOLIC BLOOD PRESSURE: 118 MMHG | OXYGEN SATURATION: 98 % | HEART RATE: 85 BPM | HEIGHT: 69 IN | BODY MASS INDEX: 27.85 KG/M2 | TEMPERATURE: 97 F

## 2025-04-16 DIAGNOSIS — M1A.0720 CHRONIC GOUT OF LEFT FOOT, UNSPECIFIED CAUSE: ICD-10-CM

## 2025-04-16 DIAGNOSIS — E78.5 DYSLIPIDEMIA: ICD-10-CM

## 2025-04-16 DIAGNOSIS — I10 ESSENTIAL HYPERTENSION WITH GOAL BLOOD PRESSURE LESS THAN 130/85: Primary | ICD-10-CM

## 2025-04-16 DIAGNOSIS — M06.4 INFLAMMATORY POLYARTHRITIS (HCC): ICD-10-CM

## 2025-04-16 DIAGNOSIS — R91.1 LUNG NODULE: ICD-10-CM

## 2025-04-16 DIAGNOSIS — E03.9 HYPOTHYROIDISM, UNSPECIFIED TYPE: ICD-10-CM

## 2025-04-16 DIAGNOSIS — M51.379 DEGENERATION OF INTERVERTEBRAL DISC OF LUMBOSACRAL REGION, UNSPECIFIED WHETHER PAIN PRESENT: ICD-10-CM

## 2025-04-16 DIAGNOSIS — E11.3293 TYPE 2 DIABETES MELLITUS WITH BOTH EYES AFFECTED BY MILD NONPROLIFERATIVE RETINOPATHY WITHOUT MACULAR EDEMA, WITHOUT LONG-TERM CURRENT USE OF INSULIN (HCC): ICD-10-CM

## 2025-04-16 RX ORDER — CYCLOBENZAPRINE HCL 10 MG
10 TABLET ORAL EVERY 12 HOURS PRN
COMMUNITY
Start: 2025-04-02

## 2025-04-16 NOTE — PROGRESS NOTES
Subjective:   Morteza Tubbs is a 89 year old male who presents for a Medicare Wellness Visit charge within the last 11 months and Patient may not meet criteria for AWV: Please evaluate for correct coding and scheduled follow up of multiple significant but stable problems and acute exacerbation of a chronic illness.   History of Present Illness    89-year-old gentleman here for Medicare annual wellness visit.  His hemoglobin A1c has gone up.  No abuse no depression reported.  No falls reported.  Discussed regarding urinary leakage.-Nothing reported  History/Other:   Fall Risk Assessment:   He has been screened for Falls and is low risk.      Cognitive Assessment:   He had a completely normal cognitive assessment - see flowsheet entries     Functional Ability/Status:   Morteza uTbbs has a completely normal functional assessment. See flowsheet for details.      Depression Screening (PHQ):  PHQ-2 SCORE: 0  , done 4/16/2025        <5 minutes spent screening and counseling for depression    Advanced Directives:   He does NOT have a Living Will. [Do you have a living will?: No]  He does NOT have a Power of  for Health Care. [Do you have a healthcare power of ?: No]  Discussed Advance Care Planning with patient (and family/surrogate if present). Standard forms made available to patient in After Visit Summary.      Problem List[1]  Allergies:  He has no known allergies.    Current Medications:  Active Meds, Sig Only[2]    Medical History:  He  has a past medical history of Acquired hypothyroidism (6/3/2019), Acute right-sided low back pain with right-sided sciatica (10/15/2019), Acute URI (4/27/2016), Diabetes ([facility]), Greater trochanteric bursitis of both hips (11/5/2019), Lumbar radiculopathy, acute (11/21/2019), Lumbar trigger point syndrome (11/5/2019), Obesity, unspecified, Osteoarthrosis, unspecified whether generalized or localized, unspecified site, Physical exam, annual (3/9/2021), Right  shoulder pain (11/24/2014), Special screening for malignant neoplasm of prostate (12/22/2011), Strain of gluteus medius (11/5/2019), Subclinical hyperthyroidism (4/5/2021), Subclinical hyperthyroidism (4/5/2021), Tubular adenoma of colon (2009), and Unspecified essential hypertension.  Surgical History:  He  has a past surgical history that includes other surgical history (Right) and electrocardiogram, complete (10/29/13).   Family History:  His family history includes Diabetes in his brother; Heart Disease in an other family member; Heart Disorder in his father and mother; Lipids in an other family member.  Social History:  He  reports that he quit smoking about 5 years ago. His smoking use included cigarettes. He started smoking about 55 years ago. He has a 12.5 pack-year smoking history. He has never used smokeless tobacco. He reports current alcohol use of about 2.5 standard drinks of alcohol per week. He reports that he does not use drugs.    Tobacco:  He smoked tobacco in the past but quit greater than 12 months ago.  Tobacco Use[3]     CAGE Alcohol Screen:   CAGE screening score of 0 on 4/16/2025, showing low risk of alcohol abuse.      Patient Care Team:  Mirza Gates MD as PCP - General (Internal Medicine)    Review of Systems   Constitutional:  Negative for activity change, appetite change and fever.   HENT:  Negative for congestion and voice change.    Respiratory:  Negative for cough and shortness of breath.    Cardiovascular:  Negative for chest pain.   Gastrointestinal:  Negative for abdominal distention, abdominal pain and vomiting.   Genitourinary:  Negative for hematuria.   Skin:  Negative for wound.   Psychiatric/Behavioral:  Negative for behavioral problems.          Objective:   Physical Exam  Constitutional:       Appearance: Normal appearance.   HENT:      Head: Normocephalic.   Eyes:      Conjunctiva/sclera: Conjunctivae normal.   Cardiovascular:      Rate and Rhythm: Normal rate and  regular rhythm.      Heart sounds: Normal heart sounds. No murmur heard.  Pulmonary:      Effort: Pulmonary effort is normal.      Breath sounds: Normal breath sounds. No rhonchi or rales.   Abdominal:      General: Bowel sounds are normal.      Palpations: Abdomen is soft.      Tenderness: There is no abdominal tenderness.   Musculoskeletal:      Cervical back: Neck supple.      Right lower leg: No edema.      Left lower leg: No edema.   Skin:     General: Skin is warm and dry.   Neurological:      General: No focal deficit present.      Mental Status: He is alert and oriented to person, place, and time. Mental status is at baseline.   Psychiatric:         Mood and Affect: Mood normal.         Behavior: Behavior normal.         /66   Pulse 85   Temp 97.3 °F (36.3 °C) (Temporal)   Ht 5' 9\" (1.753 m)   Wt 188 lb (85.3 kg)   SpO2 98%   BMI 27.76 kg/m²  Estimated body mass index is 27.76 kg/m² as calculated from the following:    Height as of this encounter: 5' 9\" (1.753 m).    Weight as of this encounter: 188 lb (85.3 kg).    Medicare Hearing Assessment:   Hearing Screening    Time taken: 4/16/2025 11:07 AM  Screening Method: Questionnaire  I have a problem hearing over the telephone: No I have trouble following the conversations when two or more people are talking at the same time: No   I have trouble understanding things on the TV: No I have to strain to understand conversations: No   I have to worry about missing the telephone ring or doorbell: No I have trouble hearing conversations in a noisy background such as a crowded room or restaurant: No   I get confused about where sounds come from: No I misunderstand some words in a sentence and need to ask people to repeat themselves: No   I especially have trouble understanding the speech of women and children: No I have trouble understanding the speaker in a large room such as at a meeting or place of Yazidi: No   Many people I talk to seem to mumble (or  don't speak clearly): No People get annoyed because I misunderstand what they say: No   I misunderstand what others are saying and make inappropriate responses: No I avoid social activities because I cannot hear well and fear I will reply improperly: No   Family members and friends have told me they think I may have hearing loss: No                   Assessment & Plan:   Morteza Tubbs is a 89 year old male who presents for a Medicare Assessment.     1. Essential hypertension with goal blood pressure less than 130/85 (Primary)-controlled  2. Dyslipidemia-continue statins  3. Lung nodule  Overview:  Last 1/22  Stable for more than 2 years  4. Hypothyroidism, unspecified type continue levothyroxine monitor TSH   5. Degeneration of intervertebral disc of lumbosacral region, unspecified whether pain present stable  6. Chronic gout of left foot, unspecified cause stable  Overview:  Follows up with rheumatology    Inflammatory polyarthritis-reviewed rheumatology consultation note.  Stable.  Continue to follow with rheumatology.    Additional evaluation apart from Medicare annual wellness visit  7. Type 2 diabetes mellitus with both eyes affected by mild nonproliferative retinopathy without macular edema, without long-term current use of insulin (HCC)-his hemoglobin A1c has gone up.  Blood sugars are running in 170 range.  No hypoglycemic events reported.  Increase metformin to thousand twice daily.  Continue glipizide 5 mg twice daily.  Monitor blood sugars at home.  I will recheck hemoglobin A1c in 3 months.  I will follow-up on the results.    Informed patient to complete Shingrix vaccine from the pharmacy.  He will complete his eye exam with Dr. lynn  -     Hemoglobin A1C; Future; Expected date: 07/16/2025  -     Ophthalmology Referral - External  Other orders  -     metFORMIN HCl; Take 2 tablets (1,000 mg total) by mouth 2 (two) times daily.  Dispense: 360 tablet; Refill: 2    Assessment & Plan    The patient  indicates understanding of these issues and agrees to the plan.  Reinforced healthy diet, lifestyle, and exercise.      No follow-ups on file.     Mirza Gates MD, 4/16/2025     Supplementary Documentation:   General Health:  In the past six months, have you lost more than 10 pounds without trying?: 2 - No  Has your appetite been poor?: No  Type of Diet: Diabetic  How does the patient maintain a good energy level?: Other, Daily Walks  How would you describe your daily physical activity?: Light  How would you describe your current health state?: Fair  How do you maintain positive mental well-being?: Visiting Friends, Visiting Family, Social Interaction  On a scale of 0 to 10, with 0 being no pain and 10 being severe pain, what is your pain level?: 0 - (None)  In the past six months, have you experienced urine leakage?: 0-No  At any time do you feel concerned for the safety/well-being of yourself and/or your children, in your home or elsewhere?: No  Have you had any immunizations at another office such as Influenza, Hepatitis B, Tetanus, or Pneumococcal?: No    Health Maintenance   Topic Date Due    Zoster Vaccines (1 of 2) Never done    COVID-19 Vaccine (4 - 2024-25 season) 09/01/2024    Annual Depression Screening  01/01/2025    Fall Risk Screening (Annual)  01/01/2025    Diabetes Care: Foot Exam (Annual)  01/01/2025    Annual Physical  04/16/2025    Diabetes Care Dilated Eye Exam  04/23/2025    Diabetes Care A1C  07/01/2025    Influenza Vaccine (Season Ended) 10/01/2025    Diabetes Care: GFR  04/01/2026    Diabetes Care: Microalb/Creat Ratio (Annual)  Completed    Pneumococcal Vaccine: 50+ Years  Completed    Meningococcal B Vaccine  Aged Out            [1]   Patient Active Problem List  Diagnosis    Tobacco use disorder    Type 2 diabetes mellitus with both eyes affected by mild nonproliferative retinopathy without macular edema, without long-term current use of insulin (HCC)    Essential hypertension with goal  blood pressure less than 130/85    Benign paroxysmal positional vertigo    DDD (degenerative disc disease), lumbosacral    Lumbar foraminal stenosis    Lung nodule    S/P lumbar laminectomy    Thyroid nodule    Hypothyroidism    Dyslipidemia    Vitamin D deficiency    Screening declined by patient    Inflammatory polyarthritis (HCC)    Chronic gout of left foot   [2]   Outpatient Medications Marked as Taking for the 25 encounter (Office Visit) with Mirza Gates MD   Medication Sig    cyclobenzaprine 10 MG Oral Tab Take 1 tablet (10 mg total) by mouth every 12 (twelve) hours as needed for Muscle spasms.    metFORMIN 500 MG Oral Tab Take 2 tablets (1,000 mg total) by mouth 2 (two) times daily.    allopurinol 100 MG Oral Tab Take 2 tablets (200 mg total) by mouth daily.    lisinopril 10 MG Oral Tab Take 1 tablet (10 mg total) by mouth daily.    glipiZIDE 5 MG Oral Tab Take 1 tablet (5 mg total) by mouth 2 (two) times daily before meals.    simvastatin 20 MG Oral Tab Take 1 tablet (20 mg total) by mouth nightly.    ACCU-CHEK GUIDE In Vitro Strip USE TO TEST BLOOD SUGAR READINGS ONCE A DAY. DX CODE:E11.3293    levothyroxine 75 MCG Oral Tab Take 1 tablet (75 mcg total) by mouth before breakfast.    Accu-Chek Softclix Lancets Does not apply Misc Test 3x daily    Blood Glucose Monitoring Suppl (ACCU-CHEK GUIDE) w/Device Does not apply Kit Test 3x daily   [3]   Social History  Tobacco Use   Smoking Status Former    Current packs/day: 0.00    Average packs/day: 0.3 packs/day for 50.0 years (12.5 ttl pk-yrs)    Types: Cigarettes    Start date: 1969    Quit date: 2019    Years since quittin.4   Smokeless Tobacco Never

## 2025-04-30 ENCOUNTER — NURSE TRIAGE (OUTPATIENT)
Dept: INTERNAL MEDICINE CLINIC | Facility: CLINIC | Age: 89
End: 2025-04-30

## 2025-04-30 ENCOUNTER — OFFICE VISIT (OUTPATIENT)
Dept: INTERNAL MEDICINE CLINIC | Facility: CLINIC | Age: 89
End: 2025-04-30

## 2025-04-30 VITALS
WEIGHT: 189.38 LBS | HEART RATE: 73 BPM | DIASTOLIC BLOOD PRESSURE: 72 MMHG | TEMPERATURE: 98 F | OXYGEN SATURATION: 98 % | SYSTOLIC BLOOD PRESSURE: 118 MMHG | HEIGHT: 69 IN | BODY MASS INDEX: 28.05 KG/M2

## 2025-04-30 DIAGNOSIS — M10.441 OTHER SECONDARY ACUTE GOUT OF RIGHT HAND: Primary | ICD-10-CM

## 2025-04-30 PROCEDURE — 99213 OFFICE O/P EST LOW 20 MIN: CPT | Performed by: NURSE PRACTITIONER

## 2025-04-30 RX ORDER — PREDNISONE 20 MG/1
20 TABLET ORAL DAILY
Qty: 5 TABLET | Refills: 0 | Status: SHIPPED | OUTPATIENT
Start: 2025-04-30 | End: 2025-05-05

## 2025-04-30 NOTE — PATIENT INSTRUCTIONS
Prednisone 20mg daily in the morning with food for 5 days    Elevate, apply ice 20 min at a time several times daily

## 2025-04-30 NOTE — PROGRESS NOTES
Subjective:   Morteza Tubbs is a 89 year old male with PMH inflammatory polyarthritis, gout, HTN HL who presents for Finger Pain (RT index finger, swelling, redness began yesterday.)     Accompanied by his daughter who is providing interpretation assistance as needed (offered video , he declined).    Accidentally struck/banged his R pointer finger on the counter yesterday. Later he noted swelling, redness, pain to the area.  Hx gout  Never in his fingers  Last gout flare happened after he accidentally kicked a hard object with his toe.  Takes allopurinol  Hasn't done any work in the yard (no exposures to bites or allergens that he is aware of). There are no open areas to the finger. No fevers/chills/systemic symptoms.  Took ibuprofen and iced yesterday with some relief.      History/Other:    Chief Complaint Reviewed and Verified  Nursing Notes Reviewed and   Verified  Tobacco Reviewed  Allergies Reviewed  Medications Reviewed    Problem List Reviewed  Medical History Reviewed  Surgical History   Reviewed  Family History Reviewed  Social History Reviewed         Tobacco:  He smoked tobacco in the past but quit greater than 12 months ago.  Tobacco Use[1]     Current Medications[2]      Review of Systems:  Review of Systems  10 point review of systems otherwise negative with the exception of HPI and assessment and plan.    Objective:   /72   Pulse 73   Temp 97.7 °F (36.5 °C) (Temporal)   Ht 5' 9\" (1.753 m)   Wt 189 lb 6.4 oz (85.9 kg)   SpO2 98%   BMI 27.97 kg/m²  Estimated body mass index is 27.97 kg/m² as calculated from the following:    Height as of this encounter: 5' 9\" (1.753 m).    Weight as of this encounter: 189 lb 6.4 oz (85.9 kg).      Physical Exam  Vitals reviewed.   Constitutional:       General: He is not in acute distress.     Appearance: Normal appearance. He is not ill-appearing or toxic-appearing.   Cardiovascular:      Rate and Rhythm: Normal rate.   Pulmonary:       Effort: Pulmonary effort is normal. No respiratory distress.   Musculoskeletal:      Comments: R pointer finger with erythema, swelling, warmth, tenderness to touch at PIP. ROM limited only due to swelling. Compartments are soft, brisk distal cap refill.   Neurological:      Mental Status: He is alert.         Assessment & Plan:   1. Other secondary acute gout of right hand (Primary)  -     predniSONE; Take 1 tablet (20 mg total) by mouth daily for 5 days.  Dispense: 5 tablet; Refill: 0  - Will treat for gout flare with previously used medication that was successful for him - prednisone 20mg daily x 5 days. Elevate and ice several times daily x 20 min each. Tylenol/ibuprofen for pain. Return for worsening/failure to improve after 48 hours on treatment.        Return if symptoms worsen or fail to improve.    JOSH Baez, 2025, 11:12 AM     This note was prepared using Dragon Medical voice recognition dictation software. As a result errors may occur. When identified, these errors have been corrected. While every attempt is made to correct errors during dictation discrepancies may still exist.       [1]   Social History  Tobacco Use   Smoking Status Former    Current packs/day: 0.00    Average packs/day: 0.3 packs/day for 50.0 years (12.5 ttl pk-yrs)    Types: Cigarettes    Start date: 1969    Quit date: 2019    Years since quittin.4   Smokeless Tobacco Never   [2]   Current Outpatient Medications   Medication Sig Dispense Refill    predniSONE 20 MG Oral Tab Take 1 tablet (20 mg total) by mouth daily for 5 days. 5 tablet 0    cyclobenzaprine 10 MG Oral Tab Take 1 tablet (10 mg total) by mouth every 12 (twelve) hours as needed for Muscle spasms.      metFORMIN 500 MG Oral Tab Take 2 tablets (1,000 mg total) by mouth 2 (two) times daily. 360 tablet 2    allopurinol 100 MG Oral Tab Take 2 tablets (200 mg total) by mouth daily. 180 tablet 3    lisinopril 10 MG Oral Tab Take 1 tablet (10  mg total) by mouth daily. 90 tablet 3    glipiZIDE 5 MG Oral Tab Take 1 tablet (5 mg total) by mouth 2 (two) times daily before meals. 180 tablet 3    simvastatin 20 MG Oral Tab Take 1 tablet (20 mg total) by mouth nightly. 90 tablet 3    ACCU-CHEK GUIDE In Vitro Strip USE TO TEST BLOOD SUGAR READINGS ONCE A DAY. DX CODE:E11.3293 100 strip 1    levothyroxine 75 MCG Oral Tab Take 1 tablet (75 mcg total) by mouth before breakfast. 90 tablet 3    Accu-Chek Softclix Lancets Does not apply Misc Test 3x daily 300 each 1    Blood Glucose Monitoring Suppl (ACCU-CHEK GUIDE) w/Device Does not apply Kit Test 3x daily 1 kit 0

## 2025-04-30 NOTE — TELEPHONE ENCOUNTER
Action Requested: Summary for Provider     []  Critical Lab, Recommendations Needed  [] Need Additional Advice  []   FYI    []   Need Orders  [] Need Medications Sent to Pharmacy  []  Other     SUMMARY: appt made , daughter stated pt have left index finger -red/swollen, painful to move     Reason for call: Finger Pain  Onset: yesterday                    Reason for Disposition   Looks infected (e.g., spreading redness, red streak, pus)    Protocols used: Finger Injury-A-OH

## 2025-05-15 DIAGNOSIS — M10.441 OTHER SECONDARY ACUTE GOUT OF RIGHT HAND: ICD-10-CM

## 2025-05-16 RX ORDER — PREDNISONE 20 MG/1
20 TABLET ORAL DAILY
Qty: 5 TABLET | Refills: 0 | OUTPATIENT
Start: 2025-05-16 | End: 2025-05-21

## 2025-06-05 DIAGNOSIS — E11.3293 TYPE 2 DIABETES MELLITUS WITH BOTH EYES AFFECTED BY MILD NONPROLIFERATIVE RETINOPATHY WITHOUT MACULAR EDEMA, WITHOUT LONG-TERM CURRENT USE OF INSULIN (HCC): ICD-10-CM

## 2025-06-05 RX ORDER — BLOOD SUGAR DIAGNOSTIC
STRIP MISCELLANEOUS
Qty: 100 STRIP | Refills: 1 | Status: SHIPPED | OUTPATIENT
Start: 2025-06-05

## 2025-06-05 NOTE — TELEPHONE ENCOUNTER
Endocrine Refill protocol for Glucose testing supplies     Protocol Criteria: PASSED Reason: N/A    If below requirement is met, send a 90-day supply with 1 refill per provider protocol.    Verify appointment with Endocrinology completed in the last 6 months or scheduled in the next 3 months.    Last completed office visit:10/9/2024 Lashawn Mazariegos MD   Last completed telemed visit: Visit date not found  Next scheduled Follow up:   Future Appointments   Date Time Provider Department Center   7/9/2025 11:00 AM Lashawn Mazariegos MD [facility]

## 2025-07-01 RX ORDER — CYCLOBENZAPRINE HCL 10 MG
10 TABLET ORAL EVERY 12 HOURS PRN
Qty: 180 TABLET | Refills: 1 | Status: SHIPPED | OUTPATIENT
Start: 2025-07-01

## 2025-07-01 NOTE — TELEPHONE ENCOUNTER
Please Review. Protocol Failed; No Protocol   Please advise medication is patient external reported or historical.     Requested Prescriptions   Pending Prescriptions Disp Refills    CYCLOBENZAPRINE 10 MG Oral Tab [Pharmacy Med Name: CYCLOBENZAPRINE 10 MG TABLET] 180 tablet 1     Sig: Take 1 tablet (10 mg total) by mouth every 12 (twelve) hours as needed for Muscle spasms.       There is no refill protocol information for this order

## 2025-07-09 ENCOUNTER — LAB ENCOUNTER (OUTPATIENT)
Dept: LAB | Age: 89
End: 2025-07-09
Attending: INTERNAL MEDICINE
Payer: MEDICARE

## 2025-07-09 ENCOUNTER — OFFICE VISIT (OUTPATIENT)
Dept: ENDOCRINOLOGY CLINIC | Facility: CLINIC | Age: 89
End: 2025-07-09

## 2025-07-09 VITALS
BODY MASS INDEX: 27.58 KG/M2 | HEIGHT: 69 IN | DIASTOLIC BLOOD PRESSURE: 57 MMHG | HEART RATE: 71 BPM | WEIGHT: 186.19 LBS | SYSTOLIC BLOOD PRESSURE: 108 MMHG

## 2025-07-09 DIAGNOSIS — E11.3293 TYPE 2 DIABETES MELLITUS WITH BOTH EYES AFFECTED BY MILD NONPROLIFERATIVE RETINOPATHY WITHOUT MACULAR EDEMA, WITHOUT LONG-TERM CURRENT USE OF INSULIN (HCC): Primary | ICD-10-CM

## 2025-07-09 DIAGNOSIS — E03.9 HYPOTHYROIDISM, UNSPECIFIED TYPE: ICD-10-CM

## 2025-07-09 DIAGNOSIS — E11.3293 TYPE 2 DIABETES MELLITUS WITH BOTH EYES AFFECTED BY MILD NONPROLIFERATIVE RETINOPATHY WITHOUT MACULAR EDEMA, WITHOUT LONG-TERM CURRENT USE OF INSULIN (HCC): ICD-10-CM

## 2025-07-09 LAB
CARTRIDGE EXPIRATION DATE: ABNORMAL DATE
EST. AVERAGE GLUCOSE BLD GHB EST-MCNC: 177 MG/DL (ref 68–126)
GLUCOSE BLOOD: 167
HBA1C MFR BLD: 7.8 % (ref ?–5.7)
HEMOGLOBIN A1C: 7.4 % (ref 4.3–5.6)
T4 FREE SERPL-MCNC: 1.2 NG/DL (ref 0.8–1.7)
TEST STRIP LOT #: NORMAL NUMERIC
TSI SER-ACNC: 1.91 UIU/ML (ref 0.55–4.78)

## 2025-07-09 PROCEDURE — 99214 OFFICE O/P EST MOD 30 MIN: CPT | Performed by: INTERNAL MEDICINE

## 2025-07-09 PROCEDURE — 82947 ASSAY GLUCOSE BLOOD QUANT: CPT | Performed by: INTERNAL MEDICINE

## 2025-07-09 PROCEDURE — 36415 COLL VENOUS BLD VENIPUNCTURE: CPT

## 2025-07-09 PROCEDURE — 84439 ASSAY OF FREE THYROXINE: CPT

## 2025-07-09 PROCEDURE — 83036 HEMOGLOBIN GLYCOSYLATED A1C: CPT

## 2025-07-09 PROCEDURE — 83036 HEMOGLOBIN GLYCOSYLATED A1C: CPT | Performed by: INTERNAL MEDICINE

## 2025-07-09 PROCEDURE — 84443 ASSAY THYROID STIM HORMONE: CPT

## 2025-07-09 RX ORDER — LEVOTHYROXINE SODIUM 75 UG/1
75 TABLET ORAL
Qty: 90 TABLET | Refills: 3 | Status: CANCELLED | OUTPATIENT
Start: 2025-07-09

## 2025-07-09 NOTE — PROGRESS NOTES
Name: Morteza Tubbs  Date: 7/09/25    Referring Physician: No ref. provider found    Patient here to follow up on Type 2 diabetes as well as Hypothyroidism    HISTORY OF PRESENT ILLNESS   Morteza Tubbs is a 89 year old male who presents for hypothyroidism for follow up.    At the first initial visit, he came to see me for a suppressed TSH, that he had a couple of months before seeing me. He was largely asymptomatic, and we basically just repeated and followed thyroid function tests.     After a few visits, we found that his TSH had increased and FT4 had decreased. He was started on levothyroxine 25mcg and asked to follow up in 3-4 months. We then increased his levothyroxine to 50mcg and he has been doing well on this. At the last visit, we continued him on this and his most recent TSH is within normal limits.     He also has type 2 diabetes and had been on metformin ER 1g PO bid and glimepiride 2mg PO bid for many years and recently, his HbA1c increased by 1 point. At the last visit, I had changed his glimepiride for glipizide 5mg PO bid. He eats healthy and exercises.     4/09/24:  He is now taking metformin ER 500mg PO bid and glipizide 5mg PO bid.   Blood Sugar- 151; HbA1c- 7.1    His most recent TSH is elevated. I have confirmed that he is taking the levothyroxine in the correct manner.     10/09/24: BS- 237; HbA1c- 7.4  Medications - Metformin - morning and evening;     7/09/25: Since the last visit, his PCP has increased the metformin to 1000mg PO bid and kept the glipizide 5mg PO bid.     Medical History:   Past Medical History:    Acquired hypothyroidism    Acute right-sided low back pain with right-sided sciatica    Acute URI    Diabetes (HCC)    Greater trochanteric bursitis of both hips    Lumbar radiculopathy, acute    Lumbar trigger point syndrome    Obesity, unspecified    Osteoarthrosis, unspecified whether generalized or localized, unspecified site    Physical exam, annual    Right shoulder  pain    Special screening for malignant neoplasm of prostate    Strain of gluteus medius    Subclinical hyperthyroidism    Subclinical hyperthyroidism    Tubular adenoma of colon    per ng polypectomy    Unspecified essential hypertension       Surgical History:   Past Surgical History:   Procedure Laterality Date    Electrocardiogram, complete  10/29/13    scanned into media tab    Other surgical history Right        Family History:  Family History   Problem Relation Age of Onset    Heart Disorder Father     Heart Disorder Mother     Diabetes Brother     Lipids Other     Heart Disease Other        Social History:   Social History     Socioeconomic History    Marital status:    Tobacco Use    Smoking status: Former     Current packs/day: 0.00     Average packs/day: 0.3 packs/day for 50.0 years (12.5 ttl pk-yrs)     Types: Cigarettes     Start date: 1969     Quit date: 2019     Years since quittin.6    Smokeless tobacco: Never   Vaping Use    Vaping status: Never Used   Substance and Sexual Activity    Alcohol use: Yes     Alcohol/week: 2.5 standard drinks of alcohol     Types: 3 Glasses of wine per week     Comment: socially    Drug use: No   Other Topics Concern    Caffeine Concern Yes     Comment: 1cup coffee    Exercise No   Social History Narrative    The patient does not use an assistive device..      The patient does live in a home with stairs.     Social Drivers of Health     Food Insecurity: No Food Insecurity (2025)    NCSS - Food Insecurity     Worried About Running Out of Food in the Last Year: No     Ran Out of Food in the Last Year: No   Transportation Needs: No Transportation Needs (2025)    NCSS - Transportation     Lack of Transportation: No   Housing Stability: Not At Risk (2025)    NCSS - Housing/Utilities     Has Housing: Yes     Worried About Losing Housing: No     Unable to Get Utilities: No       Medications:     Current Outpatient Medications:      metFORMIN 500 MG Oral Tab, Take 2 tablets (1,000 mg total) by mouth 2 (two) times daily., Disp: 360 tablet, Rfl: 2    glipiZIDE 5 MG Oral Tab, Take 1 tablet (5 mg total) by mouth 2 (two) times daily before meals., Disp: 180 tablet, Rfl: 3    levothyroxine 75 MCG Oral Tab, Take 1 tablet (75 mcg total) by mouth before breakfast., Disp: 90 tablet, Rfl: 3    cyclobenzaprine 10 MG Oral Tab, Take 1 tablet (10 mg total) by mouth every 12 (twelve) hours as needed for Muscle spasms., Disp: 180 tablet, Rfl: 1    ACCU-CHEK GUIDE TEST In Vitro Strip, USE TO TEST BLOOD SUGAR READINGS ONCE A DAY. DX CODE:E11.3293, Disp: 100 strip, Rfl: 1    allopurinol 100 MG Oral Tab, Take 2 tablets (200 mg total) by mouth daily., Disp: 180 tablet, Rfl: 3    lisinopril 10 MG Oral Tab, Take 1 tablet (10 mg total) by mouth daily., Disp: 90 tablet, Rfl: 3    simvastatin 20 MG Oral Tab, Take 1 tablet (20 mg total) by mouth nightly., Disp: 90 tablet, Rfl: 3    Accu-Chek Softclix Lancets Does not apply Misc, Test 3x daily, Disp: 300 each, Rfl: 1    Blood Glucose Monitoring Suppl (ACCU-CHEK GUIDE) w/Device Does not apply Kit, Test 3x daily, Disp: 1 kit, Rfl: 0     Allergies:   No Known Allergies    REVIEW OF SYSTEMS  Eyes: no change in vision  Neurologic: no headache, generalized or focal weakness or numbness.  Head: normal  ENT: normal  Lungs: no shortness of breath, wheezing or KIM  Cardiovascular:  no chest pain or palpitations  Gastrointestinal:  no abdominal pain, bowel movement problems  Musculoskeletal: no muscle pain or arthralgia  /Gyne: no frequency or discomfort while urinating  Psychiatric:  no acute distress, anxiety  or depression  Skin: normal moisturized skin    PHYSICAL EXAMINATION:  Vitals:    07/09/25 1035   BP: 108/57   Pulse: 71   Weight: 186 lb 3.2 oz (84.5 kg)   Height: 5' 9\" (1.753 m)       General Appearance:  Alert, in no acute distress, well developed  Eyes: normal conjunctivae, sclera.  Neuro:  sensory grossly intact and  motor grossly intact  Psychiatric:  oriented to time, self, and place  Nutritional:  no abnormal weight gain or loss  Bilateral barefoot skin diabetic exam is normal, visualized feet and the appearance is normal.  Bilateral monofilament/sensation of both feet is normal.  Pulsation pedal pulse exam of both lower legs/feet is normal as well.    Labs:  Date  TSH  FT4 FT3 LT4  03/20/20 6.330  0.8  07/06/20 7.610  0.9   03/10/21 <0.005  1.6 3.98  05/12/21 2.50  0.8 2.23    08/20/21 5.90  0.9  03/18/22 6.530  0.8  09/23/22 9.670  0.7  01/27/23 9.780  0.8  25  05/16/23 5.180  0.9  50  08/02/23 1.910  1.0  50  02/23/24 7.360  1.0  50  03/29/24 8.113  1.1  50   09/10/24 2.778    75   04/01/25 0.057  1.6  75       Imaging:  Reviewed      ASSESSMENT/PLAN: -This is a 89 year-old male patient with hypothyroidism and Type 2 diabetes. Type 2 diabetes is largely being followed by his PCP.     - We discussed the diagnosis of hypothyroidism.  - We discussed the nonspecific nature of the symptoms of thyroid disease.  - We discussed that occasionally we require optimization of thyroid levels to TSH 1.0  - We also discussed that if symptoms do not improve on optimized levels then we can always consider combination treatment with T4 and T3  - I reminded the patient about the proper way in which to take the medication (an hour before any medications or food and that they may take two the next day if they forget to take a dose today) and they acknowledged understanding.    - He may continue on Metformin 1000mg PO bid and Glipizide 5mg PO bid and simvastatin  - I would like him to continue the levothyroxine 75mcg for now, but I would like to recheck TSH and FT4 and adjust dose if necessary.    I have gone over the correct way to take the levothyroxine and he will now wait for 1 hour before eating.    Return to clinic in 6 months with labs      Prior to this encounter, I spent over 15 minutes with preparing for the visit, including reviewing  documents from other specialties as well as from PCP and going over test results. During the face to face encounter, I spent an additional 15 minutes which were determined for follow-up. Greater than 50% of the time was spent in counseling, anticipatory guidance, and coordination of care. Patient concerns were answered to the best of my knowledge.         7/09/25  Lashawn Mazariegos MD

## 2025-07-11 ENCOUNTER — NURSE TRIAGE (OUTPATIENT)
Dept: INTERNAL MEDICINE CLINIC | Facility: CLINIC | Age: 89
End: 2025-07-11

## 2025-07-11 NOTE — TELEPHONE ENCOUNTER
Action Requested: Summary for Provider     []  Critical Lab, Recommendations Needed  [] Need Additional Advice  []   FYI    []   Need Orders  [] Need Medications Sent to Pharmacy  [x]  Other     SUMMARY: Verified name and  of patient.    Daughter of patient (on release of information) calling with patient beside her to state that patient has had left knee pain to the back of his knee for about a month.    Patient states that he is able to walk okay but that when he tries to climb steps, the pain worsens and he has to limp at times.    Patient denies any swelling or redness at this time.    Appointment scheduled:  Future Appointments   Date Time Provider Department Center   2025  4:00 PM Mirza Gates MD [facility]   10/16/2025 10:00 AM Mirza Gates MD [facility]       Reason for call: Acute  Onset: Data Unavailable      Reason for Disposition   MODERATE pain (e.g., symptoms interfere with work or school, limping) and present > 3 days    Protocols used: Knee Pain-A-[place]

## 2025-07-14 ENCOUNTER — OFFICE VISIT (OUTPATIENT)
Dept: INTERNAL MEDICINE CLINIC | Facility: CLINIC | Age: 89
End: 2025-07-14

## 2025-07-14 VITALS
OXYGEN SATURATION: 94 % | HEART RATE: 74 BPM | BODY MASS INDEX: 28.14 KG/M2 | WEIGHT: 190 LBS | TEMPERATURE: 98 F | SYSTOLIC BLOOD PRESSURE: 124 MMHG | HEIGHT: 69 IN | DIASTOLIC BLOOD PRESSURE: 74 MMHG

## 2025-07-14 DIAGNOSIS — E78.5 DYSLIPIDEMIA: ICD-10-CM

## 2025-07-14 DIAGNOSIS — G89.29 CHRONIC PAIN OF LEFT KNEE: ICD-10-CM

## 2025-07-14 DIAGNOSIS — M25.562 CHRONIC PAIN OF LEFT KNEE: ICD-10-CM

## 2025-07-14 DIAGNOSIS — I10 ESSENTIAL HYPERTENSION WITH GOAL BLOOD PRESSURE LESS THAN 130/85: ICD-10-CM

## 2025-07-14 DIAGNOSIS — E11.3293 TYPE 2 DIABETES MELLITUS WITH BOTH EYES AFFECTED BY MILD NONPROLIFERATIVE RETINOPATHY WITHOUT MACULAR EDEMA, WITHOUT LONG-TERM CURRENT USE OF INSULIN (HCC): Primary | ICD-10-CM

## 2025-07-14 PROCEDURE — 99214 OFFICE O/P EST MOD 30 MIN: CPT | Performed by: INTERNAL MEDICINE

## 2025-07-14 PROCEDURE — G2211 COMPLEX E/M VISIT ADD ON: HCPCS | Performed by: INTERNAL MEDICINE

## 2025-07-14 NOTE — PROGRESS NOTES
Morteza Tubbs is a 89 year old male.  Chief Complaint   Patient presents with    Knee Pain     Pain in left knee     HPI:        Morteza Tubbs is an 89 year old male who presents for a follow-up visit.    He is on lisinopril for hypertension, metformin and glipizide for diabetes, and statins for hyperlipidemia. His HbA1c has increased from 7.4 to 7.8. He takes metformin 500 mg in the morning and 1000 mg at night, and glipizide 5 mg in the morning and 5 mg at night. Home blood sugar monitoring shows a recent reading of 155 mg/dL.    He experiences knee pain, likely due to arthritis, which occurs when he straightens his knee. He has not consulted an arthritis specialist recently. He drove himself to the appointment.       Current Medications[1]   Past Medical History[2]   Past Surgical History[3]   Social History:  Short Social Hx on File[4]   Family History[5]   Allergies[6]     REVIEW OF SYSTEMS:   Review of Systems   Review of Systems   Constitutional: Negative for activity change, appetite change and fever.   HENT: Negative for congestion and voice change.    Respiratory: Negative for cough and shortness of breath.    Cardiovascular: Negative for chest pain.   Gastrointestinal: Negative for abdominal distention, abdominal pain and vomiting.   Genitourinary: Negative for hematuria.   Skin: Negative for wound.   Psychiatric/Behavioral: Negative for behavioral problems.  Knee pain present  Wt Readings from Last 5 Encounters:   07/14/25 190 lb (86.2 kg)   07/09/25 186 lb 3.2 oz (84.5 kg)   04/30/25 189 lb 6.4 oz (85.9 kg)   04/16/25 188 lb (85.3 kg)   03/28/25 184 lb (83.5 kg)     Body mass index is 28.06 kg/m².      EXAM:   /74   Pulse 74   Temp 97.7 °F (36.5 °C) (Temporal)   Ht 5' 9\" (1.753 m)   Wt 190 lb (86.2 kg)   SpO2 94%   BMI 28.06 kg/m²   Physical Exam   Constitutional:       Appearance: Normal appearance.   HENT:      Head: Normocephalic.   Eyes:      Conjunctiva/sclera: Conjunctivae  normal.   Cardiovascular:      Rate and Rhythm: Normal rate and regular rhythm.      Heart sounds: Normal heart sounds. No murmur heard.  Pulmonary:      Effort: Pulmonary effort is normal.      Breath sounds: Normal breath sounds. No rhonchi or rales.   Abdominal:      General: Bowel sounds are normal.      Palpations: Abdomen is soft.      Tenderness: There is no abdominal tenderness.   Musculoskeletal:      Cervical back: Neck supple.      Right lower leg: No edema.      Left lower leg: No edema.   Skin:     General: Skin is warm and dry.   Neurological:      General: No focal deficit present.      Mental Status: He is alert and oriented to person, place, and time. Mental status is at baseline.   Psychiatric:         Mood and Affect: Mood normal.         Behavior: Behavior normal.   Knee swelling present bilaterally.  Crepitus present bilaterally.    ASSESSMENT AND PLAN:   1. Type 2 diabetes mellitus with both eyes affected by mild nonproliferative retinopathy without macular edema, without long-term current use of insulin (HCC)      2. Essential hypertension with goal blood pressure less than 130/85      3. Dyslipidemia  Lab Results   Component Value Date    LDL 68 04/01/2025    AST 15 04/01/2025    ALT 12 04/01/2025      Encouraged patient to eat healthy.  Avoid fat fried foods and increase activity as tolerated.  We will monitor lipid profile and liver function test.      4. Chronic pain of left knee    - XR KNEE, COMPLETE (4 OR MORE VIEWS), LEFT (CPT=73564); Future       Type 2 diabetes mellitus  HbA1c increased to 7.8, indicating suboptimal control. Goal is HbA1c under 7.0. Current regimen includes metformin and glipizide. Home glucose readings slightly elevated.  - Continue metformin 500 mg oral twice daily.  - Continue glipizide 5 mg oral twice daily before meals.  - Monitor blood glucose levels at home.  - Re-evaluate HbA1c in 4 months    Essential hypertension  Blood pressure well-controlled on  lisinopril.  - Continue lisinopril 10 mg oral daily.    Osteoarthritis of the knee  Knee pain. X-ray planned for further assessment.  - Order x-ray of the knee.  - Recommend over-the-counter topical gel for knee pain.  - Consider referral to orthopedic specialist if symptoms do not improve.     Plan: As above.  I will follow-up on the x-ray results.  He will contact me if there is any worsening symptoms.  If everything is good, I will see him back as scheduled      The patient indicates understanding of these issues and agrees to the plan.  No follow-ups on file.    This note was prepared using Dragon [employer] voice recognition dictation software. As a result errors may occur. When identified these errors have been corrected. While every attempt is made to correct errors during dictation discrepancies may still exist.       [1]   Current Outpatient Medications   Medication Sig Dispense Refill    cyclobenzaprine 10 MG Oral Tab Take 1 tablet (10 mg total) by mouth every 12 (twelve) hours as needed for Muscle spasms. 180 tablet 1    ACCU-CHEK GUIDE TEST In Vitro Strip USE TO TEST BLOOD SUGAR READINGS ONCE A DAY. DX CODE:E11.3293 100 strip 1    metFORMIN 500 MG Oral Tab Take 2 tablets (1,000 mg total) by mouth 2 (two) times daily. 360 tablet 2    allopurinol 100 MG Oral Tab Take 2 tablets (200 mg total) by mouth daily. 180 tablet 3    lisinopril 10 MG Oral Tab Take 1 tablet (10 mg total) by mouth daily. 90 tablet 3    glipiZIDE 5 MG Oral Tab Take 1 tablet (5 mg total) by mouth 2 (two) times daily before meals. 180 tablet 3    simvastatin 20 MG Oral Tab Take 1 tablet (20 mg total) by mouth nightly. 90 tablet 3    levothyroxine 75 MCG Oral Tab Take 1 tablet (75 mcg total) by mouth before breakfast. 90 tablet 3    Accu-Chek Softclix Lancets Does not apply Misc Test 3x daily 300 each 1    Blood Glucose Monitoring Suppl (ACCU-CHEK GUIDE) w/Device Does not apply Kit Test 3x daily 1 kit 0   [2]   Past Medical History:    Acquired hypothyroidism    Acute right-sided low back pain with right-sided sciatica    Acute URI    Diabetes (HCC)    Greater trochanteric bursitis of both hips    Lumbar radiculopathy, acute    Lumbar trigger point syndrome    Obesity, unspecified    Osteoarthrosis, unspecified whether generalized or localized, unspecified site    Physical exam, annual    Right shoulder pain    Special screening for malignant neoplasm of prostate    Strain of gluteus medius    Subclinical hyperthyroidism    Subclinical hyperthyroidism    Tubular adenoma of colon    per ng polypectomy    Unspecified essential hypertension   [3]   Past Surgical History:  Procedure Laterality Date    Electrocardiogram, complete  10/29/13    scanned into media tab    Other surgical history Right    [4]   Social History  Socioeconomic History    Marital status:    Tobacco Use    Smoking status: Former     Current packs/day: 0.00     Average packs/day: 0.3 packs/day for 50.0 years (12.5 ttl pk-yrs)     Types: Cigarettes     Start date: 1969     Quit date: 2019     Years since quittin.6    Smokeless tobacco: Never   Vaping Use    Vaping status: Never Used   Substance and Sexual Activity    Alcohol use: Yes     Alcohol/week: 2.5 standard drinks of alcohol     Types: 3 Glasses of wine per week     Comment: socially    Drug use: No   Other Topics Concern    Caffeine Concern Yes     Comment: 1cup coffee    Exercise No   Social History Narrative    The patient does not use an assistive device..      The patient does live in a home with stairs.     Social Drivers of Health     Food Insecurity: No Food Insecurity (2025)    NCSS - Food Insecurity     Worried About Running Out of Food in the Last Year: No     Ran Out of Food in the Last Year: No   Transportation Needs: No Transportation Needs (2025)    NCSS - Transportation     Lack of Transportation: No   Housing Stability: Not At Risk (2025)    NCSS - Housing/Utilities      Has Housing: Yes     Worried About Losing Housing: No     Unable to Get Utilities: No   [5]   Family History  Problem Relation Age of Onset    Heart Disorder Father     Heart Disorder Mother     Diabetes Brother     Lipids Other     Heart Disease Other    [6] No Known Allergies

## 2025-07-15 ENCOUNTER — HOSPITAL ENCOUNTER (OUTPATIENT)
Dept: GENERAL RADIOLOGY | Age: 89
Discharge: HOME | End: 2025-07-15
Attending: INTERNAL MEDICINE
Payer: MEDICARE

## 2025-07-15 DIAGNOSIS — M25.562 CHRONIC PAIN OF LEFT KNEE: ICD-10-CM

## 2025-07-15 DIAGNOSIS — G89.29 CHRONIC PAIN OF LEFT KNEE: ICD-10-CM

## 2025-07-15 PROCEDURE — 73564 X-RAY EXAM KNEE 4 OR MORE: CPT | Performed by: INTERNAL MEDICINE

## 2025-07-17 ENCOUNTER — TELEPHONE (OUTPATIENT)
Dept: INTERNAL MEDICINE CLINIC | Facility: CLINIC | Age: 89
End: 2025-07-17

## 2025-07-17 ENCOUNTER — RESULTS FOLLOW-UP (OUTPATIENT)
Dept: INTERNAL MEDICINE CLINIC | Facility: CLINIC | Age: 89
End: 2025-07-17

## 2025-07-17 DIAGNOSIS — M25.562 CHRONIC PAIN OF LEFT KNEE: Primary | ICD-10-CM

## 2025-07-17 DIAGNOSIS — G89.29 CHRONIC PAIN OF LEFT KNEE: Primary | ICD-10-CM

## 2025-07-17 NOTE — TELEPHONE ENCOUNTER
Daughter Myesha, unable to verify [facility]IGN[facility], document will not open.  Inquires about x ray results as patient continues to have significant pain in his knee.  X ray contacted for ASAP read.  Please call patient back with results when available.

## 2025-07-28 ENCOUNTER — OFFICE VISIT (OUTPATIENT)
Dept: ORTHOPEDICS CLINIC | Facility: CLINIC | Age: 89
End: 2025-07-28
Payer: MEDICARE

## 2025-07-28 ENCOUNTER — PATIENT MESSAGE (OUTPATIENT)
Dept: ORTHOPEDICS CLINIC | Facility: CLINIC | Age: 89
End: 2025-07-28

## 2025-07-28 VITALS
HEART RATE: 95 BPM | BODY MASS INDEX: 28.14 KG/M2 | WEIGHT: 190 LBS | HEIGHT: 69 IN | SYSTOLIC BLOOD PRESSURE: 111 MMHG | DIASTOLIC BLOOD PRESSURE: 61 MMHG

## 2025-07-28 DIAGNOSIS — M17.12 PRIMARY OSTEOARTHRITIS OF LEFT KNEE: Primary | ICD-10-CM

## 2025-07-28 RX ORDER — METHYLPREDNISOLONE ACETATE 40 MG/ML
40 INJECTION, SUSPENSION INTRA-ARTICULAR; INTRALESIONAL; INTRAMUSCULAR; SOFT TISSUE ONCE
Status: COMPLETED | OUTPATIENT
Start: 2025-07-28 | End: 2025-07-28

## 2025-07-28 RX ORDER — KETOROLAC TROMETHAMINE 30 MG/ML
30 INJECTION, SOLUTION INTRAMUSCULAR; INTRAVENOUS ONCE
Status: COMPLETED | OUTPATIENT
Start: 2025-07-28 | End: 2025-07-28

## 2025-07-28 RX ADMIN — METHYLPREDNISOLONE ACETATE 40 MG: 40 INJECTION, SUSPENSION INTRA-ARTICULAR; INTRALESIONAL; INTRAMUSCULAR; SOFT TISSUE at 15:52:00

## 2025-07-28 RX ADMIN — KETOROLAC TROMETHAMINE 30 MG: 30 INJECTION, SOLUTION INTRAMUSCULAR; INTRAVENOUS at 15:51:00

## 2025-07-28 NOTE — PROGRESS NOTES
Per Dr Kelly request was draw 1 syringe with 2 ml Marcaine0.5%, 2 ml Lidocaine 2%, 1  ml Depo Medrol 40 mg/ml and 1 ml Ketorolac 60 mg/2 ml for this patient left knee.Jai Flynn

## 2025-07-28 NOTE — PROGRESS NOTES
[facility] - ORTHOPEDICS  [address] [facility] [address]  [phone]     NURSING INTAKE COMMENTS:   Chief Complaint   Patient presents with    Consult     Left knee pain for a couple weeks, denies injury, XRAY in EPIC, 6/10 pain scale            The following individual(s) verbally consented to be recorded using ambient AI listening technology and understand that they can each withdraw their consent to this listening technology at any point by asking the clinician to turn off or pause the recording:    Patient name: Morteza Tubbs        HPI:   History of Present Illness  Morteza Tubbs is an 89 year old male who presents with left knee pain. He is accompanied by his daughter.    He has been experiencing new onset pain in his left knee, primarily on the lateral aspect, which affects his ability to walk and causes limping, especially when getting in and out of a car. The pain is exacerbated by activities such as walking and climbing stairs, necessitating taking stairs one at a time. He also experiences stiffness, occasional locking, and swelling in the left knee, with a noted loss of cartilage on the lateral side. This is his first time seeking treatment for this issue.    He remains active, engaging in activities such as biking and walking regularly.        Past Medical History[1]  Past Surgical History[2]  Current Medications[3]  Allergies[4]  Family History[5]    Social History     Occupational History    Not on file   Tobacco Use    Smoking status: Former     Current packs/day: 0.00     Average packs/day: 0.3 packs/day for 50.0 years (12.5 ttl pk-yrs)     Types: Cigarettes     Start date: 1969     Quit date: 2019     Years since quittin.6    Smokeless tobacco: Never   Vaping Use    Vaping status: Never Used   Substance and Sexual Activity    Alcohol use: Yes     Alcohol/week: 2.5 standard drinks of alcohol     Types: 3 Glasses of wine per week     Comment: socially    Drug use: No     Sexual activity: Not on file        Review of Systems:  GENERAL: denies fevers, chills, night sweats, fatigue, unintentional weight loss/gain  SKIN: denies skin lesions, open sores, rash  HEENT:denies recent vision change, new nasal congestion,hearing loss, tinnitus, sore throat, headaches  RESPIRATORY: denies new shortness of breath, cough, asthma, wheezing  CARDIOVASCULAR: denies chest pain, leg cramps with exertion, palpitations, leg swelling  GI: denies abdominal pain, nausea, vomiting, diarrhea, constipation, hematochezia, worsening heartburn or stomach ulcers  : denies dysuria, hematuria, incontinence, increased frequency, urgency, difficulty urinating  MUSCULOSKELETAL: denies musculoskeletal complaints other than in HPI  NEURO: denies numbness, tingling, weakness, balance issues, dizziness, memory loss  PSYCHIATRIC: denies Hx of depression, anxiety, other psychiatric disorders  HEMATOLOGIC: denies blood clots, anemia, blood clotting disorders, blood transfusion  ENDOCRINE: denies autoimmune disease, thyroid issues, or diabetes  ALLERGY: denies asthma, seasonal allergies    Physical Examination:    /61 (BP Location: Right arm, Patient Position: Sitting, Cuff Size: adult)   Pulse 95   Ht 5' 9\" (1.753 m)   Wt 190 lb (86.2 kg)   BMI 28.06 kg/m²     Physical Exam  MUSCULOSKELETAL: Right knee normal. Left knee swelling present.    Constitutional: appears well hydrated, alert and responsive, no acute distress noted    Left knee exam    Ambulates with antalgic gait.  Tender to palpation lateral joint line mild tenderness to palpation over the medial joint line range of motion roughly 5 to 110 degrees with pain at terminal forced flexion and terminal extension.  Stable ligamentous exam neuro intact distally      Imaging:     Results  RADIOLOGY  Knee X-ray: Lateral compartment cartilage loss, small osteophyte, preserved medial joint space, patellar region pain.      Imaging was independently reviewed and  interpreted by attending physician  XR KNEE, COMPLETE (4 OR MORE VIEWS), LEFT (CPT=73564)  Result Date: 7/17/2025  PROCEDURE: XR KNEE, COMPLETE (4 OR MORE VIEWS), LEFT (CPT=73564) INDICATION: DX-G89.29 Chronic pain of left knee;DX-M25.562 Chronic pain of left knee; COMPARISON: TECHNIQUE: 5 Views were obtained. FINDINGS: BONES: No acute left knee fracture. Mild to moderate tricompartmental osteoarthritis noted. There is quadriceps insertion enthesopathy. Small corticated ossific fragments at the patellar tendon insertion probably relate to sequelae of chronic/remote trauma. Incidental fabella. SOFT TISSUES: No visible soft tissue swelling. EFFUSION: Small suprapatellar joint effusion. Infrapatellar soft tissue swelling. OTHER: Atherosclerosis.     CONCLUSION: Mild to moderate tricompartmental osteoarthritis of the left knee with a small suprapatellar joint effusion. Nonspecific infrapatellar soft tissue swelling. Electronically Verified and Signed by Attending Radiologist: Conrad Chen MD 7/17/2025 9:23 AM Workstation: [id number]       Labs:  Lab Results   Component Value Date    WBC 5.6 04/01/2025    HGB 11.9 (L) 04/01/2025    .0 04/01/2025      Lab Results   Component Value Date     (H) 04/01/2025    BUN 19 04/01/2025    CREATSERUM 1.22 04/01/2025    GFRNAA 43 (L) 03/28/2022    GFRAA 50 (L) 03/28/2022        Assessment and Plan:  Assessment & Plan  Knee osteoarthritis with effusion  Chronic left knee osteoarthritis with lateral cartilage loss, mild swelling, and pain. X-rays show cartilage loss and small bone spur. No significant effusion for arthrocentesis. Not severe enough for surgery. Cortisone injection expected to improve symptoms by 60-70%.  - Administer cortisone injection to left knee.  - Initiate physical therapy for knee function and strength.  - Follow-up in 8 weeks to assess improvement.  - Consider cortisone injections every 6 months if symptoms persist.      Diagnoses and all orders  for this visit:    Primary osteoarthritis of left knee      Procedure Note Corticosteroid Injection  _______________________________________________________________________________________________________________  Patient Name: Morteza Tubbs   Date: 7/28/2025     Based on history, physical exam, and available diagnostic testing, the patient diagnosis appears consistent with intraarticular pathology. We discussed the use of a corticosteroid injection for therapeutic/confirmatory diagnostic purposes. The risks, benefits, and potential complications of corticosteroid injection(s) were discussed in detail. The risks include, but are not limited to: pain, infection, bleeding/hematoma, swelling, flare response, incomplete resolution of symptoms, possible need for further injections or subsequent surgical intervention, subcutaneous fat atrophy, skin pigmentation changes, and elevation of blood sugar. The patient verbalized an understanding and agrees to the injection(s).     Under sterile prep, approximately:  1 mL DepoMedrol 40mg/ml, 2 mL marcaine plain, 2 mL lidocaine plain, 1mL Toradol 30mg/mL    was injected into: Left knee joint     This procedure was performed without ultrasound guidance    This procedure was well tolerated. The patient understands that the injection could take several days to take effect.    The patient was not sedated and fully conscious for the injection.  Prior to the injection, verification followed the Universal Protocol in the following manner:  [X] A \"Time Out\" was performed prior to the procedure to confirm patient identification, injection site, and preparation of equipment.  [X] The patient was identified using two patient identifiers.  [X] The procedure site was appropriately prepped    Clint Kelly MD  Orthopaedic Surgery  7/28/2025        Follow Up: No follow-ups on file.          Clint Kelly MD Orthopedic Surgery / Sports Medicine Specialist  [facility] [place] [place] [facility]  [facility]  [address] [place] [address]  [email or web address]    t: [phone] f: [phone]    This note was dictated using Dragon software.  While it was briefly proofread prior to completion, some grammatical, spelling, and word choice errors due to dictation may still occur.       [1]   Past Medical History:   Acquired hypothyroidism    Acute right-sided low back pain with right-sided sciatica    Acute URI    Diabetes (HCC)    Greater trochanteric bursitis of both hips    Lumbar radiculopathy, acute    Lumbar trigger point syndrome    Obesity, unspecified    Osteoarthrosis, unspecified whether generalized or localized, unspecified site    Physical exam, annual    Right shoulder pain    Special screening for malignant neoplasm of prostate    Strain of gluteus medius    Subclinical hyperthyroidism    Subclinical hyperthyroidism    Tubular adenoma of colon    per ng polypectomy    Unspecified essential hypertension   [2]   Past Surgical History:  Procedure Laterality Date    Electrocardiogram, complete  10/29/13    scanned into media tab    Other surgical history Right    [3]   Current Outpatient Medications   Medication Sig Dispense Refill    cyclobenzaprine 10 MG Oral Tab Take 1 tablet (10 mg total) by mouth every 12 (twelve) hours as needed for Muscle spasms. 180 tablet 1    ACCU-CHEK GUIDE TEST In Vitro Strip USE TO TEST BLOOD SUGAR READINGS ONCE A DAY. DX CODE:E11.3293 100 strip 1    metFORMIN 500 MG Oral Tab Take 2 tablets (1,000 mg total) by mouth 2 (two) times daily. 360 tablet 2    allopurinol 100 MG Oral Tab Take 2 tablets (200 mg total) by mouth daily. 180 tablet 3    lisinopril 10 MG Oral Tab Take 1 tablet (10 mg total) by mouth daily. 90 tablet 3    glipiZIDE 5 MG Oral Tab Take 1 tablet (5 mg total) by mouth 2 (two) times daily before meals. 180 tablet 3    simvastatin 20 MG Oral Tab Take 1 tablet (20 mg total) by mouth nightly. 90 tablet 3    levothyroxine 75 MCG Oral Tab Take 1 tablet (75 mcg  total) by mouth before breakfast. 90 tablet 3    Accu-Chek Softclix Lancets Does not apply Misc Test 3x daily 300 each 1    Blood Glucose Monitoring Suppl (ACCU-CHEK GUIDE) w/Device Does not apply Kit Test 3x daily 1 kit 0   [4] No Known Allergies  [5]   Family History  Problem Relation Age of Onset    Heart Disorder Father     Heart Disorder Mother     Diabetes Brother     Lipids Other     Heart Disease Other

## 2025-07-30 ENCOUNTER — ORDER TRANSCRIPTION (OUTPATIENT)
Dept: PHYSICAL THERAPY | Facility: HOSPITAL | Age: 89
End: 2025-07-30

## 2025-07-30 DIAGNOSIS — M17.12 PRIMARY OSTEOARTHRITIS OF LEFT KNEE: Primary | ICD-10-CM

## 2025-08-19 ENCOUNTER — OFFICE VISIT (OUTPATIENT)
Dept: PHYSICAL THERAPY | Age: 89
End: 2025-08-19
Attending: STUDENT IN AN ORGANIZED HEALTH CARE EDUCATION/TRAINING PROGRAM

## 2025-08-19 DIAGNOSIS — M17.12 PRIMARY OSTEOARTHRITIS OF LEFT KNEE: Primary | ICD-10-CM

## 2025-08-19 PROCEDURE — 97161 PT EVAL LOW COMPLEX 20 MIN: CPT | Performed by: PHYSICAL THERAPIST

## 2025-08-19 PROCEDURE — 97110 THERAPEUTIC EXERCISES: CPT | Performed by: PHYSICAL THERAPIST

## 2025-08-21 ENCOUNTER — OFFICE VISIT (OUTPATIENT)
Dept: PHYSICAL THERAPY | Age: 89
End: 2025-08-21
Attending: STUDENT IN AN ORGANIZED HEALTH CARE EDUCATION/TRAINING PROGRAM

## 2025-08-21 PROCEDURE — 97110 THERAPEUTIC EXERCISES: CPT | Performed by: PHYSICAL THERAPIST

## 2025-08-27 ENCOUNTER — TELEPHONE (OUTPATIENT)
Dept: PHYSICAL THERAPY | Age: 89
End: 2025-08-27

## 2025-08-27 ENCOUNTER — APPOINTMENT (OUTPATIENT)
Dept: PHYSICAL THERAPY | Age: 89
End: 2025-08-27
Attending: STUDENT IN AN ORGANIZED HEALTH CARE EDUCATION/TRAINING PROGRAM

## 2025-08-29 ENCOUNTER — APPOINTMENT (OUTPATIENT)
Dept: PHYSICAL THERAPY | Age: 89
End: 2025-08-29
Attending: STUDENT IN AN ORGANIZED HEALTH CARE EDUCATION/TRAINING PROGRAM

## (undated) NOTE — LETTER
AUTHORIZATION FOR SURGICAL OPERATION OR OTHER PROCEDURE    1. I hereby authorize Clint Hargrove  and [facility] staff assigned to my case to perform the following operation and/or procedure at the [facility] site:    Cortisone injection in Left knee  _______________________________________________________________________________________________      _______________________________________________________________________________________________    2.  My physician has explained the nature and purpose of the operation or other procedure, possible alternative methods of treatment, the risks involved, and the possibility of complication to me.  I acknowledge that no guarantee has been made as to the result that may be obtained.  3.  I recognize that, during the course of this operation, or other procedure, unforseen conditions may necessitate additional or different procedure than those listed above.  I, therefore, further authorize and request that the above named physician, his/her physician assistants or designees perform such procedures as are, in his/her professional opinion, necessary and desirable.  4.  Any tissue or organs removed in the operation or other procedure may be disposed of by and at the discretion of the [facility] and [facility].  5.  I understand that in the event of a medical emergency, I will be transported by local paramedics to [facility] or other hospital emergency department.  6.  I certify that I have read and fully understand the above consent to operation and/or other procedure.    7.  I acknowledge that my physician has explained sedation/analgesia administration to me including the risks and benefits.  I consent to the administration of sedation/analgesia as may be necessary or desirable in the judgement of my physician.    Witness signature: ___________________________________________________ Date:   ______/______/_____                    Time:  ________ A.M.  P.M.       Patient Name:  ______________________________________________________  (please print)      Patient signature:  ___________________________________________________             Relationship to Patient:           []  Parent    Responsible person                          []  Spouse  In case of minor or                    [] Other  _____________   Incompetent name:  __________________________________________________                               (please print)      _____________      Responsible person  In case of minor or  Incompetent signature:  _______________________________________________    Statement of Physician  My signature below affirms that prior to the time of the procedure, I have explained to the patient and/or his/her guardian, the risks and benefits involved in the proposed treatment and any reasonable alternative to the proposed treatment.  I have also explained the risks and benefits involved in the refusal of the proposed treatment and have answered the patient's questions.                        Date:  ______/______/_______  Provider                      Signature:  __________________________________________________________       Time:  ___________ A.M    P.M.

## (undated) NOTE — LETTER
AUTHORIZATION FOR SURGICAL OPERATION OR OTHER PROCEDURE    1.  I hereby authorize Dr. Yeyo Myers and the [facility] staff assigned to my case to perform the following operation and/or procedure at the [facility] Office:    RIGHT greater trochanter CSI under ultras Relationship to Patient:           []  Parent    Responsible person                          []  Spouse  In case of minor or                    [] Other  _____________   Incompetent name:  __________________________________________________

## (undated) NOTE — MR AVS SNAPSHOT
[address] [address]  [phone]               Thank you for choosing us for your health care visit with Justin Jeronimo MD.  We are glad to serve you and happy to provide you with this s Take 1 tablet (20 mg total) by mouth nightly.    Commonly known as:  ZOCOR                   Today's Orders     CBC W Differential W Platelet [E]    Complete by:  Jan 16, 2017 (Approximate)    Assoc Dx:  Essential hypertension with goal blood pressure less

## (undated) NOTE — LETTER
09/19/18        Sudhir Cohn 8188      Dear Felecia Staley records indicate that you have outstanding lab work and or testing that was ordered for you and has not yet been completed:  Orders Placed This Encounter      He

## (undated) NOTE — MR AVS SNAPSHOT
[address] [address]  [phone]               Thank you for choosing us for your health care visit with Matias Ramirez MD.  We are glad to serve you and happy to provide you with this s Take 1 tablet (20 mg total) by mouth nightly.    Commonly known as:  Jeff Ortega     Call the "[employer] for assistance with your inactive dot429 account    If you have questions, you can call [phone] to talk to our [place]

## (undated) NOTE — LETTER
[facility] SURGERY SCHEDULING FORM   [address] [place] [place] [address] [place] [address]   [phone] (scheduling phone) [phone] (scheduling fax)     PATIENT INFORMATION   Last Name:      Deacon Davis      First Name:    Cherylene Halon []  General   []  Choice  []  [place]  []  Regional/              []  Spinal  []  No Anesthesia   [x]  Yes  []  No or using our own   Allergies: Patient has no known allergies.          Completed by:    Marquita Murguia      Date:    12/6/2019

## (undated) NOTE — LETTER
[facility] SURGERY SCHEDULING FORM   [address]  [address] [place] [place] [address] 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

## (undated) NOTE — LETTER
9/2/2021              Ragini Haas        [id number]        [place] [address]         Dear Eloise Holloway,    This letter is to inform you that our office has made several attempts to reach you by phone without success.   We were attempting to contact you

## (undated) NOTE — LETTER
02/20/18        Sudhir Cohn 9888      Dear Felecia Staley records indicate that you have outstanding lab work and or testing that was ordered for you and has not yet been completed:          Hemoglobin A1C [E]      Micr

## (undated) NOTE — LETTER
12/5/2022              Sridevi Camacho        [id number]        [place] [address]         Dear Rebel Bateman are scheduled to see Dr. Alfa Richardson on January 31, 2023 at 1:30 PM.  This is just a notification that our office has moved to the [address] [place] [address] attached to the [facility].  The new address is:     [address] [place] [address], [address]    The closest parking lot is orange    For your convenience, attached is the hospital map. If you have any questions or concerns, please contact the office at [phone].     Sincerely,    [facility] ENDOCR[facility]

## (undated) NOTE — LETTER
10/6/2021              Ragini Haas        [id number]        [place] [address]         Dear Eloise Holloway,    This letter is to inform you that our office has made several attempts to reach you by phone without success.   We were attempting to contact yo